# Patient Record
Sex: MALE | Race: WHITE | Employment: OTHER | URBAN - METROPOLITAN AREA
[De-identification: names, ages, dates, MRNs, and addresses within clinical notes are randomized per-mention and may not be internally consistent; named-entity substitution may affect disease eponyms.]

---

## 2018-12-26 ENCOUNTER — HOSPITAL ENCOUNTER (INPATIENT)
Age: 73
LOS: 9 days | Discharge: HOME HEALTH | DRG: 253 | End: 2019-01-04
Attending: EMERGENCY MEDICINE | Admitting: SURGERY
Payer: MEDICARE

## 2018-12-26 DIAGNOSIS — Z95.9 STATUS POST ARTERIAL STENT: Primary | ICD-10-CM

## 2018-12-26 DIAGNOSIS — I73.9 PVD (PERIPHERAL VASCULAR DISEASE) (HCC): ICD-10-CM

## 2018-12-26 PROBLEM — I10 HYPERTENSION: Status: ACTIVE | Noted: 2018-12-26

## 2018-12-26 PROBLEM — G47.00 INSOMNIA: Status: ACTIVE | Noted: 2018-12-26

## 2018-12-26 PROBLEM — E78.00 HIGH CHOLESTEROL: Status: ACTIVE | Noted: 2018-12-26

## 2018-12-26 PROBLEM — J44.9 COPD (CHRONIC OBSTRUCTIVE PULMONARY DISEASE) (HCC): Status: ACTIVE | Noted: 2018-12-26

## 2018-12-26 LAB
ALBUMIN SERPL-MCNC: 3.6 G/DL (ref 3.4–5)
ALBUMIN/GLOB SERPL: 0.9 {RATIO} (ref 0.8–1.7)
ALP SERPL-CCNC: 89 U/L (ref 45–117)
ALT SERPL-CCNC: 23 U/L (ref 16–61)
ANION GAP SERPL CALC-SCNC: 6 MMOL/L (ref 3–18)
APTT PPP: 28.2 SEC (ref 23–36.4)
AST SERPL-CCNC: 31 U/L (ref 15–37)
BASOPHILS # BLD: 0 K/UL (ref 0–0.1)
BASOPHILS NFR BLD: 1 % (ref 0–2)
BILIRUB SERPL-MCNC: 1 MG/DL (ref 0.2–1)
BUN SERPL-MCNC: 18 MG/DL (ref 7–18)
BUN/CREAT SERPL: 23 (ref 12–20)
CALCIUM SERPL-MCNC: 9.2 MG/DL (ref 8.5–10.1)
CHLORIDE SERPL-SCNC: 101 MMOL/L (ref 100–108)
CO2 SERPL-SCNC: 32 MMOL/L (ref 21–32)
CREAT SERPL-MCNC: 0.8 MG/DL (ref 0.6–1.3)
DIFFERENTIAL METHOD BLD: NORMAL
EOSINOPHIL # BLD: 0.2 K/UL (ref 0–0.4)
EOSINOPHIL NFR BLD: 2 % (ref 0–5)
ERYTHROCYTE [DISTWIDTH] IN BLOOD BY AUTOMATED COUNT: 14.5 % (ref 11.6–14.5)
GLOBULIN SER CALC-MCNC: 4.1 G/DL (ref 2–4)
GLUCOSE SERPL-MCNC: 89 MG/DL (ref 74–99)
HCT VFR BLD AUTO: 45.4 % (ref 36–48)
HGB BLD-MCNC: 14.5 G/DL (ref 13–16)
INR PPP: 1 (ref 0.8–1.2)
LYMPHOCYTES # BLD: 1.5 K/UL (ref 0.9–3.6)
LYMPHOCYTES NFR BLD: 21 % (ref 21–52)
MCH RBC QN AUTO: 30.3 PG (ref 24–34)
MCHC RBC AUTO-ENTMCNC: 31.9 G/DL (ref 31–37)
MCV RBC AUTO: 94.8 FL (ref 74–97)
MONOCYTES # BLD: 0.7 K/UL (ref 0.05–1.2)
MONOCYTES NFR BLD: 10 % (ref 3–10)
NEUTS SEG # BLD: 4.6 K/UL (ref 1.8–8)
NEUTS SEG NFR BLD: 66 % (ref 40–73)
PLATELET # BLD AUTO: 279 K/UL (ref 135–420)
PMV BLD AUTO: 11 FL (ref 9.2–11.8)
POTASSIUM SERPL-SCNC: 4.4 MMOL/L (ref 3.5–5.5)
PROT SERPL-MCNC: 7.7 G/DL (ref 6.4–8.2)
PROTHROMBIN TIME: 13.2 SEC (ref 11.5–15.2)
RBC # BLD AUTO: 4.79 M/UL (ref 4.7–5.5)
SODIUM SERPL-SCNC: 139 MMOL/L (ref 136–145)
WBC # BLD AUTO: 7 K/UL (ref 4.6–13.2)

## 2018-12-26 PROCEDURE — 94664 DEMO&/EVAL PT USE INHALER: CPT

## 2018-12-26 PROCEDURE — 74011250636 HC RX REV CODE- 250/636: Performed by: EMERGENCY MEDICINE

## 2018-12-26 PROCEDURE — 85730 THROMBOPLASTIN TIME PARTIAL: CPT

## 2018-12-26 PROCEDURE — 85025 COMPLETE CBC W/AUTO DIFF WBC: CPT

## 2018-12-26 PROCEDURE — 94640 AIRWAY INHALATION TREATMENT: CPT

## 2018-12-26 PROCEDURE — 85610 PROTHROMBIN TIME: CPT

## 2018-12-26 PROCEDURE — 74011000250 HC RX REV CODE- 250: Performed by: HOSPITALIST

## 2018-12-26 PROCEDURE — 80053 COMPREHEN METABOLIC PANEL: CPT

## 2018-12-26 PROCEDURE — 74011250637 HC RX REV CODE- 250/637: Performed by: HOSPITALIST

## 2018-12-26 PROCEDURE — 99283 EMERGENCY DEPT VISIT LOW MDM: CPT

## 2018-12-26 PROCEDURE — 65270000029 HC RM PRIVATE

## 2018-12-26 RX ORDER — ESCITALOPRAM OXALATE 5 MG/1
5 TABLET ORAL DAILY
COMMUNITY
End: 2019-01-04

## 2018-12-26 RX ORDER — DIAZEPAM 5 MG/1
5 TABLET ORAL
COMMUNITY

## 2018-12-26 RX ORDER — HEPARIN SODIUM 10000 [USP'U]/100ML
18-36 INJECTION, SOLUTION INTRAVENOUS
Status: DISCONTINUED | OUTPATIENT
Start: 2018-12-26 | End: 2018-12-26

## 2018-12-26 RX ORDER — ARFORMOTEROL TARTRATE 15 UG/2ML
15 SOLUTION RESPIRATORY (INHALATION)
Status: DISCONTINUED | OUTPATIENT
Start: 2018-12-26 | End: 2019-01-04 | Stop reason: HOSPADM

## 2018-12-26 RX ORDER — DIAZEPAM 5 MG/1
5 TABLET ORAL
Status: DISCONTINUED | OUTPATIENT
Start: 2018-12-26 | End: 2019-01-04 | Stop reason: HOSPADM

## 2018-12-26 RX ORDER — BUDESONIDE AND FORMOTEROL FUMARATE DIHYDRATE 160; 4.5 UG/1; UG/1
2 AEROSOL RESPIRATORY (INHALATION) 2 TIMES DAILY
COMMUNITY

## 2018-12-26 RX ORDER — LOSARTAN POTASSIUM 50 MG/1
100 TABLET ORAL DAILY
Status: DISCONTINUED | OUTPATIENT
Start: 2018-12-26 | End: 2019-01-01

## 2018-12-26 RX ORDER — ROSUVASTATIN CALCIUM 10 MG/1
40 TABLET, COATED ORAL
Status: DISCONTINUED | OUTPATIENT
Start: 2018-12-26 | End: 2019-01-04 | Stop reason: HOSPADM

## 2018-12-26 RX ORDER — ESCITALOPRAM OXALATE 10 MG/1
5 TABLET ORAL DAILY
Status: DISCONTINUED | OUTPATIENT
Start: 2018-12-27 | End: 2019-01-01

## 2018-12-26 RX ORDER — BUDESONIDE AND FORMOTEROL FUMARATE DIHYDRATE 160; 4.5 UG/1; UG/1
2 AEROSOL RESPIRATORY (INHALATION) 2 TIMES DAILY
Status: DISCONTINUED | OUTPATIENT
Start: 2018-12-26 | End: 2018-12-26

## 2018-12-26 RX ORDER — BUDESONIDE 0.5 MG/2ML
500 INHALANT ORAL
Status: DISCONTINUED | OUTPATIENT
Start: 2018-12-26 | End: 2019-01-04 | Stop reason: HOSPADM

## 2018-12-26 RX ORDER — HEPARIN SODIUM 1000 [USP'U]/ML
80 INJECTION, SOLUTION INTRAVENOUS; SUBCUTANEOUS ONCE
Status: COMPLETED | OUTPATIENT
Start: 2018-12-26 | End: 2018-12-26

## 2018-12-26 RX ORDER — HYDROCHLOROTHIAZIDE 25 MG/1
25 TABLET ORAL DAILY
Status: DISCONTINUED | OUTPATIENT
Start: 2018-12-26 | End: 2018-12-30

## 2018-12-26 RX ORDER — TEMAZEPAM 7.5 MG/1
30 CAPSULE ORAL
Status: DISCONTINUED | OUTPATIENT
Start: 2018-12-26 | End: 2018-12-30

## 2018-12-26 RX ADMIN — HYDROCHLOROTHIAZIDE 25 MG: 25 TABLET ORAL at 21:42

## 2018-12-26 RX ADMIN — LOSARTAN POTASSIUM 100 MG: 50 TABLET ORAL at 21:43

## 2018-12-26 RX ADMIN — ARFORMOTEROL TARTRATE 15 MCG: 15 SOLUTION RESPIRATORY (INHALATION) at 20:59

## 2018-12-26 RX ADMIN — TEMAZEPAM 30 MG: 15 CAPSULE ORAL at 22:47

## 2018-12-26 RX ADMIN — ROSUVASTATIN CALCIUM 40 MG: 20 TABLET, FILM COATED ORAL at 22:46

## 2018-12-26 RX ADMIN — HEPARIN SODIUM 4900 UNITS: 1000 INJECTION INTRAVENOUS; SUBCUTANEOUS at 15:54

## 2018-12-26 RX ADMIN — BUDESONIDE 500 MCG: 0.5 INHALANT RESPIRATORY (INHALATION) at 20:59

## 2018-12-26 NOTE — Clinical Note
Sheath #2: Dressed using 4 X 4 and transparent dressing. Site: clean, dry, & intact, no bleeding and no hematoma.

## 2018-12-26 NOTE — ED NOTES
TRANSFER - ED to INPATIENT REPORT:    SBAR report made available to receiving floor on this patient being transferred to [facility] ([id number])  for routine progression of care       Admitting diagnosis Status post arterial stent    Information from the following report(s) SBAR, Kardex, ED Summary, Procedure Summary, Intake/Output, MAR and Recent Results was made available to receiving floor. Lines:       Medication list confirmed with patient    Opportunity for questions and clarification was provided.       Patient is oriented to time, place, person and situation Last [facility] n/a  Patient is  continent and ambulatory without assist     Valuables transported with patient     Patient transported with:   Tech      Vitals w/ MEWS Score (last day)     Date/Time MEWS Score Pulse Resp Temp BP Level of Consciousness SpO2    12/26/18 1600    71  19    182/72    97 %    12/26/18 1545    71  16        97 %    12/26/18 1430    65  18    177/71    97 %    12/26/18 1415    77  20        88 %  (Abnormal)     12/26/18 1400    70  18    142/121  (Abnormal)     95 %    12/26/18 1345    71  17    170/77    95 %    12/26/18 1340  1  71  17  98.1 °F (36.7 °C)  158/79  Alert  96 %

## 2018-12-26 NOTE — CONSULTS
Internal Medicine Consult          Consult Requested By: Dr. Lisa Echeverria, vascular surgery    Subjective     HPI: Joseluis Naik is a 74 y.o. male with a PMHx of HTN, HLD, PAD who we were consulted for medical management during admission for PAD. The patient had right popliteal artery occlusion and SFA stenosis with atherectomy, right pop a. Stent placement and right SFA angioplasty on 12/13. The patient admits to being discharged home. He then states that his RLE became darker in color and developed black toes and loss of sensation plantar aspect. It is unclear if this happened before or after procedure because the patient is a poor historian. Regardless, those particular symptoms improved prior to presentation today. The patient states he was at elective bladder procedure today to evaluate previously treated cyst when he told physician his leg needed to be checked out before the procedure. He was then sent to [facility]  for closer examination due to concern for arterial occlusion. He states he had been on xarelto but had stopped taking for the bladder procedure about a week ago. In the ED, his doppler pulses were heard on RLE. He complains of pain, discoloration of his RLE, but sensation is essentially intact.      PMHx:  Past Medical History:   Diagnosis Date    High cholesterol     Hypertension        PSurgHx:  Past Surgical History:   Procedure Laterality Date    HX APPENDECTOMY      HX MOHS PROCEDURES Left     HX TONSIL AND ADENOIDECTOMY         SocialHx:  Social History     Socioeconomic History    Marital status:      Spouse name: Not on file    Number of children: Not on file    Years of education: Not on file    Highest education level: Not on file   Social Needs    Financial resource strain: Not on file    Food insecurity - worry: Not on file    Food insecurity - inability: Not on file   [place] needs - medical: Not on file   [place] needs - non-medical: Not on file Occupational History    Not on file   Tobacco Use    Smoking status: Current Every Day Smoker     Packs/day: 1.00   Substance and Sexual Activity    Alcohol use: Yes     Comment: \" beer 3/day\"    Drug use: Not on file    Sexual activity: Not on file   Other Topics Concern    Not on file   Social History Narrative    Not on file       FamilyHx:  No family history on file. Prior to Admission Medications   Prescriptions Last Dose Informant Patient Reported? Taking?   aspirin 81 mg chewable tablet   Yes No   Sig: Take 81 mg by mouth daily. losartan-hydrochlorothiazide (HYZAAR) 100-25 mg per tablet   Yes No   Sig: Take 1 Tab by mouth daily. rosuvastatin (CRESTOR) 40 mg tablet   Yes No   Sig: Take 40 mg by mouth nightly. temazepam (RESTORIL) 30 mg capsule   Yes No   Sig: Take 30 mg by mouth nightly as needed for Sleep.       Facility-Administered Medications: None       Review of Systems:  CONST: Fever, weight loss, fatigue or chills  HEENT: Recent changes in vision, vertigo, epistaxis, dysphagia and hoarseness  CV: Chest pain, palpitations, edema and varicosities  RESP: Cough, shortness of breath, wheezing, hemoptysis, snoring and reactive airway disease  GI: Nausea, vomiting, abdominal pain, change in bowel habits, hematochezia, melena, and GERD   : Hematuria, dysuria, frequency, urgency, nocturia and stress urinary incontinence   MS: Weakness, joint pain and arthritis, pain RLE  ENDO: Polyuria, polydipsia, polyphagia, poor wound healing, heat intolerance, cold intolerance  LYMPH/HEME: Anemia, bruising and history of blood transfusions  INTEG: Dermatitis, abnormal moles, discoloration RLE  NEURO: Dizziness, headache, fainting, seizures and stroke  PSYCH: Anxiety and depression        Objective      Visit Vitals  /79 (BP 1 Location: Right arm, BP Patient Position: At rest;Sitting)   Pulse 71   Temp 98.1 °F (36.7 °C)   Resp 17   Ht 5' 8\" (1.727 m)   Wt 61.2 kg (135 lb)   SpO2 96%   BMI 20.53 kg/m² Physical Exam:  General Appearance: NAD, conversant  HENT: normocephalic/atraumatic, moist mucus membranes  Lungs: CTA with normal respiratory effort  CV: RRR, no m/r/g  Abdomen: soft, non-tender, normal bowel sounds  Extremities: RLE is purplish color from thigh distally, sensation intact  Neuro: moves all extremities, no focal deficits  Psych: appropriate affect, alert and oriented to person, place and time    Laboratory Studies:  BMP: No results found for: NA, K, CL, CO2, AGAP, GLU, BUN, CREA, GFRAA, GFRNA  CBC:   Lab Results   Component Value Date/Time    WBC 7.0 12/26/2018 02:05 PM    HGB 14.5 12/26/2018 02:05 PM    HCT 45.4 12/26/2018 02:05 PM     12/26/2018 02:05 PM       Imaging Reviewed:  No results found. Assessment/Plan     Active Hospital Problems    Diagnosis Date Noted    Status post arterial stent 12/26/2018    High cholesterol 12/26/2018    Hypertension 12/26/2018    Peripheral arterial disease ([facility] [address] 12/26/2018    COPD (chronic obstructive pulmonary disease) ([facility] [address] 12/26/2018    Insomnia 12/26/2018     - Patient without acute limb ischemia given ext warm to touch, ability to move, no sensory issues  - F/u vascular surgery eval  - Diet and mobilization per primary team  - Pain control PRN  - PT/OT  - Cont heparin gtt per vasc surg  - Observe off antibx  - Cont acceptable home medications for chronic conditions   - DVT protocol    I reviewed all available labs and imaging that were available prior to my encounter. We appreciate the consultation for medical management and appreciate being able to be involved with their care during hospitalization.       Andrez Coronel, DO  Internal Medicine, Hospitalist  Pager: [phone]

## 2018-12-26 NOTE — H&P
75 yo male with history of severe atherosclerosis and claudication who was seen at [facility] today for bladder biopsy and noted to have discoloration of his right leg. Pain has subsided according to patient report. He is s/p right popliteal artery atherectomy and angioplasty on 12/13. Likely popliteal dissection vs. Thrombosis. Will plan for angiogram 12/27. Heparin gtt for now. NPO after midnight.

## 2018-12-26 NOTE — Clinical Note
TRANSFER - OUT REPORT:  
 
Verbal report given to: Aletha Granado RN. Report consisted of patient's Situation, Background, Assessment and  
Recommendations(SBAR). Opportunity for questions and clarification was provided. Patient transported with a Cardiac Cath Tech / Patient Care Tech. Patient transported to: PACU.

## 2018-12-26 NOTE — Clinical Note
Sheath #2: Closed using manual compression and ExoSeal. Site secured by Tegaderm. Pressure held for: 16 minutes.

## 2018-12-26 NOTE — Clinical Note
Sheath #1: Closed using manual compression. Site secured by Tegaderm. Pressure held for: 10 minutes.

## 2018-12-26 NOTE — ED TRIAGE NOTES
Pt arrived by EMS from [facility] with c/po of left foot cool, discolored and numbness in extremity

## 2018-12-26 NOTE — ED PROVIDER NOTES
EMERGENCY DEPARTMENT HISTORY AND PHYSICAL EXAM    1:19 PM      Date: 12/26/2018  Patient Name: Luis Pitts    History of Presenting Illness     Chief Complaint   Patient presents with    Numbness         History Provided By: Patient       1:19 PM Luis Pitts is a 74 y.o. male with h/o HTN and HLD who presents to ED complaining of moderate, acute right artery occlusion  onset one week with associated discoloration in the RLE and decreased sensation in the right big toe. The patient was brought in by EMS. He was not given Heparin or medication PTA. The patient had a stent placed in the RLE on 12/13/18. Afterwards, the patient's right leg turned black, and he lost feeling in the right big toe. The patient was on Xarelto for one week after the surgery, but he stopped taking it because the patient was due for bladder surgery today. The patient took Motrin daily. The surgeon noticed the discoloration in the leg and sent him to the ED. No other concerns or symptoms at this time. PCP: Ayesha Patel MD        Current Facility-Administered Medications   Medication Dose Route Frequency Provider Last Rate Last Dose    heparin 25,000 units in D5W 250 ml infusion  18-36 Units/kg/hr IntraVENous TITRATE Michoacano Hernandez MD           Past History     Past Medical History:  Past Medical History:   Diagnosis Date    High cholesterol     Hypertension        Past Surgical History:  Past Surgical History:   Procedure Laterality Date    HX APPENDECTOMY      HX MOHS PROCEDURES Left     HX TONSIL AND ADENOIDECTOMY         Family History:  No family history on file. Social History:  Social History     Tobacco Use    Smoking status: Current Every Day Smoker     Packs/day: 1.00   Substance Use Topics    Alcohol use: Yes     Comment: \" beer 3/day\"    Drug use: Not on file       Allergies:   Allergies   Allergen Reactions    Augmentin [Amoxicillin-Pot Clavulanate] Diarrhea         Review of Systems       Review of Systems   Cardiovascular: Positive for leg swelling (right). Skin: Positive for color change (right leg). All other systems reviewed and are negative. Physical Exam     Visit Vitals  /72   Pulse 71   Temp 98.1 °F (36.7 °C)   Resp 19   Ht 5' 8\" (1.727 m)   Wt 61.2 kg (135 lb)   SpO2 97%   BMI 20.53 kg/m²         Physical Exam   Constitutional: He is oriented to person, place, and time. He appears well-developed and well-nourished. HENT:   Head: Normocephalic and atraumatic. Eyes: EOM are normal. Pupils are equal, round, and reactive to light. Right eye exhibits no discharge. Left eye exhibits no discharge. Neck: Normal range of motion. Neck supple. No JVD present. No tracheal deviation present. Cardiovascular: Normal rate and regular rhythm. Murmur heard. Crescendo systolic murmur is present with a grade of 3/6. Pulses:       Posterior tibial pulses are 0 on the right side, and 1+ on the left side. Pulmonary/Chest: Effort normal and breath sounds normal. No respiratory distress. He has no wheezes. He has no rales. Abdominal: Soft. Bowel sounds are normal. He exhibits no distension. There is no tenderness. There is no rebound. Musculoskeletal: Normal range of motion. He exhibits no tenderness or deformity. Neurological: He is alert and oriented to person, place, and time. No cranial nerve deficit. Skin: Skin is warm and dry. No erythema. Discoloration below midcalf in RLE   Psychiatric: He has a normal mood and affect.  His behavior is normal.         Diagnostic Study Results     Labs -  Recent Results (from the past 12 hour(s))   CBC WITH AUTOMATED DIFF    Collection Time: 12/26/18  2:05 PM   Result Value Ref Range    WBC 7.0 4.6 - 13.2 K/uL    RBC 4.79 4.70 - 5.50 M/uL    HGB 14.5 13.0 - 16.0 g/dL    HCT 45.4 36.0 - 48.0 %    MCV 94.8 74.0 - 97.0 FL    MCH 30.3 24.0 - 34.0 PG    MCHC 31.9 31.0 - 37.0 g/dL    RDW 14.5 11.6 - 14.5 %    PLATELET 579 271 - 498 K/uL    MPV 11.0 9.2 - 11.8 FL    NEUTROPHILS 66 40 - 73 %    LYMPHOCYTES 21 21 - 52 %    MONOCYTES 10 3 - 10 %    EOSINOPHILS 2 0 - 5 %    BASOPHILS 1 0 - 2 %    ABS. NEUTROPHILS 4.6 1.8 - 8.0 K/UL    ABS. LYMPHOCYTES 1.5 0.9 - 3.6 K/UL    ABS. MONOCYTES 0.7 0.05 - 1.2 K/UL    ABS. EOSINOPHILS 0.2 0.0 - 0.4 K/UL    ABS. BASOPHILS 0.0 0.0 - 0.1 K/UL    DF AUTOMATED     PROTHROMBIN TIME + INR    Collection Time: 12/26/18  2:05 PM   Result Value Ref Range    Prothrombin time 13.2 11.5 - 15.2 sec    INR 1.0 0.8 - 1.2     METABOLIC PANEL, COMPREHENSIVE    Collection Time: 12/26/18  2:05 PM   Result Value Ref Range    Sodium 139 136 - 145 mmol/L    Potassium 4.4 3.5 - 5.5 mmol/L    Chloride 101 100 - 108 mmol/L    CO2 32 21 - 32 mmol/L    Anion gap 6 3.0 - 18 mmol/L    Glucose 89 74 - 99 mg/dL    BUN 18 7.0 - 18 MG/DL    Creatinine 0.80 0.6 - 1.3 MG/DL    BUN/Creatinine ratio 23 (H) 12 - 20      GFR est AA >60 >60 ml/min/1.73m2    GFR est non-AA >60 >60 ml/min/1.73m2    Calcium 9.2 8.5 - 10.1 MG/DL    Bilirubin, total 1.0 0.2 - 1.0 MG/DL    ALT (SGPT) 23 16 - 61 U/L    AST (SGOT) 31 15 - 37 U/L    Alk. phosphatase 89 45 - 117 U/L    Protein, total 7.7 6.4 - 8.2 g/dL    Albumin 3.6 3.4 - 5.0 g/dL    Globulin 4.1 (H) 2.0 - 4.0 g/dL    A-G Ratio 0.9 0.8 - 1.7     PTT    Collection Time: 12/26/18  2:05 PM   Result Value Ref Range    aPTT 28.2 23.0 - 36.4 SEC       Radiologic Studies -   No orders to display         Medical Decision Making   I am the first provider for this patient. I reviewed the vital signs, available nursing notes, past medical history, past surgical history, family history and social history. Vital Signs-Reviewed the patient's vital signs. Records Reviewed: Nursing Notes        ED Course: Progress Notes, Reevaluation, and Consults:  1:33 PM  Dopplerable DP in RLE.     Provider Notes (Medical Decision Making):     MDM  Number of Diagnoses or Management Options  Status post arterial stent:   Diagnosis management comments: Diff dx: dvt, arterial occlusion, phlegmasia    Pt presenting with darkened RLE after SFA stent and balloon angioplasty dec 14, sent from [place] for vascular admission and angiogram in AM. Vascular suspects popliteal dissection, there is a dopplerable pulse in RLE DP. Will start heparin, admit, pt has no pain at this time, no other complaints. Medicine will consult    Urban Hansen MD      For Hospitalized Patients:      2. Hospitalization Decision Time:  The decision to hospitalize the patient was made by Dr. Malgorzata Zelaya at [phone] on 12/26/2018          Diagnosis     Clinical Impression:   1. Status post arterial stent        Disposition: Admit    Follow-up Information    None             Medication List      ASK your doctor about these medications    CRESTOR 40 mg tablet  Generic drug:  rosuvastatin     LEXAPRO 5 mg tablet  Generic drug:  escitalopram oxalate     losartan-hydroCHLOROthiazide 100-25 mg per tablet  Commonly known as:  HYZAAR     RESTORIL 30 mg capsule  Generic drug:  temazepam     SYMBICORT 160-4.5 mcg/actuation Hfaa  Generic drug:  budesonide-formoterol     VALIUM 5 mg tablet  Generic drug:  diazePAM     XARELTO 20 mg Tab tablet  Generic drug:  rivaroxaban          _______________________________    Attestations:  Scribe Attestation     Asaf Neri acting as a scribe for and in the presence of Renan Terrazas MD      December 26, 2018 at 1:19 PM       Provider Attestation:      I personally performed the services described in the documentation, reviewed the documentation, as recorded by the scribe in my presence, and it accurately and completely records my words and actions.  December 26, 2018 at 1:19 PM - Renan Terrazas MD    _______________________________

## 2018-12-27 LAB
APTT PPP: 109.1 SEC (ref 23–36.4)
BASOPHILS # BLD: 0 K/UL (ref 0–0.1)
BASOPHILS # BLD: 0.1 K/UL (ref 0–0.1)
BASOPHILS NFR BLD: 1 % (ref 0–2)
BASOPHILS NFR BLD: 1 % (ref 0–2)
DIFFERENTIAL METHOD BLD: ABNORMAL
DIFFERENTIAL METHOD BLD: NORMAL
EOSINOPHIL # BLD: 0.2 K/UL (ref 0–0.4)
EOSINOPHIL # BLD: 0.3 K/UL (ref 0–0.4)
EOSINOPHIL NFR BLD: 3 % (ref 0–5)
EOSINOPHIL NFR BLD: 4 % (ref 0–5)
ERYTHROCYTE [DISTWIDTH] IN BLOOD BY AUTOMATED COUNT: 14.3 % (ref 11.6–14.5)
ERYTHROCYTE [DISTWIDTH] IN BLOOD BY AUTOMATED COUNT: 14.4 % (ref 11.6–14.5)
HCT VFR BLD AUTO: 40.6 % (ref 36–48)
HCT VFR BLD AUTO: 45.4 % (ref 36–48)
HGB BLD-MCNC: 13 G/DL (ref 13–16)
HGB BLD-MCNC: 14.5 G/DL (ref 13–16)
LYMPHOCYTES # BLD: 2.1 K/UL (ref 0.9–3.6)
LYMPHOCYTES # BLD: 2.1 K/UL (ref 0.9–3.6)
LYMPHOCYTES NFR BLD: 27 % (ref 21–52)
LYMPHOCYTES NFR BLD: 28 % (ref 21–52)
MCH RBC QN AUTO: 30.2 PG (ref 24–34)
MCH RBC QN AUTO: 30.7 PG (ref 24–34)
MCHC RBC AUTO-ENTMCNC: 31.9 G/DL (ref 31–37)
MCHC RBC AUTO-ENTMCNC: 32 G/DL (ref 31–37)
MCV RBC AUTO: 94.4 FL (ref 74–97)
MCV RBC AUTO: 96.2 FL (ref 74–97)
MONOCYTES # BLD: 0.6 K/UL (ref 0.05–1.2)
MONOCYTES # BLD: 0.6 K/UL (ref 0.05–1.2)
MONOCYTES NFR BLD: 8 % (ref 3–10)
MONOCYTES NFR BLD: 9 % (ref 3–10)
NEUTS SEG # BLD: 4.4 K/UL (ref 1.8–8)
NEUTS SEG # BLD: 4.8 K/UL (ref 1.8–8)
NEUTS SEG NFR BLD: 58 % (ref 40–73)
NEUTS SEG NFR BLD: 61 % (ref 40–73)
PLATELET # BLD AUTO: 245 K/UL (ref 135–420)
PLATELET # BLD AUTO: 258 K/UL (ref 135–420)
PMV BLD AUTO: 10.7 FL (ref 9.2–11.8)
PMV BLD AUTO: 11.3 FL (ref 9.2–11.8)
RBC # BLD AUTO: 4.3 M/UL (ref 4.7–5.5)
RBC # BLD AUTO: 4.72 M/UL (ref 4.7–5.5)
WBC # BLD AUTO: 7.5 K/UL (ref 4.6–13.2)
WBC # BLD AUTO: 7.7 K/UL (ref 4.6–13.2)

## 2018-12-27 PROCEDURE — 65270000029 HC RM PRIVATE

## 2018-12-27 PROCEDURE — 74011000250 HC RX REV CODE- 250: Performed by: HOSPITALIST

## 2018-12-27 PROCEDURE — C1760 CLOSURE DEV, VASC: HCPCS | Performed by: SURGERY

## 2018-12-27 PROCEDURE — C1769 GUIDE WIRE: HCPCS | Performed by: SURGERY

## 2018-12-27 PROCEDURE — 99153 MOD SED SAME PHYS/QHP EA: CPT | Performed by: SURGERY

## 2018-12-27 PROCEDURE — C1894 INTRO/SHEATH, NON-LASER: HCPCS | Performed by: SURGERY

## 2018-12-27 PROCEDURE — 75710 ARTERY X-RAYS ARM/LEG: CPT | Performed by: SURGERY

## 2018-12-27 PROCEDURE — 94640 AIRWAY INHALATION TREATMENT: CPT

## 2018-12-27 PROCEDURE — 77030032660 HC CATH ANGI PRPH SUPP CXI COOK -C: Performed by: SURGERY

## 2018-12-27 PROCEDURE — B40J1ZZ PLAIN RADIOGRAPHY OF OTHER LOWER ARTERIES USING LOW OSMOLAR CONTRAST: ICD-10-PCS | Performed by: SURGERY

## 2018-12-27 PROCEDURE — 99152 MOD SED SAME PHYS/QHP 5/>YRS: CPT | Performed by: SURGERY

## 2018-12-27 PROCEDURE — 77030012597: Performed by: SURGERY

## 2018-12-27 PROCEDURE — 74011250636 HC RX REV CODE- 250/636: Performed by: SURGERY

## 2018-12-27 PROCEDURE — 74011250637 HC RX REV CODE- 250/637: Performed by: HOSPITALIST

## 2018-12-27 PROCEDURE — 85730 THROMBOPLASTIN TIME PARTIAL: CPT

## 2018-12-27 PROCEDURE — 74011250636 HC RX REV CODE- 250/636

## 2018-12-27 PROCEDURE — 77030008584 HC TOOL GDWRE DEV TERU -A: Performed by: SURGERY

## 2018-12-27 PROCEDURE — C1887 CATHETER, GUIDING: HCPCS | Performed by: SURGERY

## 2018-12-27 PROCEDURE — 36415 COLL VENOUS BLD VENIPUNCTURE: CPT

## 2018-12-27 PROCEDURE — 36246 INS CATH ABD/L-EXT ART 2ND: CPT | Performed by: SURGERY

## 2018-12-27 PROCEDURE — 74011636320 HC RX REV CODE- 636/320: Performed by: SURGERY

## 2018-12-27 PROCEDURE — 85025 COMPLETE CBC W/AUTO DIFF WBC: CPT

## 2018-12-27 RX ORDER — FENTANYL CITRATE 50 UG/ML
INJECTION, SOLUTION INTRAMUSCULAR; INTRAVENOUS AS NEEDED
Status: DISCONTINUED | OUTPATIENT
Start: 2018-12-27 | End: 2018-12-27 | Stop reason: HOSPADM

## 2018-12-27 RX ORDER — CEFAZOLIN SODIUM 1 G/3ML
INJECTION, POWDER, FOR SOLUTION INTRAMUSCULAR; INTRAVENOUS AS NEEDED
Status: DISCONTINUED | OUTPATIENT
Start: 2018-12-27 | End: 2018-12-27 | Stop reason: HOSPADM

## 2018-12-27 RX ORDER — HEPARIN SODIUM 10000 [USP'U]/100ML
12-25 INJECTION, SOLUTION INTRAVENOUS
Status: DISCONTINUED | OUTPATIENT
Start: 2018-12-27 | End: 2018-12-28

## 2018-12-27 RX ORDER — HEPARIN SODIUM 200 [USP'U]/100ML
INJECTION, SOLUTION INTRAVENOUS AS NEEDED
Status: DISCONTINUED | OUTPATIENT
Start: 2018-12-27 | End: 2018-12-27 | Stop reason: HOSPADM

## 2018-12-27 RX ORDER — MIDAZOLAM HYDROCHLORIDE 1 MG/ML
INJECTION, SOLUTION INTRAMUSCULAR; INTRAVENOUS AS NEEDED
Status: DISCONTINUED | OUTPATIENT
Start: 2018-12-27 | End: 2018-12-27 | Stop reason: HOSPADM

## 2018-12-27 RX ORDER — HEPARIN SODIUM 1000 [USP'U]/ML
INJECTION, SOLUTION INTRAVENOUS; SUBCUTANEOUS AS NEEDED
Status: DISCONTINUED | OUTPATIENT
Start: 2018-12-27 | End: 2018-12-27 | Stop reason: HOSPADM

## 2018-12-27 RX ORDER — LIDOCAINE HYDROCHLORIDE 10 MG/ML
INJECTION INFILTRATION; PERINEURAL AS NEEDED
Status: DISCONTINUED | OUTPATIENT
Start: 2018-12-27 | End: 2018-12-27 | Stop reason: HOSPADM

## 2018-12-27 RX ORDER — HYDRALAZINE HYDROCHLORIDE 20 MG/ML
INJECTION INTRAMUSCULAR; INTRAVENOUS AS NEEDED
Status: DISCONTINUED | OUTPATIENT
Start: 2018-12-27 | End: 2018-12-27 | Stop reason: HOSPADM

## 2018-12-27 RX ORDER — IODIXANOL 320 MG/ML
INJECTION, SOLUTION INTRAVASCULAR AS NEEDED
Status: DISCONTINUED | OUTPATIENT
Start: 2018-12-27 | End: 2018-12-27 | Stop reason: HOSPADM

## 2018-12-27 RX ORDER — ACETAMINOPHEN 325 MG/1
650 TABLET ORAL
Status: DISCONTINUED | OUTPATIENT
Start: 2018-12-27 | End: 2018-12-28

## 2018-12-27 RX ADMIN — HEPARIN SODIUM AND DEXTROSE 12 UNITS/KG/HR: 10000; 5 INJECTION INTRAVENOUS at 18:41

## 2018-12-27 RX ADMIN — BUDESONIDE 500 MCG: 0.5 INHALANT RESPIRATORY (INHALATION) at 21:07

## 2018-12-27 RX ADMIN — TEMAZEPAM 30 MG: 15 CAPSULE ORAL at 23:41

## 2018-12-27 RX ADMIN — ARFORMOTEROL TARTRATE 15 MCG: 15 SOLUTION RESPIRATORY (INHALATION) at 21:07

## 2018-12-27 NOTE — PROGRESS NOTES
[facility]  Hospitalist Division           Inpatient Daily Progress Note        Patient: Carmenza De Souza MRN: [id number]  CSN: [id number]    YOB: 1945  Age: 74 y.o. Sex: male    DOA: 12/26/2018 LOS:  LOS: 1 day                      Interval History:    Per Dr. Shivam Brice HPI: Paul Pratt is a 74 y.o. male with a PMHx of HTN, HLD, PAD who we were consulted for medical management during admission for PAD. The patient had right popliteal artery occlusion and SFA stenosis with atherectomy, right pop a. Stent placement and right SFA angioplasty on 12/13. The patient admits to being discharged home. He then states that his RLE became darker in color and developed black toes and loss of sensation plantar aspect. It is unclear if this happened before or after procedure because the patient is a poor historian. Regardless, those particular symptoms improved prior to presentation today. The patient states he was at elective bladder procedure today to evaluate previously treated cyst when he told physician his leg needed to be checked out before the procedure. He was then sent to [place] [id number] for closer examination due to concern for arterial occlusion. He states he had been on xarelto but had stopped taking for the bladder procedure about a week ago. In the ED, his doppler pulses were heard on RLE. He complains of pain, discoloration of his RLE, but sensation is essentially intact. \" Vascular consulted. Angiogram tomorrow 12/27; heparin gtt (in notes, but not on currently), NPO after MN.     12/27/18: angiogram per vascular today. Defer further recommendations per them. Labs reviewed. HDS.      Subjective:      Pt ready to go home  No pain     Objective:      Visit Vitals  /63 (BP 1 Location: Right arm, BP Patient Position: At rest)   Pulse 89   Temp 98.5 °F (36.9 °C)   Resp 19   Ht 5' 8\" (1.727 m)   Wt 61.2 kg (135 lb)   SpO2 94%   BMI 20.53 kg/m²           Physical Exam:  General: alert, cooperative, no distress, appears stated age  Lungs: clear to auscultation bilaterally  Heart: bradycardiac int, regular rhythm, S1, S2 normal  Abdomen: soft, non tender, non distended. Normoactive bowel sounds. Skin: noted discoloration to RLE; + doppler pulses RLE   Neurologic: Grossly normal; frustrated         Intake and Output:  Current Shift:  No intake/output data recorded. Last three shifts:  No intake/output data recorded. Recent Results (from the past 24 hour(s))   CBC WITH AUTOMATED DIFF    Collection Time: 12/26/18  2:05 PM   Result Value Ref Range    WBC 7.0 4.6 - 13.2 K/uL    RBC 4.79 4.70 - 5.50 M/uL    HGB 14.5 13.0 - 16.0 g/dL    HCT 45.4 36.0 - 48.0 %    MCV 94.8 74.0 - 97.0 FL    MCH 30.3 24.0 - 34.0 PG    MCHC 31.9 31.0 - 37.0 g/dL    RDW 14.5 11.6 - 14.5 %    PLATELET 872 644 - 159 K/uL    MPV 11.0 9.2 - 11.8 FL    NEUTROPHILS 66 40 - 73 %    LYMPHOCYTES 21 21 - 52 %    MONOCYTES 10 3 - 10 %    EOSINOPHILS 2 0 - 5 %    BASOPHILS 1 0 - 2 %    ABS. NEUTROPHILS 4.6 1.8 - 8.0 K/UL    ABS. LYMPHOCYTES 1.5 0.9 - 3.6 K/UL    ABS. MONOCYTES 0.7 0.05 - 1.2 K/UL    ABS. EOSINOPHILS 0.2 0.0 - 0.4 K/UL    ABS.  BASOPHILS 0.0 0.0 - 0.1 K/UL    DF AUTOMATED     PROTHROMBIN TIME + INR    Collection Time: 12/26/18  2:05 PM   Result Value Ref Range    Prothrombin time 13.2 11.5 - 15.2 sec    INR 1.0 0.8 - 1.2     METABOLIC PANEL, COMPREHENSIVE    Collection Time: 12/26/18  2:05 PM   Result Value Ref Range    Sodium 139 136 - 145 mmol/L    Potassium 4.4 3.5 - 5.5 mmol/L    Chloride 101 100 - 108 mmol/L    CO2 32 21 - 32 mmol/L    Anion gap 6 3.0 - 18 mmol/L    Glucose 89 74 - 99 mg/dL    BUN 18 7.0 - 18 MG/DL    Creatinine 0.80 0.6 - 1.3 MG/DL    BUN/Creatinine ratio 23 (H) 12 - 20      GFR est AA >60 >60 ml/min/1.73m2    GFR est non-AA >60 >60 ml/min/1.73m2    Calcium 9.2 8.5 - 10.1 MG/DL    Bilirubin, total 1.0 0.2 - 1.0 MG/DL    ALT (SGPT) 23 16 - 61 U/L    AST (SGOT) 31 15 - 37 U/L Alk. phosphatase 89 45 - 117 U/L    Protein, total 7.7 6.4 - 8.2 g/dL    Albumin 3.6 3.4 - 5.0 g/dL    Globulin 4.1 (H) 2.0 - 4.0 g/dL    A-G Ratio 0.9 0.8 - 1.7     PTT    Collection Time: 12/26/18  2:05 PM   Result Value Ref Range    aPTT 28.2 23.0 - 36.4 SEC   CBC WITH AUTOMATED DIFF    Collection Time: 12/27/18  4:45 AM   Result Value Ref Range    WBC 7.5 4.6 - 13.2 K/uL    RBC 4.30 (L) 4.70 - 5.50 M/uL    HGB 13.0 13.0 - 16.0 g/dL    HCT 40.6 36.0 - 48.0 %    MCV 94.4 74.0 - 97.0 FL    MCH 30.2 24.0 - 34.0 PG    MCHC 32.0 31.0 - 37.0 g/dL    RDW 14.4 11.6 - 14.5 %    PLATELET 226 279 - 225 K/uL    MPV 11.3 9.2 - 11.8 FL    NEUTROPHILS 58 40 - 73 %    LYMPHOCYTES 28 21 - 52 %    MONOCYTES 9 3 - 10 %    EOSINOPHILS 4 0 - 5 %    BASOPHILS 1 0 - 2 %    ABS. NEUTROPHILS 4.4 1.8 - 8.0 K/UL    ABS. LYMPHOCYTES 2.1 0.9 - 3.6 K/UL    ABS. MONOCYTES 0.6 0.05 - 1.2 K/UL    ABS. EOSINOPHILS 0.3 0.0 - 0.4 K/UL    ABS.  BASOPHILS 0.1 0.0 - 0.1 K/UL    DF AUTOMATED             Lab Results   Component Value Date/Time    Glucose 89 12/26/2018 02:05 PM    Glucose 90 05/19/2015 12:49 PM        Assessment/Plan:     Patient Active Problem List   Diagnosis Code    Status post arterial stent Z95.9    High cholesterol E78.00    Hypertension I10    Peripheral arterial disease (HCC) I73.9    COPD (chronic obstructive pulmonary disease) (HCC) J44.9    Insomnia G47.00       A/P:    PAD s/p R popliteal artery atherectomy and angioplasty (12/13)  -per vascular popliteal dissection vs thrombosis   -angiogram 12/27   -heparin gtt (in notes, but not currently on)   -NPO after MN   -defer further recommendations per vascular     HTN  -monitor BP  -home medications    High cholesterol  -statin    COPD  -monitor sats   -on room air currently   -breathing tx     Insomnia  -valium prn     DVT px  -per primary       Katja Barnes, NP-C  [address] [place] [address]  Hospitalist Division  SGPVO:[phone]  Office: [phone]

## 2018-12-27 NOTE — PROGRESS NOTES
conducted an initial consultation and Spiritual Assessment for Kartik Zarate, who is a 74 y. o.,male. Patients Primary Language is: [place]. According to the patients EMR [employer]ism Affiliation is: [place]. The reason the Patient came to the hospital is:   Patient Active Problem List    Diagnosis Date Noted    Status post arterial stent 12/26/2018    High cholesterol 12/26/2018    Hypertension 12/26/2018    Peripheral arterial disease ([facility] [address] 12/26/2018    COPD (chronic obstructive pulmonary disease) ([facility] 12/26/2018    Insomnia 12/26/2018        The  provided the following Interventions:  Initiated a relationship of care and support. Explored issues of jagjit, spirituality and/or Orthodoxy needs while hospitalized. Listened empathically. Provided chaplaincy education. Provided information about Spiritual Care Services. Offered prayer and assurance of continued prayers on patient's behalf. Chart reviewed. The following outcomes were achieved:  Patient shared some information about their medical narrative and spiritual journey/beliefs. Patient processed feeling about current hospitalization. Patient expressed gratitude for the 's visit. Assessment:  Patient did not indicate any spiritual or Orthodoxy issues which require Spiritual Care Services interventions at this time. Patient does not have any Orthodoxy/cultural needs that will affect patients preferences in health care. Plan:  Chaplains will continue to follow and will provide pastoral care on an as needed or requested basis.  recommends bedside caregivers page  on duty if patient shows signs of acute spiritual or emotional distress.     [address] [facility]   Staff [facility]   [phone]

## 2018-12-27 NOTE — PROGRESS NOTES
Reason for Admission:   history of severe atherosclerosis and claudication who was seen at [facility] today for bladder biopsy and noted to have discoloration of his right leg. Pain has subsided according to patient report. He is s/p right popliteal artery atherectomy and angioplasty on 12/13. Likely popliteal dissection vs. Thrombosis. Will plan for angiogram 12/27    RRAT Score:   9                 Plan for utilizing home health:  Unlikely                    Likelihood of Readmission:  Low-Mod                         Transition of Care Plan:   Home with Spouse    NOK: Abdoulaye Moss (spouse) [phone]. Patient has designated his wife to participate in his/her discharge plan and to receive any needed information. Abdoulaye Moss (spouse) [phone]. Verified face sheet information is correct. Insurance: [facility]/BC. Lives with spouse in [place]. Was independent with ADLs and driving PTA. DME: None. Prior Services: None. Plan Home.  States \" I don't need anything and I should be going home today after the procedure\"    Care Management Interventions  PCP Verified by CM: Yes(Avelino Ely(saw 3-4 weeks ago))  Mode of Transport at Discharge: Self(Spouse will transport home)  Transition of Care Consult (CM Consult): Discharge Planning  Current Support Network: Lives with Spouse  Confirm Follow Up Transport: Self  Plan discussed with Pt/Family/Caregiver: Yes  Discharge Location  Discharge Placement: Home    Demetrio Mcdermott RN    Outcomes Manager  [phone]  [phone]

## 2018-12-27 NOTE — PROGRESS NOTES
2441 Received patient from June W RN. Patient is alert and oriented x 4. Called MD Perez for new orders for which he ordered D/C Heparin drip on Patient. Enforced NPO for patient procedure in A.M.    2015 During initial discussion, patient stated he wishes not to stay if doctors are not going to do anything, Explained that MD's will write orders and we don't know except that they plan to do it that day, never when.       2200 Patient requested Sleeping medication and to be left alone as much as possible during night, Hourly check policy explained. 0015 VS taken and steady, per policy. 0407 VS taken and 4 p's checked, patient anxious for us to let him go back to sleep. 0715 Bedside and Verbal shift change report given to aYn Vital RN. (oncoming nurse) by Linda Felton RN (offgoing nurse). Report included the following information SBAR, Kardex, Procedure Summary and MAR.  Patient once again stated that he is very anxious to get started with angiogram

## 2018-12-27 NOTE — BRIEF OP NOTE
BRIEF OPERATIVE NOTE    Date of Procedure: 12/27/2018   Preoperative Diagnosis: PVD (peripheral vascular disease) ([facility]) [I73.9]  Postoperative Diagnosis: Right above knee popliteal artery occlusion. Procedure(s):  ANGIOGRAPHY LOWER EXT RIGHT  Surgeon(s) and Role:     * Nikkie Mitchell MD - Primary             Surgical Staff:  CV Monitor: Connie Reyes  CV Scrub: Oni Garduno; Cinda Chun  CV Circulator: Kristin Ma  No case tracking events are documented in the log.   Anesthesia: Local with sedation  Estimated Blood Loss: minimal  Specimens: None  Findings: Right popliteal artery occlusion  Complications: None  Implants: * No implants in log *

## 2018-12-27 NOTE — PROGRESS NOTES
TRANSFER - IN REPORT:    Telephone report received from Azalea (name) on Magalis Gardner  being received from cath/vascular lab (unit) for routine post - op      Report consisted of patients Situation, Background, Assessment and   Recommendations(SBAR). Information from the following report(s) SBAR, Procedure Summary, MAR and Cardiac Rhythm NSR was reviewed with the receiving nurse. Opportunity for questions and clarification was provided.

## 2018-12-27 NOTE — H&P
[facility]  [address] [place], [address]  Dr. Mehdi Velasquez, Dr. Chung Chavez , Dr. Magda Tucker  [phone] FAX# [phone]         History and Physical    Patient: Maci Davidson MRN: [id number]  SSN: [id number]    YOB: 1945  Age: 74 y.o. Sex: male      Subjective:      Maci Davidson is a 74 y.o. male who presents with ischemic right lower leg. Now with no pain. Able to move toes and walk on leg. Recent history of right popliteal artery atherectomy with stent placement. Past Medical History:   Diagnosis Date    High cholesterol     Hypertension      Past Surgical History:   Procedure Laterality Date    HX APPENDECTOMY      HX MOHS PROCEDURES Left     HX TONSIL AND ADENOIDECTOMY        No family history on file. Social History     Tobacco Use    Smoking status: Current Every Day Smoker     Packs/day: 1.00   Substance Use Topics    Alcohol use: Yes     Comment: \" beer 3/day\"      Prior to Admission medications    Medication Sig Start Date End Date Taking? Authorizing Provider   rivaroxaban (XARELTO) 20 mg tab tablet Take 20 mg by mouth daily. Yes Provider, Historical   escitalopram oxalate (LEXAPRO) 5 mg tablet Take 5 mg by mouth daily. Yes Provider, Historical   diazePAM (VALIUM) 5 mg tablet Take 5 mg by mouth every eight (8) hours as needed for Anxiety. Yes Provider, Historical   budesonide-formoterol (SYMBICORT) 160-4.5 mcg/actuation HFAA Take 2 Puffs by inhalation two (2) times a day. Yes Provider, Historical   losartan-hydrochlorothiazide (HYZAAR) 100-25 mg per tablet Take 1 Tab by mouth daily. Other, MD Alta   rosuvastatin (CRESTOR) 40 mg tablet Take 40 mg by mouth nightly. Alta Kern MD   temazepam (RESTORIL) 30 mg capsule Take 30 mg by mouth nightly as needed for Sleep.     Other, MD Alta        Allergies   Allergen Reactions    Augmentin [Amoxicillin-Pot Clavulanate] Diarrhea       Review of Systems:  Pertinent items are noted in the History of Present Illness. Objective:     Vitals:    12/26/18 2025 12/27/18 0046 12/27/18 0416 12/27/18 0741   BP: 130/70 101/59 106/63 133/67   Pulse: 84 78 89 (!) 56   Resp: 19 19 19 19   Temp: 98.5 °F (36.9 °C) 98.4 °F (36.9 °C) 98.5 °F (36.9 °C) 97.9 °F (36.6 °C)   SpO2: 97% 90% 94% 93%   Weight:       Height:            Physical Exam:  GENERAL: alert, cooperative, no distress, appears stated age  EYE: negative  THROAT & NECK: normal and no erythema or exudates noted. LUNG: clear to auscultation bilaterally  HEART: regular rate and rhythm, S1, S2 normal, no murmur, click, rub or gallop  ABDOMEN: soft, non-tender. Bowel sounds normal. No masses,  no organomegaly  EXTREMITIES:  edema right foot with mottling of lower leg. No signals obtained at pedals      Assessment:     Hospital Problems  Never Reviewed          Codes Class Noted POA    * (Principal) Status post arterial stent ICD-10-CM: Z95.9  ICD-9-CM: V45.89  12/26/2018 Unknown        High cholesterol ICD-10-CM: E78.00  ICD-9-CM: 272.0  12/26/2018 Unknown        Hypertension ICD-10-CM: I10  ICD-9-CM: 401.9  12/26/2018 Unknown        Peripheral arterial disease ([facility] ICD-10-CM: I73.9  ICD-9-CM: 443.9  12/26/2018 Unknown        COPD (chronic obstructive pulmonary disease) ([facility] ICD-10-CM: J44.9  ICD-9-CM: 496  12/26/2018 Unknown        Insomnia ICD-10-CM: G47.00  ICD-9-CM: 780.52  12/26/2018 Unknown              Plan:     Angiogram with possible intervention right leg.      Signed By: Munir Vincent MD     December 27, 2018

## 2018-12-27 NOTE — PROGRESS NOTES
IVCU:    5209 Pt received via bed from cath/vascular lab post angiogram. Pt is alert and denies pain. Dressings to right femoral artery and right pedal artery punctures are clean, dry, and intact. No bleeding or hematomas present. VS stable. Will continue to monitor per IVCU protocol. Awaiting MD post op orders. Plan to return pt to room 2207. MD to speak with pt wife who is in the waiting area per cath lab staff. 1611 Phlebotomist at the bedside to draw labs. Pt to be started on a heparin drip per written orders. Pt awaiting to speak with MD of procedure findings. Pt states that his wife has dementia and will not be able to relay procedure information to him or consent to anything on his behalf. 1636 Provided pt with a small sip of water. Pt tolerated well. 5 Spoke with Dr. Christiano Kraus on the phone to clarify orders. MD stated she spoke with the pt in the cath lab of need for bypass surgery tomorrow at 0730 and to start heparin drip. Pt may eat now and NPO after midnight. Explained this information to the pt and he was agreeable and gave verbal understanding. Provided pt with cracker and tolerated well. Diet order put in.      1721 TRANSFER - OUT REPORT:    Telephone report given to Wilver Navarro (name) on [facility]  being transferred to [address] [place] (unit) for routine progression of care       Report consisted of patients Situation, Background, Assessment and   Recommendations(SBAR). Information from the following report(s) SBAR, Procedure Summary and MAR was reviewed with the receiving nurse. Lines:       Opportunity for questions and clarification was provided. Patient transported with:   Registered Nurse     470 20 145 Pt transported to room 2207. HOB 20 degrees. No bleeding or hematoma present at right groin or right pedal puncture sites. Call bell in reach. Wife at the bedside. RN aware of pt arrival. Pt left in stable condition with the CNA at the bedside.

## 2018-12-27 NOTE — PROGRESS NOTES
0730  Received pt on bed, wife at bedside during  report at bedside, he was telling us how unhappy he is, about  the doctor about his staying over in the hospital waited for24 hours before the procedure. , he said he was at [place] and was transfer here but until now nothing was done yet. NPO maintained  Told him I will call angiogram and Dr Alvaro Alba. Told him I will call at 0900.    0830  Was very upset telling every person he see in the hallway, refused labs drawn. Called again angio for the 2nd time, no body answering. 0900 I called office of Dr Alvaro Alba, per MD  post for 1400 procedure, updated pt. More relax at this time, refused to take his 0900 meds until the procedure is done. Right leg is warm to touch no pain at this time,  Discolored from the foot up to below the knee and bruise in the inner thigh. MD aware of pt refusing her APTT, no new order. Pedal pulse with  Doppler, jason, per pt there is feeling sensation but the right foot is less. Walk well so far. 1100  Waiting for 1400, NPO maintained. 1345  To   Angio via  Stretcher. 1803  Received pt from  Angiogram, bedrest maintained until 2015, informed pt about it, aware, right  Foot the buttom  Is cool to touch, wiggle toes warm to     touch . 1845  IV site nom redness and no swelling , explained again about Heparin drip, started at 12 units which 7.3  Cc an hour, next APTT is 0045 requested, no pain remain  Bedrest, aware to be NPO  after 12Mn. Doubl check with another RN, informed to be NPO after 12Mn, no bleeding in the dressing.

## 2018-12-27 NOTE — PROGRESS NOTES
TRANSFER - IN REPORT:    Verbal report received from Leo Griffith on Sohan Reed  being received from PACU for routine post - op      Report consisted of patients Situation, Background, Assessment and   Recommendations(SBAR). Information from the following report(s) Procedure Summary was reviewed with the receiving nurse. Opportunity for questions and clarification was provided.

## 2018-12-28 ENCOUNTER — ANESTHESIA (OUTPATIENT)
Dept: SURGERY | Age: 73
DRG: 253 | End: 2018-12-28
Payer: MEDICARE

## 2018-12-28 ENCOUNTER — ANESTHESIA EVENT (OUTPATIENT)
Dept: SURGERY | Age: 73
DRG: 253 | End: 2018-12-28
Payer: MEDICARE

## 2018-12-28 ENCOUNTER — APPOINTMENT (OUTPATIENT)
Dept: GENERAL RADIOLOGY | Age: 73
DRG: 253 | End: 2018-12-28
Attending: SURGERY
Payer: MEDICARE

## 2018-12-28 LAB
ACT BLD: 130 SECS (ref 79–138)
APTT PPP: 42.2 SEC (ref 23–36.4)
BASOPHILS # BLD: 0 K/UL (ref 0–0.1)
BASOPHILS NFR BLD: 1 % (ref 0–2)
DIFFERENTIAL METHOD BLD: ABNORMAL
EOSINOPHIL # BLD: 0.2 K/UL (ref 0–0.4)
EOSINOPHIL NFR BLD: 2 % (ref 0–5)
ERYTHROCYTE [DISTWIDTH] IN BLOOD BY AUTOMATED COUNT: 14.4 % (ref 11.6–14.5)
HCT VFR BLD AUTO: 39 % (ref 36–48)
HGB BLD-MCNC: 12.6 G/DL (ref 13–16)
LYMPHOCYTES # BLD: 1.6 K/UL (ref 0.9–3.6)
LYMPHOCYTES NFR BLD: 20 % (ref 21–52)
MCH RBC QN AUTO: 30.2 PG (ref 24–34)
MCHC RBC AUTO-ENTMCNC: 32.3 G/DL (ref 31–37)
MCV RBC AUTO: 93.5 FL (ref 74–97)
MONOCYTES # BLD: 0.8 K/UL (ref 0.05–1.2)
MONOCYTES NFR BLD: 10 % (ref 3–10)
NEUTS SEG # BLD: 5.6 K/UL (ref 1.8–8)
NEUTS SEG NFR BLD: 67 % (ref 40–73)
PLATELET # BLD AUTO: 252 K/UL (ref 135–420)
PMV BLD AUTO: 11.5 FL (ref 9.2–11.8)
RBC # BLD AUTO: 4.17 M/UL (ref 4.7–5.5)
WBC # BLD AUTO: 8.2 K/UL (ref 4.6–13.2)

## 2018-12-28 PROCEDURE — 77030010938 HC CLP LIG TELE -A: Performed by: SURGERY

## 2018-12-28 PROCEDURE — 77030002524 HC INSTR CLMP FGRTY EDWD -B: Performed by: SURGERY

## 2018-12-28 PROCEDURE — 74011000250 HC RX REV CODE- 250: Performed by: SURGERY

## 2018-12-28 PROCEDURE — 74011250636 HC RX REV CODE- 250/636: Performed by: SURGERY

## 2018-12-28 PROCEDURE — 94640 AIRWAY INHALATION TREATMENT: CPT

## 2018-12-28 PROCEDURE — 75710 ARTERY X-RAYS ARM/LEG: CPT

## 2018-12-28 PROCEDURE — 77030013797 HC KT TRNSDUC PRSSR EDWD -A: Performed by: ANESTHESIOLOGY

## 2018-12-28 PROCEDURE — 77030018673: Performed by: SURGERY

## 2018-12-28 PROCEDURE — 76210000006 HC OR PH I REC 0.5 TO 1 HR: Performed by: SURGERY

## 2018-12-28 PROCEDURE — 85025 COMPLETE CBC W/AUTO DIFF WBC: CPT

## 2018-12-28 PROCEDURE — 65610000006 HC RM INTENSIVE CARE

## 2018-12-28 PROCEDURE — 77030014647 HC SEAL FBRN TISSL BAXT -D: Performed by: SURGERY

## 2018-12-28 PROCEDURE — 77030002986 HC SUT PROL J&J -A: Performed by: SURGERY

## 2018-12-28 PROCEDURE — 85730 THROMBOPLASTIN TIME PARTIAL: CPT

## 2018-12-28 PROCEDURE — 77030005518 HC CATH URETH FOL 2W BARD -B: Performed by: SURGERY

## 2018-12-28 PROCEDURE — 77030011265 HC ELECTRD BLD HEX COVD -A: Performed by: SURGERY

## 2018-12-28 PROCEDURE — 77030008477 HC STYL SATN SLP COVD -A: Performed by: ANESTHESIOLOGY

## 2018-12-28 PROCEDURE — 77030013079 HC BLNKT BAIR HGGR 3M -A: Performed by: ANESTHESIOLOGY

## 2018-12-28 PROCEDURE — 76010000137 HC OR TIME 5 TO 5.5 HR: Performed by: SURGERY

## 2018-12-28 PROCEDURE — 74011250636 HC RX REV CODE- 250/636: Performed by: ANESTHESIOLOGY

## 2018-12-28 PROCEDURE — 77030039266 HC ADH SKN EXOFIN S2SG -A: Performed by: SURGERY

## 2018-12-28 PROCEDURE — 77030014007 HC SPNG HEMSTAT J&J -B: Performed by: SURGERY

## 2018-12-28 PROCEDURE — 77030002987 HC SUT PROL J&J -B: Performed by: SURGERY

## 2018-12-28 PROCEDURE — 77030002996 HC SUT SLK J&J -A: Performed by: SURGERY

## 2018-12-28 PROCEDURE — 74011250636 HC RX REV CODE- 250/636

## 2018-12-28 PROCEDURE — 74011000250 HC RX REV CODE- 250

## 2018-12-28 PROCEDURE — 86923 COMPATIBILITY TEST ELECTRIC: CPT

## 2018-12-28 PROCEDURE — 74011000272 HC RX REV CODE- 272: Performed by: SURGERY

## 2018-12-28 PROCEDURE — 77030020256 HC SOL INJ NACL 0.9%  500ML: Performed by: SURGERY

## 2018-12-28 PROCEDURE — 77030008462 HC STPLR SKN PROX J&J -A: Performed by: SURGERY

## 2018-12-28 PROCEDURE — 74011636320 HC RX REV CODE- 636/320: Performed by: SURGERY

## 2018-12-28 PROCEDURE — 77030008683 HC TU ET CUF COVD -A: Performed by: ANESTHESIOLOGY

## 2018-12-28 PROCEDURE — 74011250637 HC RX REV CODE- 250/637: Performed by: HOSPITALIST

## 2018-12-28 PROCEDURE — 86900 BLOOD TYPING SEROLOGIC ABO: CPT

## 2018-12-28 PROCEDURE — 77030037878 HC DRSG MEPILEX >48IN BORD MOLN -B

## 2018-12-28 PROCEDURE — 77030019908 HC STETH ESOPH SIMS -A: Performed by: ANESTHESIOLOGY

## 2018-12-28 PROCEDURE — 041K09L BYPASS RIGHT FEMORAL ARTERY TO POPLITEAL ARTERY WITH AUTOLOGOUS VENOUS TISSUE, OPEN APPROACH: ICD-10-PCS | Performed by: SURGERY

## 2018-12-28 PROCEDURE — 77030005401 HC CATH RAD ARRO -A: Performed by: ANESTHESIOLOGY

## 2018-12-28 PROCEDURE — 77030002933 HC SUT MCRYL J&J -A: Performed by: SURGERY

## 2018-12-28 PROCEDURE — 36415 COLL VENOUS BLD VENIPUNCTURE: CPT

## 2018-12-28 PROCEDURE — 76060000041 HC ANESTHESIA 5 TO 5.5 HR: Performed by: SURGERY

## 2018-12-28 PROCEDURE — 74011000250 HC RX REV CODE- 250: Performed by: HOSPITALIST

## 2018-12-28 PROCEDURE — 77030031139 HC SUT VCRL2 J&J -A: Performed by: SURGERY

## 2018-12-28 PROCEDURE — 77030018836 HC SOL IRR NACL ICUM -A: Performed by: SURGERY

## 2018-12-28 PROCEDURE — C1757 CATH, THROMBECTOMY/EMBOLECT: HCPCS | Performed by: SURGERY

## 2018-12-28 PROCEDURE — 85347 COAGULATION TIME ACTIVATED: CPT

## 2018-12-28 PROCEDURE — 77030011391 HC HD CUT VEIN URAC -E: Performed by: SURGERY

## 2018-12-28 RX ORDER — ASPIRIN 325 MG
325 TABLET ORAL DAILY
Status: DISCONTINUED | OUTPATIENT
Start: 2018-12-29 | End: 2018-12-30

## 2018-12-28 RX ORDER — HEPARIN SODIUM 5000 [USP'U]/ML
5000 INJECTION, SOLUTION INTRAVENOUS; SUBCUTANEOUS EVERY 8 HOURS
Status: DISCONTINUED | OUTPATIENT
Start: 2018-12-28 | End: 2018-12-29

## 2018-12-28 RX ORDER — DOPAMINE HYDROCHLORIDE 160 MG/100ML
INJECTION, SOLUTION INTRAVENOUS AS NEEDED
Status: DISCONTINUED | OUTPATIENT
Start: 2018-12-28 | End: 2018-12-28

## 2018-12-28 RX ORDER — DOPAMINE HYDROCHLORIDE 160 MG/100ML
INJECTION, SOLUTION INTRAVENOUS
Status: DISCONTINUED | OUTPATIENT
Start: 2018-12-28 | End: 2018-12-28 | Stop reason: HOSPADM

## 2018-12-28 RX ORDER — SODIUM CHLORIDE 9 MG/ML
250 INJECTION, SOLUTION INTRAVENOUS AS NEEDED
Status: DISCONTINUED | OUTPATIENT
Start: 2018-12-28 | End: 2019-01-04 | Stop reason: HOSPADM

## 2018-12-28 RX ORDER — LIDOCAINE HYDROCHLORIDE 20 MG/ML
INJECTION, SOLUTION EPIDURAL; INFILTRATION; INTRACAUDAL; PERINEURAL AS NEEDED
Status: DISCONTINUED | OUTPATIENT
Start: 2018-12-28 | End: 2018-12-28 | Stop reason: HOSPADM

## 2018-12-28 RX ORDER — FENTANYL CITRATE 50 UG/ML
INJECTION, SOLUTION INTRAMUSCULAR; INTRAVENOUS AS NEEDED
Status: DISCONTINUED | OUTPATIENT
Start: 2018-12-28 | End: 2018-12-28 | Stop reason: HOSPADM

## 2018-12-28 RX ORDER — ONDANSETRON 2 MG/ML
INJECTION INTRAMUSCULAR; INTRAVENOUS AS NEEDED
Status: DISCONTINUED | OUTPATIENT
Start: 2018-12-28 | End: 2018-12-28 | Stop reason: HOSPADM

## 2018-12-28 RX ORDER — SUCCINYLCHOLINE CHLORIDE 20 MG/ML
INJECTION INTRAMUSCULAR; INTRAVENOUS AS NEEDED
Status: DISCONTINUED | OUTPATIENT
Start: 2018-12-28 | End: 2018-12-28 | Stop reason: HOSPADM

## 2018-12-28 RX ORDER — GLYCOPYRROLATE 0.2 MG/ML
INJECTION INTRAMUSCULAR; INTRAVENOUS AS NEEDED
Status: DISCONTINUED | OUTPATIENT
Start: 2018-12-28 | End: 2018-12-28 | Stop reason: HOSPADM

## 2018-12-28 RX ORDER — HYDROMORPHONE HYDROCHLORIDE 1 MG/ML
1 INJECTION, SOLUTION INTRAMUSCULAR; INTRAVENOUS; SUBCUTANEOUS
Status: DISCONTINUED | OUTPATIENT
Start: 2018-12-28 | End: 2018-12-30

## 2018-12-28 RX ORDER — SODIUM CHLORIDE 0.9 % (FLUSH) 0.9 %
5-10 SYRINGE (ML) INJECTION AS NEEDED
Status: DISCONTINUED | OUTPATIENT
Start: 2018-12-28 | End: 2019-01-04 | Stop reason: HOSPADM

## 2018-12-28 RX ORDER — SODIUM CHLORIDE, SODIUM LACTATE, POTASSIUM CHLORIDE, CALCIUM CHLORIDE 600; 310; 30; 20 MG/100ML; MG/100ML; MG/100ML; MG/100ML
50 INJECTION, SOLUTION INTRAVENOUS CONTINUOUS
Status: DISCONTINUED | OUTPATIENT
Start: 2018-12-28 | End: 2018-12-30

## 2018-12-28 RX ORDER — VECURONIUM BROMIDE FOR INJECTION 1 MG/ML
INJECTION, POWDER, LYOPHILIZED, FOR SOLUTION INTRAVENOUS AS NEEDED
Status: DISCONTINUED | OUTPATIENT
Start: 2018-12-28 | End: 2018-12-28 | Stop reason: HOSPADM

## 2018-12-28 RX ORDER — FAMOTIDINE 10 MG/ML
INJECTION INTRAVENOUS
Status: DISPENSED
Start: 2018-12-28 | End: 2018-12-28

## 2018-12-28 RX ORDER — SODIUM CHLORIDE 0.9 % (FLUSH) 0.9 %
5-10 SYRINGE (ML) INJECTION EVERY 8 HOURS
Status: DISCONTINUED | OUTPATIENT
Start: 2018-12-28 | End: 2019-01-04 | Stop reason: HOSPADM

## 2018-12-28 RX ORDER — HYDROCODONE BITARTRATE AND ACETAMINOPHEN 5; 325 MG/1; MG/1
1 TABLET ORAL
Status: DISCONTINUED | OUTPATIENT
Start: 2018-12-28 | End: 2018-12-30

## 2018-12-28 RX ORDER — ACETAMINOPHEN 325 MG/1
650 TABLET ORAL
Status: DISCONTINUED | OUTPATIENT
Start: 2018-12-28 | End: 2019-01-04 | Stop reason: HOSPADM

## 2018-12-28 RX ORDER — CEFAZOLIN SODIUM 2 G/50ML
2 SOLUTION INTRAVENOUS EVERY 8 HOURS
Status: COMPLETED | OUTPATIENT
Start: 2018-12-28 | End: 2018-12-28

## 2018-12-28 RX ORDER — PROPOFOL 10 MG/ML
INJECTION, EMULSION INTRAVENOUS AS NEEDED
Status: DISCONTINUED | OUTPATIENT
Start: 2018-12-28 | End: 2018-12-28 | Stop reason: HOSPADM

## 2018-12-28 RX ORDER — DEXTROSE, SODIUM CHLORIDE, AND POTASSIUM CHLORIDE 5; .45; .15 G/100ML; G/100ML; G/100ML
75 INJECTION INTRAVENOUS CONTINUOUS
Status: DISCONTINUED | OUTPATIENT
Start: 2018-12-28 | End: 2018-12-31

## 2018-12-28 RX ORDER — CEFAZOLIN SODIUM 2 G/50ML
2 SOLUTION INTRAVENOUS
Status: COMPLETED | OUTPATIENT
Start: 2018-12-28 | End: 2018-12-28

## 2018-12-28 RX ORDER — ONDANSETRON 2 MG/ML
4 INJECTION INTRAMUSCULAR; INTRAVENOUS
Status: DISCONTINUED | OUTPATIENT
Start: 2018-12-28 | End: 2019-01-04 | Stop reason: HOSPADM

## 2018-12-28 RX ORDER — NEOSTIGMINE METHYLSULFATE 5 MG/5 ML
SYRINGE (ML) INTRAVENOUS AS NEEDED
Status: DISCONTINUED | OUTPATIENT
Start: 2018-12-28 | End: 2018-12-28 | Stop reason: HOSPADM

## 2018-12-28 RX ORDER — NALOXONE HYDROCHLORIDE 0.4 MG/ML
0.4 INJECTION, SOLUTION INTRAMUSCULAR; INTRAVENOUS; SUBCUTANEOUS AS NEEDED
Status: DISCONTINUED | OUTPATIENT
Start: 2018-12-28 | End: 2019-01-04 | Stop reason: HOSPADM

## 2018-12-28 RX ORDER — HEPARIN SODIUM 1000 [USP'U]/ML
INJECTION, SOLUTION INTRAVENOUS; SUBCUTANEOUS AS NEEDED
Status: DISCONTINUED | OUTPATIENT
Start: 2018-12-28 | End: 2018-12-28 | Stop reason: HOSPADM

## 2018-12-28 RX ADMIN — DEXTROSE MONOHYDRATE, SODIUM CHLORIDE, AND POTASSIUM CHLORIDE 75 ML/HR: 50; 4.5; 1.49 INJECTION, SOLUTION INTRAVENOUS at 17:20

## 2018-12-28 RX ADMIN — GLYCOPYRROLATE 0.3 MG: 0.2 INJECTION INTRAMUSCULAR; INTRAVENOUS at 12:53

## 2018-12-28 RX ADMIN — VECURONIUM BROMIDE FOR INJECTION 2 MG: 1 INJECTION, POWDER, LYOPHILIZED, FOR SOLUTION INTRAVENOUS at 08:48

## 2018-12-28 RX ADMIN — ARFORMOTEROL TARTRATE 15 MCG: 15 SOLUTION RESPIRATORY (INHALATION) at 20:28

## 2018-12-28 RX ADMIN — CEFAZOLIN SODIUM 2 G: 2 SOLUTION INTRAVENOUS at 22:33

## 2018-12-28 RX ADMIN — ROSUVASTATIN CALCIUM 40 MG: 20 TABLET, FILM COATED ORAL at 21:07

## 2018-12-28 RX ADMIN — Medication 10 ML: at 15:07

## 2018-12-28 RX ADMIN — TEMAZEPAM 30 MG: 15 CAPSULE ORAL at 22:32

## 2018-12-28 RX ADMIN — DOPAMINE HYDROCHLORIDE 3 MCG/KG/MIN: 160 INJECTION, SOLUTION INTRAVENOUS at 08:09

## 2018-12-28 RX ADMIN — VECURONIUM BROMIDE FOR INJECTION 5 MG: 1 INJECTION, POWDER, LYOPHILIZED, FOR SOLUTION INTRAVENOUS at 07:56

## 2018-12-28 RX ADMIN — SODIUM CHLORIDE, SODIUM LACTATE, POTASSIUM CHLORIDE, AND CALCIUM CHLORIDE: 600; 310; 30; 20 INJECTION, SOLUTION INTRAVENOUS at 11:55

## 2018-12-28 RX ADMIN — SODIUM CHLORIDE, SODIUM LACTATE, POTASSIUM CHLORIDE, AND CALCIUM CHLORIDE 50 ML/HR: 600; 310; 30; 20 INJECTION, SOLUTION INTRAVENOUS at 07:29

## 2018-12-28 RX ADMIN — CEFAZOLIN SODIUM 2 G: 2 SOLUTION INTRAVENOUS at 15:05

## 2018-12-28 RX ADMIN — FENTANYL CITRATE 100 MCG: 50 INJECTION, SOLUTION INTRAMUSCULAR; INTRAVENOUS at 07:51

## 2018-12-28 RX ADMIN — HYDROMORPHONE HYDROCHLORIDE 1 MG: 1 INJECTION, SOLUTION INTRAMUSCULAR; INTRAVENOUS; SUBCUTANEOUS at 16:43

## 2018-12-28 RX ADMIN — Medication 2 MG: at 12:53

## 2018-12-28 RX ADMIN — BUDESONIDE 500 MCG: 0.5 INHALANT RESPIRATORY (INHALATION) at 20:28

## 2018-12-28 RX ADMIN — Medication 10 ML: at 21:09

## 2018-12-28 RX ADMIN — FAMOTIDINE 20 MG: 10 INJECTION, SOLUTION INTRAVENOUS at 21:07

## 2018-12-28 RX ADMIN — PROPOFOL 100 MG: 10 INJECTION, EMULSION INTRAVENOUS at 07:51

## 2018-12-28 RX ADMIN — HEPARIN SODIUM 5000 UNITS: 5000 INJECTION INTRAVENOUS; SUBCUTANEOUS at 15:07

## 2018-12-28 RX ADMIN — ONDANSETRON 4 MG: 2 INJECTION INTRAMUSCULAR; INTRAVENOUS at 12:52

## 2018-12-28 RX ADMIN — HEPARIN SODIUM 6000 UNITS: 1000 INJECTION, SOLUTION INTRAVENOUS; SUBCUTANEOUS at 09:40

## 2018-12-28 RX ADMIN — DEXTROSE MONOHYDRATE, SODIUM CHLORIDE, AND POTASSIUM CHLORIDE 75 ML/HR: 50; 4.5; 1.49 INJECTION, SOLUTION INTRAVENOUS at 15:05

## 2018-12-28 RX ADMIN — FENTANYL CITRATE 50 MCG: 50 INJECTION, SOLUTION INTRAMUSCULAR; INTRAVENOUS at 11:37

## 2018-12-28 RX ADMIN — CEFAZOLIN SODIUM 2 G: 2 SOLUTION INTRAVENOUS at 08:16

## 2018-12-28 RX ADMIN — FAMOTIDINE 20 MG: 10 INJECTION, SOLUTION INTRAVENOUS at 07:35

## 2018-12-28 RX ADMIN — HEPARIN SODIUM 5000 UNITS: 5000 INJECTION INTRAVENOUS; SUBCUTANEOUS at 22:33

## 2018-12-28 RX ADMIN — SUCCINYLCHOLINE CHLORIDE 100 MG: 20 INJECTION INTRAMUSCULAR; INTRAVENOUS at 07:51

## 2018-12-28 RX ADMIN — LIDOCAINE HYDROCHLORIDE 60 MG: 20 INJECTION, SOLUTION EPIDURAL; INFILTRATION; INTRACAUDAL; PERINEURAL at 07:51

## 2018-12-28 NOTE — PROGRESS NOTES
Problem: Falls - Risk of  Goal: *Absence of Falls  Document Alexia Fall Risk and appropriate interventions in the flowsheet. Outcome: Progressing Towards Goal  Fall Risk Interventions:            Medication Interventions: Assess postural VS orthostatic hypotension, Patient to call before getting OOB, Teach patient to arise slowly    Elimination Interventions: Call light in reach, Patient to call for help with toileting needs, Toileting schedule/hourly rounds, Urinal in reach             Problem: Pressure Injury - Risk of  Goal: *Prevention of pressure injury  Document Gee Scale and appropriate interventions in the flowsheet. Outcome: Progressing Towards Goal  Pressure Injury Interventions: Activity Interventions: Pressure redistribution bed/mattress(bed type), Increase time out of bed    Mobility Interventions: HOB 30 degrees or less, Pressure redistribution bed/mattress (bed type), PT/OT evaluation, Turn and reposition approx.  every two hours(pillow and wedges)    Nutrition Interventions: Document food/fluid/supplement intake

## 2018-12-28 NOTE — BRIEF OP NOTE
BRIEF OPERATIVE NOTE Date of Procedure: 12/28/2018 Preoperative Diagnosis: Atherosclerotic occlusive disease with critical right leg ischemia Postoperative Diagnosis: Same Procedure(s): 
Right femoral to popliteal artery bypass with non-reversed saphenous vein graft. Surgeon(s) and Role: * Celeste Del Cid MD - Primary Surgical Staff: 
Circ-1: Rosana Maki Circ-2: Lynn Scott RN 
Circ-Relief: Tamie Navy Radiology Technician: Fausto Meeks Scrub Tech-1: Zaria Ward Surg Asst-1: Adriana Pratt Event Time In Time Out Incision Start 12/28/2018 0827 Incision Close 12/28/2018 1300 Anesthesia: General  
Estimated Blood Loss: 100mL Specimens: None Findings: Final angio shows two vessel runoff right foot via PT and DP Complications: None Implants: None

## 2018-12-28 NOTE — INTERVAL H&P NOTE
H&P Update: 
Luis Pitts was seen and examined. History and physical has been reviewed. The patient has been examined.  There have been no significant clinical changes since the completion of the originally dated History and Physical. 
 
Signed By: Lexy Arora MD   
 December 28, 2018 7:19 AM

## 2018-12-28 NOTE — PROGRESS NOTES
[facility] Patient: Malorie Thompson MRN: [id number]  CSN: [id number] YOB: 1945  Age: 74 y.o. Sex: male DOA: 12/26/2018 LOS:  LOS: 2 days Interval History:   
Per Dr. Mallory Shukla HPI: Kimberly Velasquez is a 74 y.o. male with a PMHx of HTN, HLD, PAD who we were consulted for medical management during admission for PAD. The patient had right popliteal artery occlusion and SFA stenosis with atherectomy, right pop a. Stent placement and right SFA angioplasty on 12/13. The patient admits to being discharged home. He then states that his RLE became darker in color and developed black toes and loss of sensation plantar aspect. It is unclear if this happened before or after procedure because the patient is a poor historian. Regardless, those particular symptoms improved prior to presentation today. The patient states he was at elective bladder procedure today to evaluate previously treated cyst when he told physician his leg needed to be checked out before the procedure. [facility] due to concern for arterial occlusion. He states he had been on xarelto but had stopped taking for the bladder procedure about a week ago. In the ED, his doppler pulses were heard on RLE. He complains of pain, discoloration of his RLE, but sensation is essentially intact. \" Vascular consulted. Angiogram tomorrow 12/27; heparin gtt (in notes, but not on currently), NPO after MN. 12/27/18: angiogram per vascular today. Defer further recommendations per them. Labs reviewed. HDS.  
 
12/28/18: OR for femoral-arterial bypass R per vascular surgery. Further recommendations per vascular. Subjective:  
  
Pt ready to go home No pain Objective:  
  
Visit Vitals /62 (BP 1 Location: Left arm, BP Patient Position: At rest;Supine) Pulse 61 Temp 98.8 °F (37.1 °C) Resp 18 Ht 5' 8\" (1.727 m) Wt 61.2 kg (135 lb) SpO2 95% BMI 20.53 kg/m² Physical Exam: 
General: alert, cooperative Lungs: clear to auscultation bilaterally Heart: bradycardiac int, regular rhythm, S1, S2 normal 
Abdomen: soft, non tender, non distended. Normoactive bowel sounds. Skin: noted discoloration to RLE; + doppler pulses RLE (for bypass today 12/28/18)) Neurologic: Grossly normal; frustrated Intake and Output: 
Current Shift:  No intake/output data recorded. Last three shifts:  12/26 1901 - 12/28 0700 In: 245 [P.O.:245] Out: 203 [OQQOC:509] Recent Results (from the past 24 hour(s)) CBC WITH AUTOMATED DIFF Collection Time: 12/27/18  4:20 PM  
Result Value Ref Range WBC 7.7 4.6 - 13.2 K/uL  
 RBC 4.72 4.70 - 5.50 M/uL  
 HGB 14.5 13.0 - 16.0 g/dL HCT 45.4 36.0 - 48.0 % MCV 96.2 74.0 - 97.0 FL  
 MCH 30.7 24.0 - 34.0 PG  
 MCHC 31.9 31.0 - 37.0 g/dL  
 RDW 14.3 11.6 - 14.5 % PLATELET 340 614 - 363 K/uL MPV 10.7 9.2 - 11.8 FL  
 NEUTROPHILS 61 40 - 73 % LYMPHOCYTES 27 21 - 52 % MONOCYTES 8 3 - 10 % EOSINOPHILS 3 0 - 5 % BASOPHILS 1 0 - 2 %  
 ABS. NEUTROPHILS 4.8 1.8 - 8.0 K/UL  
 ABS. LYMPHOCYTES 2.1 0.9 - 3.6 K/UL  
 ABS. MONOCYTES 0.6 0.05 - 1.2 K/UL  
 ABS. EOSINOPHILS 0.2 0.0 - 0.4 K/UL  
 ABS. BASOPHILS 0.0 0.0 - 0.1 K/UL  
 DF AUTOMATED    
PTT Collection Time: 12/27/18  4:20 PM  
Result Value Ref Range aPTT 109.1 (H) 23.0 - 36.4 SEC  
PTT Collection Time: 12/28/18 12:23 AM  
Result Value Ref Range aPTT 42.2 (H) 23.0 - 36.4 SEC  
CBC WITH AUTOMATED DIFF Collection Time: 12/28/18 12:23 AM  
Result Value Ref Range WBC 8.2 4.6 - 13.2 K/uL  
 RBC 4.17 (L) 4.70 - 5.50 M/uL  
 HGB 12.6 (L) 13.0 - 16.0 g/dL HCT 39.0 36.0 - 48.0 % MCV 93.5 74.0 - 97.0 FL  
 MCH 30.2 24.0 - 34.0 PG  
 MCHC 32.3 31.0 - 37.0 g/dL  
 RDW 14.4 11.6 - 14.5 % PLATELET 157 758 - 162 K/uL MPV 11.5 9.2 - 11.8 FL  
 NEUTROPHILS 67 40 - 73 % LYMPHOCYTES 20 (L) 21 - 52 % MONOCYTES 10 3 - 10 % EOSINOPHILS 2 0 - 5 % BASOPHILS 1 0 - 2 %  
 ABS. NEUTROPHILS 5.6 1.8 - 8.0 K/UL  
 ABS. LYMPHOCYTES 1.6 0.9 - 3.6 K/UL  
 ABS. MONOCYTES 0.8 0.05 - 1.2 K/UL  
 ABS. EOSINOPHILS 0.2 0.0 - 0.4 K/UL  
 ABS. BASOPHILS 0.0 0.0 - 0.1 K/UL  
 DF AUTOMATED Lab Results Component Value Date/Time Glucose 89 12/26/2018 02:05 PM  
 Glucose 90 05/19/2015 12:49 PM  
  
 
Assessment/Plan:  
 
Patient Active Problem List  
Diagnosis Code  Status post arterial stent Z95.9  High cholesterol E78.00  Hypertension I10  
 Peripheral arterial disease ([facility]) I73.9  
 COPD (chronic obstructive pulmonary disease) ([facility]) J44.9  Insomnia G47.00 A/P: 
 
PAD s/p R popliteal artery atherectomy and angioplasty (12/13) 
-per vascular popliteal dissection vs thrombosis  
-angiogram 12/27  
-heparin gtt (in notes, but not currently on) -NPO after MN  
-defer further recommendations per vascular   
-femoral  Popliteal R bypass today 12/28/18 HTN 
-monitor BP 
-home medications High cholesterol 
-statin COPD 
-monitor sats -on room air currently  
-breathing tx Insomnia 
-valium prn  
 
DVT px 
-per primary JOHN PAUL Soto, NP-C [facility] OBDULIO:[phone] Office:  [phone]

## 2018-12-28 NOTE — PERIOP NOTES
Pts daughter, Daniel Moreno, called to [facility] for update on father. Notified Daniel Moreno that her contact information was not in the chart, but that she could speak with her mother. Mrs. Pierce Chong spoke with her daughter on the phone in [facility] regarding her father being in surgery at [facility]. Later spoke to pts son, Luz Smith, who stated his father told him he didn't need to come to hospital today for surgery. However, please call if needed. [phone] Per Luz Smith, other brother, Alf Holt, may be visiting later today. He is in [place]. Update: Alf Holt called [facility] asking for information on his father. Stated he was here last night with his parents and spoke to his fathers RN. Gee's demographic info is not in chart for medical information either [phone]. Gee plans to visit this afternoon. Charge RN Vesta and Manager Shilpi aware of the situation. Need to confirm with patient that his children are able to receive medical information.

## 2018-12-28 NOTE — PERIOP NOTES
pt arrived to [facility]. VS obtained. New, fresh gown provided, CHG wipes used. Pts undergarments given to wife in pt belonging bag. Surgical consent and pre-procedure checklist completed. Type and Screen drawn. PIV flushed. LR gtt started. Lab arrived for aPTT. Per Dr. Marbella Fowler, no need to draw lab due to heparin gtt d/c this morning. Wife at bedside. Call bell within reach.

## 2018-12-28 NOTE — PROGRESS NOTES
Physical Exam   Skin:           Assumed care of patient at 1930 report received from Lg Owusu RN. Received pt in bed alert & oriented and in NAD. Denies nausea & pain. Resp even & unlabored. Family at the bedside. Heparin drip at 7.3 ml / hour infusing well. Patient reports right foot bottom & heel no sensation. Able to wiggle toes. 2100 - Patient ambulates to BR & back to bed.    2230 - Pt in bed resting quietly and no apparent distress. 46 - Paged Dr. Stewart Fleeting orders received to stop Heparin drip at 0600 12/28/18.    0030 - Aptt lab work was drawn by Phlebotomy. 0225 - Awaiting Aptt result. 7202 - still no result of Aptt from lab.    0600 - Heparin drip stopped per MD orders. 8511 - OR staff in pt room. Pt off unit. 0730 - Bedside and Verbal shift change report given to Lg Owusu RN (oncoming nurse) by Talia Jaeger RN BSN (offgoing nurse). Report given with SBAR, Kardex, Intake/Output, MAR and Recent Results.

## 2018-12-28 NOTE — ANESTHESIA POSTPROCEDURE EVALUATION
Procedure(s): FEMORAL-POPLITEAL BYPASS. Anesthesia Post Evaluation Multimodal analgesia: multimodal analgesia used between 6 hours prior to anesthesia start to PACU discharge Patient location during evaluation: bedside Patient participation: complete - patient participated Level of consciousness: awake Pain management: adequate Airway patency: patent Anesthetic complications: no 
Cardiovascular status: acceptable Respiratory status: acceptable Hydration status: acceptable Visit Vitals /65 Pulse 93 Temp 36.3 °C (97.4 °F) Resp 18 Ht 5' 8\" (1.727 m) Wt 61.2 kg (135 lb) SpO2 97% BMI 20.53 kg/m²

## 2018-12-28 NOTE — PERIOP NOTES
PACU Summary Patient arrived to PACU at [id number] Bedside/Verbal report received from Avery Kirkpatrick CRNA Vitals:  
 12/28/18 1324 12/28/18 1338 12/28/18 1339 12/28/18 1353 BP:  134/63  131/65 Pulse: (!) 101 95 95 93 Resp: 18 18 20 18 Temp:      
SpO2: 98%  100% (!) 86% Weight:      
Height:      
 
Cardiac rhythm: Sinus Tachycardia/NSR Lines and Drains Peripheral Intravenous Line:  
Peripheral IV 12/27/18 Left Forearm (Active) Site Assessment Clean, dry, & intact 12/28/2018  1:13 PM  
Phlebitis Assessment 0 12/28/2018  1:13 PM  
Infiltration Assessment 0 12/28/2018  1:13 PM  
Dressing Status Clean, dry, & intact 12/28/2018  1:13 PM  
Dressing Type Tape;Transparent 12/28/2018  1:13 PM  
Hub Color/Line Status Pink; Infusing 12/28/2018  1:13 PM  
Action Taken Open ports on tubing capped 12/28/2018  7:20 AM  
Alcohol Cap Used Yes 12/28/2018  7:20 AM  
   
Peripheral IV 12/28/18 Right Hand (Active) Site Assessment Clean, dry, & intact 12/28/2018  1:13 PM  
Phlebitis Assessment 0 12/28/2018  1:13 PM  
Infiltration Assessment 0 12/28/2018  1:13 PM  
Dressing Status Clean, dry, & intact 12/28/2018  1:13 PM  
Dressing Type Transparent;Tape 12/28/2018  1:13 PM  
Hub Color/Line Status Green;Capped 12/28/2018  1:13 PM  
 and Arterial Line:  
Arterial Line 12/28/18 Right Radial artery (Active) Site Assessment Clean, dry, & intact 12/28/2018  1:13 PM  
Dressing Status Clean, dry, & intact 12/28/2018  1:13 PM  
Dressing Type Transparent 12/28/2018  1:13 PM  
Line Status Intact and in place 12/28/2018  1:13 PM  
Treatment Zeroed or re-zeroed 12/28/2018  1:13 PM  
Affected Extremity/Extremities Color distal to insertion site pink (or appropriate for race); Pulses palpable;Range of motion performed 12/28/2018  1:13 PM  
 
 
Wound Wound Leg (Active) DRESSING STATUS New drainage 12/28/2018  1:58 PM  
DRESSING TYPE Topical skin adhesive/glue;Transparent film 12/28/2018  1:13 PM  
 Drainage Amount  Moderate 12/28/2018  1:58 PM  
Drainage Color Sanguinous 12/28/2018  1:58 PM  
Wound Odor None 12/28/2018  1:13 PM  
Number of days: 0 Intake and Output Intake/Output Summary (Last 24 hours) at 12/28/2018 1403 Last data filed at 12/28/2018 1346 Gross per 24 hour Intake 1745 ml Output 740 ml Net 1005 ml Report called to Dawit Hale in 2700 at 1400 Patient transported to [id number] at [phone] Chevy Henderson RN

## 2018-12-28 NOTE — PERIOP NOTES
Concerns regarding patient's spouse mental capacity. Spouse unable to recall information regarding location, direction, history, etc. Calls placed to children identified in chart. Escalated concerns to case management. Case management familiar with patient, spouse, and situation. Patient is spouses primary care giver but unable to do so at this time due to medical acuity level. Case management followed up with family and family acknowledges spouse has \"dementia\". This director spoke with family and informed them of the need for care of the visitor to be assumed by someone other then the patient due to safety concerns. Family verbalizes understanding that spouse must be in the supervision of responsible party during visits and cannot board at the hospital with patient. Family numbers located in chart notes.

## 2018-12-28 NOTE — ANESTHESIA PREPROCEDURE EVALUATION
Anesthetic History No history of anesthetic complications Review of Systems / Medical History Patient summary reviewed, nursing notes reviewed and pertinent labs reviewed Pulmonary COPD: moderate Smoker Neuro/Psych Within defined limits Cardiovascular Hypertension: well controlled PAD 
 
 
  
GI/Hepatic/Renal 
Within defined limits Endo/Other Within defined limits Other Findings Physical Exam 
 
Airway Mallampati: II 
TM Distance: 4 - 6 cm Neck ROM: normal range of motion Mouth opening: Normal 
 
 Cardiovascular Regular rate and rhythm,  S1 and S2 normal,  no murmur, click, rub, or gallop Rhythm: regular Rate: normal 
 
 
 
 Dental 
 
Dentition: Edentulous Pulmonary Decreased breath sounds: bilateral 
 
 
 
 
 Abdominal 
GI exam deferred Other Findings Anesthetic Plan ASA: 4 Anesthesia type: general 
 
Monitoring Plan: Arterial line Induction: Intravenous Anesthetic plan and risks discussed with: Patient

## 2018-12-28 NOTE — PROGRESS NOTES
conducted a pre-surgery visit with Larisa Barnes, who is a 74 y. o.,male. The  provided the following Interventions: 
Initiated a relationship of care and support. Offered prayer and assurance of continued prayers on patient's behalf. Plan: 
Chaplains will continue to follow and will provide pastoral care on an as needed/requested basis.  recommends bedside caregivers page  on duty if patient shows signs of acute spiritual or emotional distress Jakub Breaux 3 Board Certified [facility]  
[phone]

## 2018-12-29 LAB
ANION GAP SERPL CALC-SCNC: 7 MMOL/L (ref 3–18)
APTT PPP: 35.4 SEC (ref 23–36.4)
APTT PPP: 75.7 SEC (ref 23–36.4)
APTT PPP: 90.4 SEC (ref 23–36.4)
BASOPHILS # BLD: 0 K/UL (ref 0–0.1)
BASOPHILS # BLD: 0 K/UL (ref 0–0.1)
BASOPHILS NFR BLD: 0 % (ref 0–2)
BASOPHILS NFR BLD: 0 % (ref 0–2)
BUN SERPL-MCNC: 23 MG/DL (ref 7–18)
BUN/CREAT SERPL: 27 (ref 12–20)
CALCIUM SERPL-MCNC: 7.8 MG/DL (ref 8.5–10.1)
CHLORIDE SERPL-SCNC: 104 MMOL/L (ref 100–108)
CO2 SERPL-SCNC: 27 MMOL/L (ref 21–32)
CREAT SERPL-MCNC: 0.85 MG/DL (ref 0.6–1.3)
DIFFERENTIAL METHOD BLD: ABNORMAL
DIFFERENTIAL METHOD BLD: ABNORMAL
EOSINOPHIL # BLD: 0.1 K/UL (ref 0–0.4)
EOSINOPHIL # BLD: 0.1 K/UL (ref 0–0.4)
EOSINOPHIL NFR BLD: 1 % (ref 0–5)
EOSINOPHIL NFR BLD: 1 % (ref 0–5)
ERYTHROCYTE [DISTWIDTH] IN BLOOD BY AUTOMATED COUNT: 14.2 % (ref 11.6–14.5)
ERYTHROCYTE [DISTWIDTH] IN BLOOD BY AUTOMATED COUNT: 14.3 % (ref 11.6–14.5)
GLUCOSE BLD STRIP.AUTO-MCNC: 135 MG/DL (ref 70–110)
GLUCOSE SERPL-MCNC: 118 MG/DL (ref 74–99)
HCT VFR BLD AUTO: 33.8 % (ref 36–48)
HCT VFR BLD AUTO: 34.8 % (ref 36–48)
HGB BLD-MCNC: 10.9 G/DL (ref 13–16)
HGB BLD-MCNC: 11.2 G/DL (ref 13–16)
LYMPHOCYTES # BLD: 0.8 K/UL (ref 0.9–3.6)
LYMPHOCYTES # BLD: 0.8 K/UL (ref 0.9–3.6)
LYMPHOCYTES NFR BLD: 8 % (ref 21–52)
LYMPHOCYTES NFR BLD: 8 % (ref 21–52)
MAGNESIUM SERPL-MCNC: 1.6 MG/DL (ref 1.6–2.6)
MCH RBC QN AUTO: 30.4 PG (ref 24–34)
MCH RBC QN AUTO: 30.4 PG (ref 24–34)
MCHC RBC AUTO-ENTMCNC: 32.2 G/DL (ref 31–37)
MCHC RBC AUTO-ENTMCNC: 32.2 G/DL (ref 31–37)
MCV RBC AUTO: 94.2 FL (ref 74–97)
MCV RBC AUTO: 94.6 FL (ref 74–97)
MONOCYTES # BLD: 0.9 K/UL (ref 0.05–1.2)
MONOCYTES # BLD: 1.1 K/UL (ref 0.05–1.2)
MONOCYTES NFR BLD: 12 % (ref 3–10)
MONOCYTES NFR BLD: 9 % (ref 3–10)
NEUTS SEG # BLD: 7.6 K/UL (ref 1.8–8)
NEUTS SEG # BLD: 7.8 K/UL (ref 1.8–8)
NEUTS SEG NFR BLD: 79 % (ref 40–73)
NEUTS SEG NFR BLD: 82 % (ref 40–73)
PLATELET # BLD AUTO: 196 K/UL (ref 135–420)
PLATELET # BLD AUTO: 202 K/UL (ref 135–420)
PMV BLD AUTO: 11.1 FL (ref 9.2–11.8)
PMV BLD AUTO: 11.2 FL (ref 9.2–11.8)
POTASSIUM SERPL-SCNC: 4.2 MMOL/L (ref 3.5–5.5)
RBC # BLD AUTO: 3.59 M/UL (ref 4.7–5.5)
RBC # BLD AUTO: 3.68 M/UL (ref 4.7–5.5)
SODIUM SERPL-SCNC: 138 MMOL/L (ref 136–145)
WBC # BLD AUTO: 9.5 K/UL (ref 4.6–13.2)
WBC # BLD AUTO: 9.7 K/UL (ref 4.6–13.2)

## 2018-12-29 PROCEDURE — 80048 BASIC METABOLIC PNL TOTAL CA: CPT

## 2018-12-29 PROCEDURE — 74011250637 HC RX REV CODE- 250/637: Performed by: SURGERY

## 2018-12-29 PROCEDURE — 94640 AIRWAY INHALATION TREATMENT: CPT

## 2018-12-29 PROCEDURE — 65270000029 HC RM PRIVATE

## 2018-12-29 PROCEDURE — 74011250637 HC RX REV CODE- 250/637: Performed by: HOSPITALIST

## 2018-12-29 PROCEDURE — 85025 COMPLETE CBC W/AUTO DIFF WBC: CPT

## 2018-12-29 PROCEDURE — 74011250636 HC RX REV CODE- 250/636: Performed by: SURGERY

## 2018-12-29 PROCEDURE — 74011000250 HC RX REV CODE- 250: Performed by: HOSPITALIST

## 2018-12-29 PROCEDURE — 36415 COLL VENOUS BLD VENIPUNCTURE: CPT

## 2018-12-29 PROCEDURE — 77030027138 HC INCENT SPIROMETER -A

## 2018-12-29 PROCEDURE — 74011250636 HC RX REV CODE- 250/636: Performed by: HOSPITALIST

## 2018-12-29 PROCEDURE — 74011250636 HC RX REV CODE- 250/636

## 2018-12-29 PROCEDURE — 85730 THROMBOPLASTIN TIME PARTIAL: CPT

## 2018-12-29 PROCEDURE — 74011250636 HC RX REV CODE- 250/636: Performed by: ANESTHESIOLOGY

## 2018-12-29 PROCEDURE — 83735 ASSAY OF MAGNESIUM: CPT

## 2018-12-29 PROCEDURE — 82962 GLUCOSE BLOOD TEST: CPT

## 2018-12-29 RX ORDER — HEPARIN SODIUM 10000 [USP'U]/100ML
INJECTION, SOLUTION INTRAVENOUS
Status: COMPLETED
Start: 2018-12-29 | End: 2018-12-29

## 2018-12-29 RX ORDER — MAGNESIUM SULFATE HEPTAHYDRATE 40 MG/ML
2 INJECTION, SOLUTION INTRAVENOUS ONCE
Status: COMPLETED | OUTPATIENT
Start: 2018-12-29 | End: 2018-12-29

## 2018-12-29 RX ORDER — HEPARIN SODIUM 10000 [USP'U]/100ML
12-25 INJECTION, SOLUTION INTRAVENOUS
Status: DISCONTINUED | OUTPATIENT
Start: 2018-12-29 | End: 2018-12-30

## 2018-12-29 RX ADMIN — HEPARIN SODIUM AND DEXTROSE 12 UNITS/KG/HR: 10000; 5 INJECTION INTRAVENOUS at 13:17

## 2018-12-29 RX ADMIN — ROSUVASTATIN CALCIUM 40 MG: 20 TABLET, FILM COATED ORAL at 21:40

## 2018-12-29 RX ADMIN — ARFORMOTEROL TARTRATE 15 MCG: 15 SOLUTION RESPIRATORY (INHALATION) at 21:41

## 2018-12-29 RX ADMIN — Medication 10 ML: at 18:16

## 2018-12-29 RX ADMIN — HEPARIN SODIUM AND DEXTROSE 12 UNITS/KG/HR: 10000; 5 INJECTION INTRAVENOUS at 08:57

## 2018-12-29 RX ADMIN — HYDROCODONE BITARTRATE AND ACETAMINOPHEN 1 TABLET: 5; 325 TABLET ORAL at 10:39

## 2018-12-29 RX ADMIN — DEXTROSE MONOHYDRATE, SODIUM CHLORIDE, AND POTASSIUM CHLORIDE 75 ML/HR: 50; 4.5; 1.49 INJECTION, SOLUTION INTRAVENOUS at 03:58

## 2018-12-29 RX ADMIN — HYDROCODONE BITARTRATE AND ACETAMINOPHEN 1 TABLET: 5; 325 TABLET ORAL at 18:14

## 2018-12-29 RX ADMIN — DEXTROSE MONOHYDRATE, SODIUM CHLORIDE, AND POTASSIUM CHLORIDE 75 ML/HR: 50; 4.5; 1.49 INJECTION, SOLUTION INTRAVENOUS at 21:43

## 2018-12-29 RX ADMIN — MAGNESIUM SULFATE HEPTAHYDRATE 2 G: 40 INJECTION, SOLUTION INTRAVENOUS at 15:35

## 2018-12-29 RX ADMIN — FAMOTIDINE 20 MG: 10 INJECTION, SOLUTION INTRAVENOUS at 09:26

## 2018-12-29 RX ADMIN — ASPIRIN 325 MG ORAL TABLET 325 MG: 325 PILL ORAL at 09:24

## 2018-12-29 RX ADMIN — BUDESONIDE 500 MCG: 0.5 INHALANT RESPIRATORY (INHALATION) at 21:41

## 2018-12-29 RX ADMIN — Medication 10 ML: at 05:18

## 2018-12-29 RX ADMIN — HYDROMORPHONE HYDROCHLORIDE 1 MG: 1 INJECTION, SOLUTION INTRAMUSCULAR; INTRAVENOUS; SUBCUTANEOUS at 05:17

## 2018-12-29 RX ADMIN — ARFORMOTEROL TARTRATE 15 MCG: 15 SOLUTION RESPIRATORY (INHALATION) at 07:58

## 2018-12-29 RX ADMIN — HEPARIN SODIUM 5000 UNITS: 5000 INJECTION INTRAVENOUS; SUBCUTANEOUS at 06:49

## 2018-12-29 RX ADMIN — FAMOTIDINE 20 MG: 10 INJECTION, SOLUTION INTRAVENOUS at 21:43

## 2018-12-29 RX ADMIN — HYDROMORPHONE HYDROCHLORIDE 1 MG: 1 INJECTION, SOLUTION INTRAMUSCULAR; INTRAVENOUS; SUBCUTANEOUS at 00:50

## 2018-12-29 RX ADMIN — TEMAZEPAM 30 MG: 15 CAPSULE ORAL at 21:39

## 2018-12-29 RX ADMIN — HYDROCHLOROTHIAZIDE 25 MG: 25 TABLET ORAL at 09:25

## 2018-12-29 RX ADMIN — LOSARTAN POTASSIUM 100 MG: 50 TABLET ORAL at 10:39

## 2018-12-29 RX ADMIN — BUDESONIDE 500 MCG: 0.5 INHALANT RESPIRATORY (INHALATION) at 07:58

## 2018-12-29 NOTE — PROGRESS NOTES
0730 Report received from off going RN at the bedside, inclusive of drains, lines and current medications, skin condition showing unstageable skin ulcer on posterior sacrum patient admitted with and covered with mepalex at this time. Patient with no audible dopplers to calf on operative foot, Dr Sarah Beth Finn present at assessment and aware. MD aware patient on subQ heparin at this time, order being placed to start IV heparin, awaiting input of orders. Patient with good current pain control. Dressing to surgical site removed while MD present on his orders, oozing present to all wound. No orders rto redress. , Area cleaned and open to air. Area on buttock 1 inch away from rectal tissue shows pilonidal cyst with white tissue in the bottom of wound, scant drainage serous. Covered with mepalex. 0920 A-line dc'd at this time, site pressure held for 15 minutes, the lab drawn for ptt and cbc sent to lab, then heparin drip started at lowest ordered level. 1000 Patient with complaint of pain, medicated from pain level of 4 to level 1. 
 
1200 TRANSFER - OUT REPORT: 
 
Verbal report given to RN(name) on Claudia Kyle  being transferred to (unit) for 2219 Report consisted of patients Situation, Background, Assessment and  
Recommendations(SBAR). Information from the following report(s) SBAR, Kardex, Procedure Summary, Intake/Output, MAR, Recent Results and Quality Measures was reviewed with the receiving nurse. Lines:  
Peripheral IV 12/27/18 Left Forearm (Active) Site Assessment Clean, dry, & intact 12/29/2018  8:00 AM  
Phlebitis Assessment 0 12/29/2018  8:00 AM  
Infiltration Assessment 0 12/29/2018  8:00 AM  
Dressing Status Clean, dry, & intact 12/29/2018  8:00 AM  
Dressing Type Transparent 12/29/2018  8:00 AM  
Hub Color/Line Status Pink 12/29/2018  8:00 AM  
Action Taken Open ports on tubing capped 12/29/2018  8:00 AM  
Alcohol Cap Used Yes 12/29/2018  8:00 AM  
   
 Peripheral IV 12/28/18 Right Hand (Active) Site Assessment Clean, dry, & intact 12/29/2018  8:00 AM  
Phlebitis Assessment 0 12/29/2018  8:00 AM  
Infiltration Assessment 0 12/29/2018  8:00 AM  
Dressing Status Clean, dry, & intact 12/29/2018  8:00 AM  
Dressing Type Transparent 12/29/2018  8:00 AM  
Hub Color/Line Status Green 12/29/2018  8:00 AM  
Action Taken Open ports on tubing capped 12/29/2018  8:00 AM  
Alcohol Cap Used Yes 12/29/2018  8:00 AM  
   
Arterial Line 12/28/18 Right Radial artery (Active) 12/29/2018  8:00 AM  
  12/29/2018  8:00 AM  
  
 
Opportunity for questions and clarification was provided. Patient transported with: 
 Registered Nurse

## 2018-12-29 NOTE — PROGRESS NOTES
Internal Medicine Progress Note Patient's Name: Joshua William Admit Date: 12/26/2018 Length of Stay: 3 Assessment/Plan Active Hospital Problems Diagnosis Date Noted  Status post arterial stent 12/26/2018  High cholesterol 12/26/2018  Hypertension 12/26/2018  Peripheral arterial disease ([facility] 12/26/2018  COPD (chronic obstructive pulmonary disease) ([facility] 12/26/2018  Insomnia 12/26/2018 - Diet and mobilization per primary team 
- Pain control PRN 
- PT/OT 
- Cont heparin gtt - BP in approp range - F/u vasc surg - Cont acceptable home medications for chronic conditions  
- DVT protocol I have personally reviewed all pertinent labs and films that have officially resulted over the last 24 hours. I have personally checked for all pending labs that are awaiting final results. Subjective Pt s/e @ bedside No major events overnight S/p fem pop w/ GSV 
C/o pain and decreased sensation RLE Denies CP or SOB Objective Visit Vitals /41 (BP 1 Location: Left arm, BP Patient Position: At rest) Pulse 76 Temp 98.8 °F (37.1 °C) Resp 18 Ht 5' 8\" (1.727 m) Wt 61.2 kg (134 lb 14.7 oz) SpO2 96% BMI 20.51 kg/m² Physical Exam: 
General Appearance: NAD, conversant Lungs: CTA with normal respiratory effort CV: RRR, no m/r/g Abdomen: soft, non-tender, normal bowel sounds Extremities: RLE wound C/D/I, purplish color throughout ext, decreased sensation compared with opposite ext Neuro: No focal deficits, motor/sensory intact Lab/Data Reviewed: 
BMP:  
Lab Results Component Value Date/Time  12/29/2018 01:15 AM  
 K 4.2 12/29/2018 01:15 AM  
  12/29/2018 01:15 AM  
 CO2 27 12/29/2018 01:15 AM  
 AGAP 7 12/29/2018 01:15 AM  
  (H) 12/29/2018 01:15 AM  
 BUN 23 (H) 12/29/2018 01:15 AM  
 CREA 0.85 12/29/2018 01:15 AM  
 GFRAA >60 12/29/2018 01:15 AM  
 GFRNA >60 12/29/2018 01:15 AM  
 
CBC:  
Lab Results Component Value Date/Time WBC 9.7 12/29/2018 08:43 AM  
 HGB 11.2 (L) 12/29/2018 08:43 AM  
 HCT 34.8 (L) 12/29/2018 08:43 AM  
  12/29/2018 08:43 AM  
 
 
Imaging Reviewed: 
No results found. Medications Reviewed: 
Current Facility-Administered Medications Medication Dose Route Frequency  heparin 25,000 units in D5W 250 ml infusion  12-25 Units/kg/hr IntraVENous TITRATE  magnesium sulfate 2 g/50 ml IVPB (premix or compounded)  2 g IntraVENous ONCE  
 0.9% sodium chloride infusion 250 mL  250 mL IntraVENous PRN  
 lactated Ringers infusion  50 mL/hr IntraVENous CONTINUOUS  
 famotidine (PF) (PEPCID) 20 mg injection  20 mg IntraVENous Q12H  
 sodium chloride (NS) flush 5-10 mL  5-10 mL IntraVENous Q8H  
 sodium chloride (NS) flush 5-10 mL  5-10 mL IntraVENous PRN  
 dextrose 5% - 0.45% NaCl with KCl 20 mEq/L infusion  75 mL/hr IntraVENous CONTINUOUS  
 acetaminophen (TYLENOL) tablet 650 mg  650 mg Oral Q4H PRN  
 HYDROcodone-acetaminophen (NORCO) 5-325 mg per tablet 1 Tab  1 Tab Oral Q4H PRN  
 HYDROmorphone (PF) (DILAUDID) injection 1 mg  1 mg IntraVENous Q4H PRN  
 naloxone (NARCAN) injection 0.4 mg  0.4 mg IntraVENous PRN  
 ondansetron (ZOFRAN) injection 4 mg  4 mg IntraVENous Q4H PRN  
 aspirin tablet 325 mg  325 mg Oral DAILY  diazePAM (VALIUM) tablet 5 mg  5 mg Oral Q8H PRN  
 escitalopram oxalate (LEXAPRO) tablet 5 mg  5 mg Oral DAILY  rosuvastatin (CRESTOR) tablet 40 mg  40 mg Oral QHS  temazepam (RESTORIL) capsule 30 mg  30 mg Oral QHS PRN  
 losartan (COZAAR) tablet 100 mg  100 mg Oral DAILY  hydroCHLOROthiazide (HYDRODIURIL) tablet 25 mg  25 mg Oral DAILY  budesonide (PULMICORT) 500 mcg/2 ml nebulizer suspension  500 mcg Nebulization BID RT  
 arformoterol (BROVANA) neb solution 15 mcg  15 mcg Nebulization BID RT Niya Del Valle DO Internal Medicine, Hospitalist 
Pager: [phone]

## 2018-12-29 NOTE — PROGRESS NOTES
Problem: Falls - Risk of 
Goal: *Absence of Falls Document Norma Smith Fall Risk and appropriate interventions in the flowsheet. Outcome: Progressing Towards Goal 
Fall Risk Interventions: 
Mobility Interventions: Bed/chair exit alarm Medication Interventions: Assess postural VS orthostatic hypotension Elimination Interventions: Call light in reach, Bed/chair exit alarm

## 2018-12-29 NOTE — PROGRESS NOTES
1215 - TRANSFER - IN REPORT: 
 
Verbal report received from Maranda Barron RN(name) on Joshua William  being received from ICU(unit) for routine progression of care Report consisted of patients Situation, Background, Assessment and  
Recommendations(SBAR). Information from the following report(s) SBAR, Kardex and MAR was reviewed with the receiving nurse. Opportunity for questions and clarification was provided. Assessment will be completed upon patients arrival to unit and care assumed. 1409 - Assessment completed. Pt is AOx4. VSS. IV infusing. Dressing has small drainage. Ramirez in place and draining. Mepilex to sacrum d/t recurring cyst. Pt supplied with an ICS d/t low grade temp. Call bell at bedside. Family at bedside. 1540 - PTT reviewed, no change to heparin drip d/t 90.4 value. 1815 - observed pt resting in bed, family at bedside. No distress noted.

## 2018-12-29 NOTE — PROGRESS NOTES
[facility] 
[address] [place], [address] [facility] [address]. Greg Hernández, Dr. Ramon Weston, Dr. Gustavo Hooks, & Dr. Denny Rueda [phone] FAX# [phone] Progress Note Patient: Joseluis Naik MRN: [id number]  SSN: [id number] YOB: 1945  Age: 74 y.o. Sex: male Admit Date: 12/26/2018 LOS: 3 days Subjective: POD 1 from RLE fem pop with GSV. Symptoms improved. Moderate RLE pain. Objective:  
 
Vitals:  
 12/29/18 0200 12/29/18 0300 12/29/18 0400 12/29/18 0758 BP: 111/47 101/47 110/44 Pulse: 73 74 80 Resp: 14 18 22 Temp:   98.7 °F (37.1 °C) SpO2: 100% 99% 95% 97% Weight:      
Height:      
  
 
Intake and Output: 
Current Shift: No intake/output data recorded. Last three shifts: 12/27 1901 - 12/29 0700 In: 2920 [P.O.:120; I.V.:2800] Out: 2110 [Urine:2010] Physical Exam:  
GENERAL: alert, cooperative, no distress, appears stated age THROAT & NECK: normal and no erythema or exudates noted. LUNG: diminished breath sounds R base, L base HEART: regular rate and rhythm, S1, S2 normal, no murmur, click, rub or gallop ABDOMEN: soft, non-tender. Bowel sounds normal. No masses,  no organomegaly EXTREMITIES:  RLE wound c/d/i. Doppler bypass pulse. Soft DP/PT signals. Motor 5/5. Decrease cap refill. NEUROLOGIC: AOx3. Gait normal. Reflexes and motor strength normal and symmetric. Cranial nerves 2-12 and sensation grossly intact. Lab/Data Review: 
BMP:  
Lab Results Component Value Date/Time  12/29/2018 01:15 AM  
 K 4.2 12/29/2018 01:15 AM  
  12/29/2018 01:15 AM  
 CO2 27 12/29/2018 01:15 AM  
 AGAP 7 12/29/2018 01:15 AM  
  (H) 12/29/2018 01:15 AM  
 BUN 23 (H) 12/29/2018 01:15 AM  
 CREA 0.85 12/29/2018 01:15 AM  
 GFRAA >60 12/29/2018 01:15 AM  
 GFRNA >60 12/29/2018 01:15 AM  
 
CBC:  
Lab Results Component Value Date/Time  WBC 9.5 12/29/2018 01:15 AM  
 HGB 10.9 (L) 12/29/2018 01:15 AM  
 HCT 33.8 (L) 12/29/2018 01:15 AM  
  12/29/2018 01:15 AM  
  
 
 
 
Assessment:  
 
Principal Problem: 
  Status post arterial stent (12/26/2018) Active Problems: 
  High cholesterol (12/26/2018) Hypertension (12/26/2018) [facility] (12/26/2018) COPD (chronic obstructive pulmonary disease) ([facility] (12/26/2018) Insomnia (12/26/2018) Plan:  
 
Heparin drip OOB to chair D/C narciso. PT/OT. Signed By: Adam Jean MD   
 December 29, 2018

## 2018-12-30 LAB
APTT PPP: 57.2 SEC (ref 23–36.4)
APTT PPP: 78.1 SEC (ref 23–36.4)
BASOPHILS # BLD: 0 K/UL (ref 0–0.1)
BASOPHILS # BLD: 0 K/UL (ref 0–0.1)
BASOPHILS NFR BLD: 0 % (ref 0–2)
BASOPHILS NFR BLD: 0 % (ref 0–2)
DIFFERENTIAL METHOD BLD: ABNORMAL
DIFFERENTIAL METHOD BLD: ABNORMAL
EOSINOPHIL # BLD: 0 K/UL (ref 0–0.4)
EOSINOPHIL # BLD: 0 K/UL (ref 0–0.4)
EOSINOPHIL NFR BLD: 0 % (ref 0–5)
EOSINOPHIL NFR BLD: 0 % (ref 0–5)
ERYTHROCYTE [DISTWIDTH] IN BLOOD BY AUTOMATED COUNT: 14 % (ref 11.6–14.5)
ERYTHROCYTE [DISTWIDTH] IN BLOOD BY AUTOMATED COUNT: 14 % (ref 11.6–14.5)
HCT VFR BLD AUTO: 31.1 % (ref 36–48)
HCT VFR BLD AUTO: 33.2 % (ref 36–48)
HGB BLD-MCNC: 10.1 G/DL (ref 13–16)
HGB BLD-MCNC: 10.5 G/DL (ref 13–16)
LYMPHOCYTES # BLD: 0.5 K/UL (ref 0.9–3.6)
LYMPHOCYTES # BLD: 0.7 K/UL (ref 0.9–3.6)
LYMPHOCYTES NFR BLD: 5 % (ref 21–52)
LYMPHOCYTES NFR BLD: 7 % (ref 21–52)
MCH RBC QN AUTO: 29.6 PG (ref 24–34)
MCH RBC QN AUTO: 30.4 PG (ref 24–34)
MCHC RBC AUTO-ENTMCNC: 31.6 G/DL (ref 31–37)
MCHC RBC AUTO-ENTMCNC: 32.5 G/DL (ref 31–37)
MCV RBC AUTO: 93.5 FL (ref 74–97)
MCV RBC AUTO: 93.7 FL (ref 74–97)
MONOCYTES # BLD: 1.1 K/UL (ref 0.05–1.2)
MONOCYTES # BLD: 1.1 K/UL (ref 0.05–1.2)
MONOCYTES NFR BLD: 11 % (ref 3–10)
MONOCYTES NFR BLD: 11 % (ref 3–10)
NEUTS SEG # BLD: 8.1 K/UL (ref 1.8–8)
NEUTS SEG # BLD: 8.4 K/UL (ref 1.8–8)
NEUTS SEG NFR BLD: 82 % (ref 40–73)
NEUTS SEG NFR BLD: 84 % (ref 40–73)
PLATELET # BLD AUTO: 171 K/UL (ref 135–420)
PLATELET # BLD AUTO: 183 K/UL (ref 135–420)
PMV BLD AUTO: 11 FL (ref 9.2–11.8)
PMV BLD AUTO: 11.2 FL (ref 9.2–11.8)
RBC # BLD AUTO: 3.32 M/UL (ref 4.7–5.5)
RBC # BLD AUTO: 3.55 M/UL (ref 4.7–5.5)
WBC # BLD AUTO: 10.1 K/UL (ref 4.6–13.2)
WBC # BLD AUTO: 9.9 K/UL (ref 4.6–13.2)

## 2018-12-30 PROCEDURE — 97162 PT EVAL MOD COMPLEX 30 MIN: CPT

## 2018-12-30 PROCEDURE — 85730 THROMBOPLASTIN TIME PARTIAL: CPT

## 2018-12-30 PROCEDURE — 74011250637 HC RX REV CODE- 250/637: Performed by: SURGERY

## 2018-12-30 PROCEDURE — 65270000029 HC RM PRIVATE

## 2018-12-30 PROCEDURE — 74011250636 HC RX REV CODE- 250/636: Performed by: SURGERY

## 2018-12-30 PROCEDURE — 74011250637 HC RX REV CODE- 250/637: Performed by: HOSPITALIST

## 2018-12-30 PROCEDURE — 94640 AIRWAY INHALATION TREATMENT: CPT

## 2018-12-30 PROCEDURE — 74011000250 HC RX REV CODE- 250: Performed by: HOSPITALIST

## 2018-12-30 PROCEDURE — 74011250636 HC RX REV CODE- 250/636: Performed by: INTERNAL MEDICINE

## 2018-12-30 PROCEDURE — 36415 COLL VENOUS BLD VENIPUNCTURE: CPT

## 2018-12-30 PROCEDURE — 85025 COMPLETE CBC W/AUTO DIFF WBC: CPT

## 2018-12-30 PROCEDURE — 74011250636 HC RX REV CODE- 250/636: Performed by: ANESTHESIOLOGY

## 2018-12-30 PROCEDURE — 97530 THERAPEUTIC ACTIVITIES: CPT

## 2018-12-30 RX ORDER — ZOLPIDEM TARTRATE 5 MG/1
5 TABLET ORAL
Status: DISCONTINUED | OUTPATIENT
Start: 2018-12-30 | End: 2019-01-04 | Stop reason: HOSPADM

## 2018-12-30 RX ORDER — HEPARIN SODIUM 10000 [USP'U]/100ML
750 INJECTION, SOLUTION INTRAVENOUS CONTINUOUS
Status: DISCONTINUED | OUTPATIENT
Start: 2018-12-30 | End: 2018-12-31

## 2018-12-30 RX ORDER — HYDROMORPHONE HYDROCHLORIDE 1 MG/ML
.5-1 INJECTION, SOLUTION INTRAMUSCULAR; INTRAVENOUS; SUBCUTANEOUS
Status: DISCONTINUED | OUTPATIENT
Start: 2018-12-30 | End: 2019-01-02

## 2018-12-30 RX ORDER — GUAIFENESIN 100 MG/5ML
81 LIQUID (ML) ORAL DAILY
Status: DISCONTINUED | OUTPATIENT
Start: 2018-12-31 | End: 2019-01-04 | Stop reason: HOSPADM

## 2018-12-30 RX ORDER — OXYCODONE AND ACETAMINOPHEN 5; 325 MG/1; MG/1
1-2 TABLET ORAL
Status: DISCONTINUED | OUTPATIENT
Start: 2018-12-30 | End: 2019-01-02

## 2018-12-30 RX ORDER — LANOLIN ALCOHOL/MO/W.PET/CERES
1.5 CREAM (GRAM) TOPICAL
Status: DISCONTINUED | OUTPATIENT
Start: 2018-12-30 | End: 2019-01-04 | Stop reason: HOSPADM

## 2018-12-30 RX ORDER — BISACODYL 5 MG
10 TABLET, DELAYED RELEASE (ENTERIC COATED) ORAL
Status: COMPLETED | OUTPATIENT
Start: 2018-12-30 | End: 2018-12-30

## 2018-12-30 RX ORDER — HALOPERIDOL 5 MG/ML
2 INJECTION INTRAMUSCULAR
Status: DISCONTINUED | OUTPATIENT
Start: 2018-12-30 | End: 2019-01-04 | Stop reason: HOSPADM

## 2018-12-30 RX ADMIN — HYDROMORPHONE HYDROCHLORIDE 1 MG: 1 INJECTION, SOLUTION INTRAMUSCULAR; INTRAVENOUS; SUBCUTANEOUS at 10:20

## 2018-12-30 RX ADMIN — HALOPERIDOL LACTATE 2 MG: 5 INJECTION, SOLUTION INTRAMUSCULAR at 03:24

## 2018-12-30 RX ADMIN — HEPARIN SODIUM AND DEXTROSE 750 UNITS/HR: 10000; 5 INJECTION INTRAVENOUS at 15:58

## 2018-12-30 RX ADMIN — ARFORMOTEROL TARTRATE 15 MCG: 15 SOLUTION RESPIRATORY (INHALATION) at 08:12

## 2018-12-30 RX ADMIN — ESCITALOPRAM OXALATE 5 MG: 10 TABLET ORAL at 10:27

## 2018-12-30 RX ADMIN — Medication 10 ML: at 00:47

## 2018-12-30 RX ADMIN — Medication 10 ML: at 10:32

## 2018-12-30 RX ADMIN — BISACODYL 10 MG: 5 TABLET, COATED ORAL at 10:27

## 2018-12-30 RX ADMIN — HALOPERIDOL LACTATE 2 MG: 5 INJECTION, SOLUTION INTRAMUSCULAR at 00:47

## 2018-12-30 RX ADMIN — HYDROMORPHONE HYDROCHLORIDE 1 MG: 1 INJECTION, SOLUTION INTRAMUSCULAR; INTRAVENOUS; SUBCUTANEOUS at 18:52

## 2018-12-30 RX ADMIN — ARFORMOTEROL TARTRATE 15 MCG: 15 SOLUTION RESPIRATORY (INHALATION) at 21:00

## 2018-12-30 RX ADMIN — FAMOTIDINE 20 MG: 10 INJECTION, SOLUTION INTRAVENOUS at 21:00

## 2018-12-30 RX ADMIN — Medication 10 ML: at 18:53

## 2018-12-30 RX ADMIN — ASPIRIN 325 MG ORAL TABLET 325 MG: 325 PILL ORAL at 10:26

## 2018-12-30 RX ADMIN — DEXTROSE MONOHYDRATE, SODIUM CHLORIDE, AND POTASSIUM CHLORIDE 25 ML/HR: 50; 4.5; 1.49 INJECTION, SOLUTION INTRAVENOUS at 14:00

## 2018-12-30 RX ADMIN — FAMOTIDINE 20 MG: 10 INJECTION, SOLUTION INTRAVENOUS at 10:27

## 2018-12-30 RX ADMIN — LOSARTAN POTASSIUM 100 MG: 50 TABLET ORAL at 10:28

## 2018-12-30 RX ADMIN — BUDESONIDE 500 MCG: 0.5 INHALANT RESPIRATORY (INHALATION) at 21:00

## 2018-12-30 RX ADMIN — HYDROCHLOROTHIAZIDE 25 MG: 25 TABLET ORAL at 09:00

## 2018-12-30 RX ADMIN — BUDESONIDE 500 MCG: 0.5 INHALANT RESPIRATORY (INHALATION) at 08:12

## 2018-12-30 NOTE — PROGRESS NOTES
Problem: Mobility Impaired (Adult and Pediatric) Goal: *Acute Goals and Plan of Care (Insert Text) Physical Therapy Goals Initiated 12/30/2018 and to be accomplished within 7 day(s) 1. Patient will move from supine to sit and sit to supine , scoot up and down and roll side to side in bed with minimal assistance/contact guard assist.    
2.  Patient will transfer from bed to chair and chair to bed with minimal assistance/contact guard assist using the least restrictive device. 3.  Patient will perform sit to stand with minimal assistance/contact guard assist. 
4.  Patient will ambulate with minimal assistance/contact guard assist for >/= 75 feet with the least restrictive device. 5.  Patient will demonstrate independence with performance of home exercise program. 
  
 
physical Therapy EVALUATION Patient: Alia Arce (10 y.o. male) Date: 12/30/2018 Primary Diagnosis: Status post arterial stent Procedure(s) (LRB): FEMORAL-POPLITEAL BYPASS (Right) 2 Days Post-Op Precautions:   
 PLOF: Lives with wife with dementia, Ambulatory with no assistive device. ASSESSMENT : 
 Patient requires between maximal assistance and total assistance of 2 for bed mobility. Unable to assess transfers and ambulation at this time. Initially required max/total assist to maintain static sitting balance sitting edge of bed holding onto rolling walker for balance/support as patient very anxious. With verbal and tactile cues and encouragement of son patient able to maintain static sitting balance with CGA holding onto rolling walker. Will try to pre-med prior to next treatment session for better participation with therapy. Will benefit from skilled PT intervention to increase overall functional mobility independence and safety post-op. Son from [place] and daughter from [place] present during treatment.  Son somewhat anxious and very concern re how his father is going to be able to go home considering mother has dementia. Explained to son/daughter PT will recommend SNF placement and explained to them difference between IPR, SNF, HH and OP therapy. Son more at ease after explaining above. Also informed nurse Lloyd Fuentes children would like to speak with . Patient presents with deficits in:  
Bed Mobility, Transfers, Gait, Strength, Range of Motion, Balance and Home Exercise Program 
 
Patient will benefit from skilled intervention to address the above impairments. Patients rehabilitation potential is considered to be Good Factors which may influence rehabilitation potential include:  
[]         None noted 
[]         Mental ability/status [x]         Medical condition 
[]         Home/family situation and support systems 
[]         Safety awareness [x]         Pain tolerance/management 
[]         Other:  
 
Recommendations for nursing:  
Written on communication board: Sit Edge of bed with assist of 2 Verbally communicated to: nurse Lloyd Fuentes PLAN : 
Recommendations and Planned Interventions:* 
[x]           Bed Mobility Training             [x]    Neuromuscular Re-Education 
[x]           Transfer Training                   []    Orthotic/Prosthetic Training 
[x]           Gait Training                          []    Modalities [x]           Therapeutic Exercises          []    Edema Management/Control 
[]           Therapeutic Activities            [x]    Patient and Family Training/Education* [x]           Other (comment): Plan of care, safety, bed mobility, sitting balance/tolerance, difference between OP, HH, SNF and IPR Frequency/Duration: Patient will be followed by physical therapy 1 time per day/4-7 days per week to address goals. Discharge Recommendations: Matt Connolly Further Equipment Recommendations for Discharge: rolling walker SUBJECTIVE:  
Patient stated I'll take my pain medication after therapy.  OBJECTIVE DATA SUMMARY:  
 
Past Medical History:  
Diagnosis Date  High cholesterol  Hypertension Past Surgical History:  
Procedure Laterality Date  HX APPENDECTOMY  HX MOHS PROCEDURES Left  HX TONSIL AND ADENOIDECTOMY Barriers to Learning/Limitations: yes;  none Compensate with: visual, verbal, tactile, kinesthetic cues/model G CODE:Mobility [id number] Current  CN= 100%   Goal  CI= 1-19%. The severity rating is based on the Other [address] Sitting Balance Scale Eval Complexity: History: MEDIUM  Complexity : 1-2 comorbidities / personal factors will impact the outcome/ POC Exam:MEDIUM Complexity : 3 Standardized tests and measures addressing body structure, function, activity limitation and / or participation in recreation  Presentation: MEDIUM Complexity : Evolving with changing characteristics  Clinical Decision Making:Medium Complexity [facility] Sitting Balance Scale Overall Complexity:MEDIUM [address] Sitting Balance Scale 
0: Pt performs 25% or less of sitting activity (Max assist) CN, 100% impaired. 1: Pt supports self with upper extremities but requires therapist assistance. Pt performs 25-50% of effort (Mod assist) CM, 80% to <100% impaired. 1+: Pt supports self with upper extremities but requires therapist assistance. Pt performs >50% effort. (Min assist). CL, 60% to <80% impaired. 2: Pt supports self independently with both upper extremities. CL, 60% to <80% impaired. 2+: Pt support self independently with 1 upper extremity. CK, 40% to <60% impaired. 3: Pt sits without upper extremity support for up to 30 seconds. CK, 40% to <60% impaired. 3+: Pt sits without upper extremity support for 30 seconds or greater. CJ, 20% to <40% impaired. 4: Pt moves and returns trunkal midpoint 1-2 inches in one plane. CJ, 20% to <40% impaired. 4+: Pt moves and returns trunkal midpoint 1-2 inches in multiple planes. CI, 1% to <20% impaired. 5: Pt moves and returns trunkal midpoint in all planes greater than 2 inches. CH, 0% impaired. Prior Level of Function/Home Situation: Lives with wife with dementia, Ambulatory with no assistive device. Home Situation Home Environment: Private residence One/Two Story Residence: Two story, live on 1st floor Living Alone: No 
Support Systems: Child(tung), Family member(s) Patient Expects to be Discharged to[de-identified] Private residence Current DME Used/Available at Home: Cane, straight, Shower chair, WheelchairCritical Behavior: 
Neurologic State: Alert Orientation Level: Oriented X4 Cognition: Follows commands Safety/Judgement: Awareness of environment; Fall prevention Psychosocial 
Patient Behaviors: Anxious; Cooperative Family  Behaviors: Supportive; Other (comment)(over-protective some) Purposeful Interaction: Yes Pt Identified Daily Priority: Clinical issues (comment) Caritas Process: Nurture loving kindness;Enable jagjit/hope;Establish trust;Nurture spiritual self;Teaching/learning; Attend basic human needs;Create healing environment Caring Interventions: Therapeutic modalities Reassure: Caring rounds Therapeutic Modalities: Intentional therapeutic touchFamily  Behaviors: Supportive; Other (comment)(over-protective some) Strength:   
Strength: (both LE unable to assess with accuracy secondary to c/o pain) Tone & Sensation:  
Tone: Normal(both LE) Range Of Motion: 
AROM: Grossly decreased, non-functional(both LE secondary to c/o pain with movement) PROM: Generally decreased, functional(both LE secondary to c/o pain with movement) Functional Mobility: 
Bed Mobility: 
Supine to Sit: Maximum assistance; Total assistance; Additional time;Assist x2 Sit to Supine: Maximum assistance; Total assistance; Additional time;Assist x2 Scooting: Total assistance;Assist x2 Transfers: 
Sit to Stand: ( Not tested at this time, patient unable) Balance:  
Sitting: With support Sitting - Static: Poor (constant support)(Minus initially, Fair Minus at best) Sitting - Dynamic: (Unable to assess at this time)Ambulation/Gait Training: 
Gait Description (WDL): (Unable to assess at this time) Pain: 
Pre treatment pain level: 8, right legPost treatment pain level: 9, right leg (nurse Elisabeth in room to give patient pain medication) Pain Scale 1: Numeric (0 - 10) Pain Intensity 1: 10 
Pain Location 1: Leg 
Pain Orientation 1: Right Pain Description 1: Aching Pain Intervention(s) 1: Medication (see MAR) Activity Tolerance: 
Poor + Please refer to the flowsheet for vital signs taken during this treatment. After treatment: 
[]         Patient left in no apparent distress sitting up in chair 
[x]         Patient left in no apparent distress in bed 
[x]         Call bell left within reach [x]         Nursing notified 
[x]         Children present 
[]         Bed alarm activated COMMUNICATION/EDUCATION:  
[x]         Fall prevention education was provided and the patient/caregiver indicated understanding. [x]         Patient/family have participated as able in goal setting and plan of care. [x]         Patient/family agree to work toward stated goals and plan of care. []         Patient understands intent and goals of therapy, but is neutral about his/her participation. []         Patient is unable to participate in goal setting and plan of care. Thank you for this referral. 
Goyo Murrell, PT Time Calculation: 54 mins

## 2018-12-30 NOTE — PROGRESS NOTES
Nutrition initial assessment/Plan of care RECOMMENDATIONS:  
1. Dental Soft Solid Diet per preference 2. Monitor labs, weight and PO intake 3. RD to follow GOALS:  
1. PO intake meets >75% of protein/calorie needs by 1/4 
2. Weight Maintenance/gradual gain (1-2 lb/week) by 1/6    
ASSESSMENT:  
Wt status is classified as normal per BMI of 20.5. However, Pt at nutrition risk d/t BMI <23 and age >65 years. Variable PO intake per vitals. Labs noted. Pt w/ elevated BUN and hypocalcemia. Nutrition recommendations listed. RD to follow. Nutrition Diagnoses:  
Increased nutrient needs related to wound healing as evidenced by an unstageable pressure injury to gluteal fold documented by nursing. Nutrition Risk:  [] High  [x] Moderate []  Low SUBJECTIVE/OBJECTIVE:  
 Pt admitted s/p arterial stent. Transferred from ICU to [facility] yesterday afternoon. Noted unstageable pressure injury to gluteal fold documented by nursing. MST received for 24-33 lb weight loss, but per documented weight records Pt was 139 lb on (3/15/2018). Pt seen in room with family at bedside. Denies having nay food allergies, prefers softer texture foods and stable weight PTA. Noted weight given by family does not match current weight on record. Reports having a good appetite and consumes 3 meals per day at home. Declined nutritional supplements. Discussed importance of a viable source of protein at each meal to promote wound healing. Encouraged intake and will monitor. Information Obtained from:  
 [x] Chart Review [x] Patient [x] Family/Caregiver 
 [] Nurse/Physician 
 [] Interdisciplinary Meeting/Rounds [facility] Medications: [x] Reviewed Allergies: [x] Reviewed Encounter Diagnoses ICD-10-CM ICD-9-CM 1. Status post arterial stent Z95.9 V45.89 2. PVD (peripheral vascular disease) ([facility]) I73.9 443.9 Past Medical History:  
Diagnosis Date  High cholesterol  Hypertension Labs: Lab Results Component Value Date/Time Sodium 138 12/29/2018 01:15 AM  
 Potassium 4.2 12/29/2018 01:15 AM  
 Chloride 104 12/29/2018 01:15 AM  
 CO2 27 12/29/2018 01:15 AM  
 Anion gap 7 12/29/2018 01:15 AM  
 Glucose 118 (H) 12/29/2018 01:15 AM  
 BUN 23 (H) 12/29/2018 01:15 AM  
 Creatinine 0.85 12/29/2018 01:15 AM  
 Calcium 7.8 (L) 12/29/2018 01:15 AM  
 Magnesium 1.6 12/29/2018 01:15 AM  
 Albumin 3.6 12/26/2018 02:05 PM  
 
Anthropometrics: BMI (calculated): 20.5 Last 3 Recorded Weights in this Encounter 12/26/18 1340 12/29/18 0800 Weight: 61.2 kg (135 lb) 61.2 kg (134 lb 14.7 oz) Ht Readings from Last 1 Encounters:  
12/29/18 5' 8\" (1.727 m) Weight Metrics 12/29/2018 5/19/2015 Weight 134 lb 14.7 oz 158 lb BMI 20.51 kg/m2 24.03 kg/m2 Patient Vitals for the past 100 hrs: 
 % Diet Eaten 12/29/18 2022 0 % 12/29/18 0800 35 % 12/27/18 1841 75 % 12/27/18 0741 0 % IBW: 154 lb %IBW: 87%  
[] Weight Loss [] Weight Gain [x] Weight Stable per Pt and family Estimated Nutrition Needs: [x] MSJ  [] Other: 
Calories: 8174-6607 kcal Based on:   [x] Actual BW   
Protein:   73-80 g Based on:   [x] Actual BW Fluid:       0692-6558 ml Based on:   [x] Actual BW  
 
 [x] No Cultural, Nondenominational or ethnic dietary need identified. [] Cultural, Nondenominational and ethnic food preferences identified and addressed Wt Status:  [x] Normal (18.6 - 24.9) [] Underweight (<18.5) [] Overweight (25 - 29.9) [] Mild Obesity (30 - 34.9)  [] Moderate Obesity (35 - 39.9) [] Morbid Obesity (40+) Nutrition Problems Identified:  
[] Suboptimal PO intake  
[] Food Allergies [] Difficulty chewing/swallowing/poor dentition 
[] Constipation/Diarrhea  
[] Nausea/Vomiting  
[] None 
[x] Other: Wound healing Plan:  
[x] Therapeutic Diet 
[]  Obtained/adjusted food preferences/tolerances and/or snacks options  
[]  Supplements added  
[] Occupational therapy following for feeding techniques []  HS snack added  
[x]  Modify diet texture  
[]  Modify diet for food allergies []  Educate patient  
[]  Assist with menu selection  
[x]  Monitor PO intake on meal rounds  
[x]  Continue inpatient monitoring and intervention  
[]  Participated in discharge planning/Interdisciplinary rounds/Team meetings  
[]  Other:  
 
Education Needs: 
 [] Not appropriate for teaching at this time due to: 
 [x] Identified and addressed Nutrition Monitoring and Evaluation: 
[x] Continue ongoing monitoring and intervention 
[] Other Zuleyka Berry

## 2018-12-30 NOTE — PROGRESS NOTES
Internal Medicine Progress Note Patient's Name: Tiago Curry Admit Date: 12/26/2018 Length of Stay: 4 Assessment/Plan Active Hospital Problems Diagnosis Date Noted  Status post arterial stent 12/26/2018  High cholesterol 12/26/2018  Hypertension 12/26/2018  Peripheral arterial disease ([facility] [address] 12/26/2018  COPD (chronic obstructive pulmonary disease) ([facility] [address] 12/26/2018  Insomnia 12/26/2018 - Diet and mobilization per primary team 
- Pain control PRN 
- PT/OT 
- Cont heparin gtt - BP on low side, will hold HCTZ for zack - Suspect confusion last night was sundowning, no signs of infection, will check UA 
- Patient answering many questions appropriately but occasionally unclear about others, daughter at bedside - F/u vasc surg - Cont acceptable home medications for chronic conditions  
- DVT protocol I have personally reviewed all pertinent labs and films that have officially resulted over the last 24 hours. I have personally checked for all pending labs that are awaiting final results. Subjective Pt s/e @ bedside Events from overnight noted Had some confusion, given haldol x 2 Denies any issues this morning Still having some intermittent confusion Denies CP or SOB Objective Visit Vitals /51 Pulse 80 Temp 100.2 °F (37.9 °C) Resp 15 Ht 5' 8\" (1.727 m) Wt 61.2 kg (134 lb 14.7 oz) SpO2 91% BMI 20.51 kg/m² Physical Exam: 
General Appearance: NAD, AA&Ox3 but unsure why he is in hospital 
Lungs: CTA with normal respiratory effort CV: RRR, no m/r/g Abdomen: soft, non-tender, normal bowel sounds Extremities: RLE wound C/D/I, purplish color throughout ext Neuro: No focal deficits, motor/sensory intact Lab/Data Reviewed: 
BMP:  
No results found for: NA, K, CL, CO2, AGAP, GLU, BUN, CREA, GFRAA, GFRNA 
CBC:  
Lab Results Component Value Date/Time  WBC 10.1 12/30/2018 04:05 AM  
 HGB 10.5 (L) 12/30/2018 04:05 AM  
 HCT 33.2 (L) 12/30/2018 04:05 AM  
  12/30/2018 04:05 AM  
 
 
Imaging Reviewed: 
No results found. Medications Reviewed: 
Current Facility-Administered Medications Medication Dose Route Frequency  haloperidol lactate (HALDOL) injection 2 mg  2 mg IntraVENous Q2H PRN  
 heparin 25,000 units in D5W 250 ml infusion  12-25 Units/kg/hr IntraVENous TITRATE  
 0.9% sodium chloride infusion 250 mL  250 mL IntraVENous PRN  
 lactated Ringers infusion  50 mL/hr IntraVENous CONTINUOUS  
 famotidine (PF) (PEPCID) 20 mg injection  20 mg IntraVENous Q12H  
 sodium chloride (NS) flush 5-10 mL  5-10 mL IntraVENous Q8H  
 sodium chloride (NS) flush 5-10 mL  5-10 mL IntraVENous PRN  
 dextrose 5% - 0.45% NaCl with KCl 20 mEq/L infusion  75 mL/hr IntraVENous CONTINUOUS  
 acetaminophen (TYLENOL) tablet 650 mg  650 mg Oral Q4H PRN  
 HYDROcodone-acetaminophen (NORCO) 5-325 mg per tablet 1 Tab  1 Tab Oral Q4H PRN  
 HYDROmorphone (PF) (DILAUDID) injection 1 mg  1 mg IntraVENous Q4H PRN  
 naloxone (NARCAN) injection 0.4 mg  0.4 mg IntraVENous PRN  
 ondansetron (ZOFRAN) injection 4 mg  4 mg IntraVENous Q4H PRN  
 aspirin tablet 325 mg  325 mg Oral DAILY  diazePAM (VALIUM) tablet 5 mg  5 mg Oral Q8H PRN  
 escitalopram oxalate (LEXAPRO) tablet 5 mg  5 mg Oral DAILY  rosuvastatin (CRESTOR) tablet 40 mg  40 mg Oral QHS  temazepam (RESTORIL) capsule 30 mg  30 mg Oral QHS PRN  
 losartan (COZAAR) tablet 100 mg  100 mg Oral DAILY  hydroCHLOROthiazide (HYDRODIURIL) tablet 25 mg  25 mg Oral DAILY  budesonide (PULMICORT) 500 mcg/2 ml nebulizer suspension  500 mcg Nebulization BID RT  
 arformoterol (BROVANA) neb solution 15 mcg  15 mcg Nebulization BID RT Lexa Meier DO Internal Medicine, Hospitalist 
Pager: [phone] [facility]

## 2018-12-30 NOTE — PROGRESS NOTES
Vascular Surgery Progress Note Admit Date: 2018 POD 2 Days Post-Op Procedure/Surgery:  Procedure(s): FEMORAL-POPLITEAL BYPASS Subjective:  
Pt transferred to surgical floor yesterday. Last night - hallucinating, awake most of the night, requiring haldol. Minimal sleep. This am awake conversive, oriented to person/place. C/o severe R leg pain. OOB with physical therapy. Prior heavy alcohol intake per daughter but nearly stopped this past month. Also smokes 1 ppd. Objective:  
 
Visit Vitals /51 Pulse 80 Temp 100.2 °F (37.9 °C) Resp 15 Ht 5' 8\" (1.727 m) Wt 61.2 kg (134 lb 14.7 oz) SpO2 91% BMI 20.51 kg/m² Temp (24hrs), Av.6 °F (37.6 °C), Min:98.5 °F (36.9 °C), Max:100.2 °F (37.9 °C) Physical Exam: 
GEN: A&Ox2, NAD unless moving R leg. HEENT:PERRL EOMI, non icteric, moist membranes. NECK: no JVD, supple. LUNG: clear b/l and unlabored breathing, decreased bases. HEART: regular. ABD: soft, NT. 
EXT: RLE - incisions intact w/o drainage, R upper thigh somewhat firm but nontender remainder of incisions supple. R foot warm perfused, hyperemia. Holding R leg somewhat contracted due to pain. PULSES: very faintly palpable R PT pulse - audible doppler signals. NEURO: no focal lateralizing deficits noted. Motor 5/5. Labs:  
Recent Results (from the past 24 hour(s)) PTT Collection Time: 18  3:26 PM  
Result Value Ref Range aPTT 90.4 (H) 23.0 - 36.4 SEC GLUCOSE, POC Collection Time: 18  5:10 PM  
Result Value Ref Range Glucose (POC) 135 (H) 70 - 110 mg/dL PTT Collection Time: 18  8:50 PM  
Result Value Ref Range aPTT 75.7 (H) 23.0 - 36.4 SEC  
PTT Collection Time: 18  4:05 AM  
Result Value Ref Range aPTT 78.1 (H) 23.0 - 36.4 SEC  
CBC WITH AUTOMATED DIFF Collection Time: 18  4:05 AM  
Result Value Ref Range WBC 10.1 4.6 - 13.2 K/uL  
 RBC 3.55 (L) 4.70 - 5.50 M/uL HGB 10.5 (L) 13.0 - 16.0 g/dL HCT 33.2 (L) 36.0 - 48.0 % MCV 93.5 74.0 - 97.0 FL  
 MCH 29.6 24.0 - 34.0 PG  
 MCHC 31.6 31.0 - 37.0 g/dL  
 RDW 14.0 11.6 - 14.5 % PLATELET 708 822 - 792 K/uL MPV 11.0 9.2 - 11.8 FL  
 NEUTROPHILS 84 (H) 40 - 73 % LYMPHOCYTES 5 (L) 21 - 52 % MONOCYTES 11 (H) 3 - 10 % EOSINOPHILS 0 0 - 5 % BASOPHILS 0 0 - 2 %  
 ABS. NEUTROPHILS 8.4 (H) 1.8 - 8.0 K/UL  
 ABS. LYMPHOCYTES 0.5 (L) 0.9 - 3.6 K/UL  
 ABS. MONOCYTES 1.1 0.05 - 1.2 K/UL  
 ABS. EOSINOPHILS 0.0 0.0 - 0.4 K/UL  
 ABS. BASOPHILS 0.0 0.0 - 0.1 K/UL  
 DF AUTOMATED    
PTT Collection Time: 12/30/18 10:00 AM  
Result Value Ref Range aPTT 57.2 (H) 23.0 - 36.4 SEC Assessment:  
 
Principal Problem: 
  Status post arterial stent (12/26/2018) Active Problems: 
  High cholesterol (12/26/2018) Hypertension (12/26/2018) Peripheral arterial disease ([facility] [address] (12/26/2018) COPD (chronic obstructive pulmonary disease) ([facility] [address] (12/26/2018) Insomnia (12/26/2018) POD#2 R fem bk pop vein bypass -clinically patent. Plan/Recommendations/Medical Decision Making:  
Family/daughter significant concern over disoriented behavior and lack of sleep last night - many questions regarding medications - lexapro/narcotics/benzodiazapine, etc. Time spent with family/daughter > 30 minutes, also requesting 'proxy' signature by daughter- informed by staff that the patient will need to sign as well but unable due to disorientation?  
- plan change from norco to percocet and dilaudid to 0.5-1.0 more frequently for better pain control. 
- discontinue pm benzo and change to low dose ambien and melatonin. - continue heparin 750units/hour  No changes and check PTT in am, change aspirin from 325 to 81mg/day.  
- OOB to chair with assistance and continue PT 
- needs ICS frequently.  
- do not recommend discontinue lexapro as this is a SSRI  As requested by the daughter - pt / family unsure if he was taking regularly but it is listed as a medication in our office outpt profile as well. - do not recommend nicotine patch due to long leg vein bypass. Reassured family that he may experience additional 'sundowning' tonight. Plans/changes d/w Dr. Richmond Gomez. Geraldine Garrett. Christine Middleton, [address] [facility] - [phone] December 30, 2018 
1:06 PM

## 2018-12-30 NOTE — PROGRESS NOTES
Bedside shift change report given to Rachel Mccall RN (oncoming nurse) by Fabien Zelaya RN (offgoing nurse). Report included the following information SBAR, Kardex and MAR.  
 
2100: PTT for heparin drip came back normal. No rate change required. Dual verified with Brad Dubois RN.  
 
2300: Daughter reports that patient is not acting like himself after receiving Restoril. Patient assessed A/O X4 however patient is anxious and agitated. Dr. Salazar paged and updated on the patients status. Order for haldol provided. 0200: Patient still agitated and pulled out IV to Right hand. Patient also Mike Gowers stat lock for urinary cath. New IV placed to the Right forearm and new stat lock placed to Left inner leg. Patient adjusted in bed with CNA and is resting in no apparent distress. Daughter at bedside. 0405: PTT for heparin drip came back normal. No rate change required. Dual verified with Brad Dubois RN. 
 
0730: Dr. Clarence Gaitan at bedside. Discussed patients night and lack of rest. Medications reviewed added Ducolax. UA ordered to rule out UTI. Daughter at bedside. Report given to oncluciana RN. Bedside shift change report given to Shaka Jose RN (oncoming nurse) by Rachel Mccall RN (offgoing nurse). Report included the following information SBAR, Kardex and MAR.

## 2018-12-30 NOTE — PROGRESS NOTES
Assumed care of patient, patient in bed resting. No c/o pain at this time. Call bell in reach. Sbar report received from [place] I went in patient room to assess him and administer medications, the daughter was upset that I was administering him lexapro and ask me not to give it to Mr. Radha López. I informed her that Mr. Radha López has not refused his medication but has the right to. Mr Radha López stated he want to received all scheduled medication. All medication was given as ordered. The daughter was very upset. [phone] [id number]; Ramirez Catheter removed, patient due to void at 0820. Spoke to son in law whom is a doctor concerning patient, I informed him of the patient alertness as this moment. I also informed him that I was not Mr. Jerry Granado nurse and I am unaware all details concerning patient last night. Per Dr. Rosio Costello  no increase leave rate at 12 units/ hr recheck patient  the morning. New orders received. Bedside / verbal shift change report given to hugo Camacho  (oncoming nurse) by Theresa Mccloud RN (offgoing nurse). Report included the following information SBAR, Kardex, Intake/Output, MAR and Recent Results.

## 2018-12-30 NOTE — PROGRESS NOTES
At approximately [phone], this  consulted with patient's nurse Bree Maciasutant concerning capacity of  Ian Marrero, a 74 y.o. male, to execute an Advance Medical Directive (AMD), since his family had requested AMD information. The  provided the following interventions: 
Consulted with patient's nurse Bree Adjutant re patient's capacity to execute a legal document. When she stated that he had medications in his system that could impact his capacity, I went to explain this to patient and his family. Patient was groggy and did not really enter into the conversation that I was having, chiefly with his daughter. I provided her with an AMD form and an explanation that we could attempt to assist patient in executing the document tomorrow. The following outcome was achieved: 
Patient's daughter expressed gratitude for the 's visit. Assessment: 
There are no spiritual or Congregational issues which require further Spiritual Care Services interventions at this time. Plan: 
Chaplains will continue to follow and will provide pastoral care on an as-needed/requested basis. We will be available to assist patient with an AMD when nursing staff feels he has capacity and has no meds on board that would impair his cognitive abilities. [facility]  [address] [facility]  
[phone]

## 2018-12-31 ENCOUNTER — ANESTHESIA (OUTPATIENT)
Dept: SURGERY | Age: 73
DRG: 253 | End: 2018-12-31
Payer: MEDICARE

## 2018-12-31 ENCOUNTER — ANESTHESIA EVENT (OUTPATIENT)
Dept: SURGERY | Age: 73
DRG: 253 | End: 2018-12-31
Payer: MEDICARE

## 2018-12-31 LAB
APTT PPP: 56.3 SEC (ref 23–36.4)
BASOPHILS # BLD: 0 K/UL (ref 0–0.1)
BASOPHILS NFR BLD: 0 % (ref 0–2)
CK MB CFR SERPL CALC: 0.4 % (ref 0–4)
CK MB SERPL-MCNC: 4.8 NG/ML (ref 5–25)
CK SERPL-CCNC: 1246 U/L (ref 39–308)
DIFFERENTIAL METHOD BLD: ABNORMAL
EOSINOPHIL # BLD: 0.1 K/UL (ref 0–0.4)
EOSINOPHIL NFR BLD: 1 % (ref 0–5)
ERYTHROCYTE [DISTWIDTH] IN BLOOD BY AUTOMATED COUNT: 14.1 % (ref 11.6–14.5)
HCT VFR BLD AUTO: 30.6 % (ref 36–48)
HCT VFR BLD AUTO: 33.5 % (ref 36–48)
HGB BLD-MCNC: 10.9 G/DL (ref 13–16)
HGB BLD-MCNC: 9.9 G/DL (ref 13–16)
LYMPHOCYTES # BLD: 0.7 K/UL (ref 0.9–3.6)
LYMPHOCYTES NFR BLD: 7 % (ref 21–52)
MCH RBC QN AUTO: 29.9 PG (ref 24–34)
MCHC RBC AUTO-ENTMCNC: 32.4 G/DL (ref 31–37)
MCV RBC AUTO: 92.4 FL (ref 74–97)
MONOCYTES # BLD: 1.1 K/UL (ref 0.05–1.2)
MONOCYTES NFR BLD: 11 % (ref 3–10)
NEUTS SEG # BLD: 8.2 K/UL (ref 1.8–8)
NEUTS SEG NFR BLD: 81 % (ref 40–73)
PLATELET # BLD AUTO: 196 K/UL (ref 135–420)
PMV BLD AUTO: 12.3 FL (ref 9.2–11.8)
RBC # BLD AUTO: 3.31 M/UL (ref 4.7–5.5)
TROPONIN I SERPL-MCNC: 0.04 NG/ML (ref 0–0.04)
WBC # BLD AUTO: 10 K/UL (ref 4.6–13.2)

## 2018-12-31 PROCEDURE — 77030034849

## 2018-12-31 PROCEDURE — 36415 COLL VENOUS BLD VENIPUNCTURE: CPT

## 2018-12-31 PROCEDURE — 76210000002 HC OR PH I REC 3 TO 3.5 HR: Performed by: SURGERY

## 2018-12-31 PROCEDURE — 74011000250 HC RX REV CODE- 250: Performed by: HOSPITALIST

## 2018-12-31 PROCEDURE — 74011000250 HC RX REV CODE- 250: Performed by: SURGERY

## 2018-12-31 PROCEDURE — 77030031139 HC SUT VCRL2 J&J -A: Performed by: SURGERY

## 2018-12-31 PROCEDURE — 77030002987 HC SUT PROL J&J -B: Performed by: SURGERY

## 2018-12-31 PROCEDURE — 74011250637 HC RX REV CODE- 250/637: Performed by: SURGERY

## 2018-12-31 PROCEDURE — 93005 ELECTROCARDIOGRAM TRACING: CPT

## 2018-12-31 PROCEDURE — 77030013567 HC DRN WND RESERV BARD -A: Performed by: SURGERY

## 2018-12-31 PROCEDURE — 77030037878 HC DRSG MEPILEX >48IN BORD MOLN -B

## 2018-12-31 PROCEDURE — 74011250637 HC RX REV CODE- 250/637: Performed by: HOSPITALIST

## 2018-12-31 PROCEDURE — 77030018836 HC SOL IRR NACL ICUM -A: Performed by: SURGERY

## 2018-12-31 PROCEDURE — 74011250636 HC RX REV CODE- 250/636

## 2018-12-31 PROCEDURE — 77030020263 HC SOL INJ SOD CL0.9% LFCR 1000ML: Performed by: SURGERY

## 2018-12-31 PROCEDURE — 74011000272 HC RX REV CODE- 272: Performed by: SURGERY

## 2018-12-31 PROCEDURE — 97165 OT EVAL LOW COMPLEX 30 MIN: CPT

## 2018-12-31 PROCEDURE — 77030014007 HC SPNG HEMSTAT J&J -B: Performed by: SURGERY

## 2018-12-31 PROCEDURE — 85730 THROMBOPLASTIN TIME PARTIAL: CPT

## 2018-12-31 PROCEDURE — 76010000171 HC OR TIME 2 TO 2.5 HR INTENSV-TIER 1: Performed by: SURGERY

## 2018-12-31 PROCEDURE — 77030002916 HC SUT ETHLN J&J -A: Performed by: SURGERY

## 2018-12-31 PROCEDURE — 77030008467 HC STPLR SKN COVD -B: Performed by: SURGERY

## 2018-12-31 PROCEDURE — 76060000035 HC ANESTHESIA 2 TO 2.5 HR: Performed by: SURGERY

## 2018-12-31 PROCEDURE — 74011250636 HC RX REV CODE- 250/636: Performed by: ANESTHESIOLOGY

## 2018-12-31 PROCEDURE — C1757 CATH, THROMBECTOMY/EMBOLECT: HCPCS | Performed by: SURGERY

## 2018-12-31 PROCEDURE — 97530 THERAPEUTIC ACTIVITIES: CPT

## 2018-12-31 PROCEDURE — 65610000006 HC RM INTENSIVE CARE

## 2018-12-31 PROCEDURE — 77030005518 HC CATH URETH FOL 2W BARD -B: Performed by: SURGERY

## 2018-12-31 PROCEDURE — 74011250636 HC RX REV CODE- 250/636: Performed by: SURGERY

## 2018-12-31 PROCEDURE — 77030013079 HC BLNKT BAIR HGGR 3M -A: Performed by: ANESTHESIOLOGY

## 2018-12-31 PROCEDURE — 94640 AIRWAY INHALATION TREATMENT: CPT

## 2018-12-31 PROCEDURE — 85018 HEMOGLOBIN: CPT

## 2018-12-31 PROCEDURE — 85025 COMPLETE CBC W/AUTO DIFF WBC: CPT

## 2018-12-31 PROCEDURE — 77030011267 HC ELECTRD BLD COVD -A: Performed by: SURGERY

## 2018-12-31 PROCEDURE — 77030032490 HC SLV COMPR SCD KNE COVD -B: Performed by: SURGERY

## 2018-12-31 PROCEDURE — 82550 ASSAY OF CK (CPK): CPT

## 2018-12-31 PROCEDURE — 77030002933 HC SUT MCRYL J&J -A: Performed by: SURGERY

## 2018-12-31 PROCEDURE — 77030002996 HC SUT SLK J&J -A: Performed by: SURGERY

## 2018-12-31 PROCEDURE — 77030012510 HC MSK AIRWY LMA TELE -B: Performed by: ANESTHESIOLOGY

## 2018-12-31 PROCEDURE — 047K0ZZ DILATION OF RIGHT FEMORAL ARTERY, OPEN APPROACH: ICD-10-PCS | Performed by: SURGERY

## 2018-12-31 PROCEDURE — 74011000250 HC RX REV CODE- 250

## 2018-12-31 PROCEDURE — 51798 US URINE CAPACITY MEASURE: CPT

## 2018-12-31 RX ORDER — DOPAMINE HYDROCHLORIDE 160 MG/100ML
INJECTION, SOLUTION INTRAVENOUS
Status: DISCONTINUED | OUTPATIENT
Start: 2018-12-31 | End: 2018-12-31 | Stop reason: HOSPADM

## 2018-12-31 RX ORDER — HEPARIN SODIUM 1000 [USP'U]/ML
INJECTION, SOLUTION INTRAVENOUS; SUBCUTANEOUS AS NEEDED
Status: DISCONTINUED | OUTPATIENT
Start: 2018-12-31 | End: 2018-12-31 | Stop reason: HOSPADM

## 2018-12-31 RX ORDER — NEOSTIGMINE METHYLSULFATE 5 MG/5 ML
SYRINGE (ML) INTRAVENOUS AS NEEDED
Status: DISCONTINUED | OUTPATIENT
Start: 2018-12-31 | End: 2018-12-31 | Stop reason: HOSPADM

## 2018-12-31 RX ORDER — CLINDAMYCIN PHOSPHATE 600 MG/50ML
600 INJECTION INTRAVENOUS EVERY 8 HOURS
Status: COMPLETED | OUTPATIENT
Start: 2018-12-31 | End: 2019-01-01

## 2018-12-31 RX ORDER — DOPAMINE HYDROCHLORIDE 160 MG/100ML
0-15 INJECTION, SOLUTION INTRAVENOUS
Status: DISCONTINUED | OUTPATIENT
Start: 2018-12-31 | End: 2019-01-01

## 2018-12-31 RX ORDER — CLINDAMYCIN PHOSPHATE 900 MG/50ML
INJECTION INTRAVENOUS AS NEEDED
Status: DISCONTINUED | OUTPATIENT
Start: 2018-12-31 | End: 2018-12-31 | Stop reason: HOSPADM

## 2018-12-31 RX ORDER — PROPOFOL 10 MG/ML
INJECTION, EMULSION INTRAVENOUS AS NEEDED
Status: DISCONTINUED | OUTPATIENT
Start: 2018-12-31 | End: 2018-12-31 | Stop reason: HOSPADM

## 2018-12-31 RX ORDER — VECURONIUM BROMIDE FOR INJECTION 1 MG/ML
INJECTION, POWDER, LYOPHILIZED, FOR SOLUTION INTRAVENOUS AS NEEDED
Status: DISCONTINUED | OUTPATIENT
Start: 2018-12-31 | End: 2018-12-31 | Stop reason: HOSPADM

## 2018-12-31 RX ORDER — HYDROMORPHONE HYDROCHLORIDE 2 MG/ML
0.5 INJECTION, SOLUTION INTRAMUSCULAR; INTRAVENOUS; SUBCUTANEOUS
Status: DISCONTINUED | OUTPATIENT
Start: 2018-12-31 | End: 2018-12-31 | Stop reason: HOSPADM

## 2018-12-31 RX ORDER — CLINDAMYCIN PHOSPHATE 900 MG/50ML
900 INJECTION INTRAVENOUS ONCE
Status: DISCONTINUED | OUTPATIENT
Start: 2018-12-31 | End: 2018-12-31

## 2018-12-31 RX ORDER — LIDOCAINE HYDROCHLORIDE 20 MG/ML
INJECTION, SOLUTION EPIDURAL; INFILTRATION; INTRACAUDAL; PERINEURAL AS NEEDED
Status: DISCONTINUED | OUTPATIENT
Start: 2018-12-31 | End: 2018-12-31 | Stop reason: HOSPADM

## 2018-12-31 RX ORDER — FENTANYL CITRATE 50 UG/ML
50 INJECTION, SOLUTION INTRAMUSCULAR; INTRAVENOUS AS NEEDED
Status: DISCONTINUED | OUTPATIENT
Start: 2018-12-31 | End: 2018-12-31 | Stop reason: HOSPADM

## 2018-12-31 RX ORDER — ONDANSETRON 2 MG/ML
INJECTION INTRAMUSCULAR; INTRAVENOUS AS NEEDED
Status: DISCONTINUED | OUTPATIENT
Start: 2018-12-31 | End: 2018-12-31 | Stop reason: HOSPADM

## 2018-12-31 RX ORDER — GLYCOPYRROLATE 0.2 MG/ML
INJECTION INTRAMUSCULAR; INTRAVENOUS AS NEEDED
Status: DISCONTINUED | OUTPATIENT
Start: 2018-12-31 | End: 2018-12-31 | Stop reason: HOSPADM

## 2018-12-31 RX ORDER — DEXAMETHASONE SODIUM PHOSPHATE 4 MG/ML
INJECTION, SOLUTION INTRA-ARTICULAR; INTRALESIONAL; INTRAMUSCULAR; INTRAVENOUS; SOFT TISSUE AS NEEDED
Status: DISCONTINUED | OUTPATIENT
Start: 2018-12-31 | End: 2018-12-31 | Stop reason: HOSPADM

## 2018-12-31 RX ORDER — SODIUM CHLORIDE, SODIUM LACTATE, POTASSIUM CHLORIDE, CALCIUM CHLORIDE 600; 310; 30; 20 MG/100ML; MG/100ML; MG/100ML; MG/100ML
50 INJECTION, SOLUTION INTRAVENOUS CONTINUOUS
Status: DISCONTINUED | OUTPATIENT
Start: 2018-12-31 | End: 2018-12-31

## 2018-12-31 RX ORDER — ONDANSETRON 2 MG/ML
4 INJECTION INTRAMUSCULAR; INTRAVENOUS ONCE
Status: DISCONTINUED | OUTPATIENT
Start: 2018-12-31 | End: 2018-12-31 | Stop reason: HOSPADM

## 2018-12-31 RX ORDER — FAMOTIDINE 20 MG/1
20 TABLET, FILM COATED ORAL 2 TIMES DAILY
Status: DISCONTINUED | OUTPATIENT
Start: 2018-12-31 | End: 2019-01-04 | Stop reason: HOSPADM

## 2018-12-31 RX ORDER — FENTANYL CITRATE 50 UG/ML
INJECTION, SOLUTION INTRAMUSCULAR; INTRAVENOUS AS NEEDED
Status: DISCONTINUED | OUTPATIENT
Start: 2018-12-31 | End: 2018-12-31 | Stop reason: HOSPADM

## 2018-12-31 RX ORDER — SODIUM CHLORIDE, SODIUM LACTATE, POTASSIUM CHLORIDE, CALCIUM CHLORIDE 600; 310; 30; 20 MG/100ML; MG/100ML; MG/100ML; MG/100ML
INJECTION, SOLUTION INTRAVENOUS
Status: DISCONTINUED | OUTPATIENT
Start: 2018-12-31 | End: 2018-12-31 | Stop reason: HOSPADM

## 2018-12-31 RX ORDER — SODIUM CHLORIDE 9 MG/ML
25 INJECTION, SOLUTION INTRAVENOUS CONTINUOUS
Status: DISCONTINUED | OUTPATIENT
Start: 2018-12-31 | End: 2019-01-02

## 2018-12-31 RX ADMIN — DOPAMINE HYDROCHLORIDE IN DEXTROSE 7.5 MCG/KG/MIN: 1.6 INJECTION, SOLUTION INTRAVENOUS at 19:37

## 2018-12-31 RX ADMIN — ESCITALOPRAM OXALATE 5 MG: 10 TABLET ORAL at 10:00

## 2018-12-31 RX ADMIN — CLINDAMYCIN IN 5 PERCENT DEXTROSE 600 MG: 12 INJECTION, SOLUTION INTRAVENOUS at 23:04

## 2018-12-31 RX ADMIN — LOSARTAN POTASSIUM 100 MG: 50 TABLET ORAL at 09:59

## 2018-12-31 RX ADMIN — BUDESONIDE 500 MCG: 0.5 INHALANT RESPIRATORY (INHALATION) at 20:53

## 2018-12-31 RX ADMIN — Medication 3 MG: at 16:30

## 2018-12-31 RX ADMIN — Medication 10 ML: at 23:09

## 2018-12-31 RX ADMIN — DOPAMINE HYDROCHLORIDE 10 MCG/KG/MIN: 160 INJECTION, SOLUTION INTRAVENOUS at 14:56

## 2018-12-31 RX ADMIN — LIDOCAINE HYDROCHLORIDE 100 MG: 20 INJECTION, SOLUTION EPIDURAL; INFILTRATION; INTRACAUDAL; PERINEURAL at 14:37

## 2018-12-31 RX ADMIN — VECURONIUM BROMIDE FOR INJECTION 3 MG: 1 INJECTION, POWDER, LYOPHILIZED, FOR SOLUTION INTRAVENOUS at 15:39

## 2018-12-31 RX ADMIN — GLYCOPYRROLATE 0.4 MG: 0.2 INJECTION INTRAMUSCULAR; INTRAVENOUS at 16:30

## 2018-12-31 RX ADMIN — ONDANSETRON 4 MG: 2 INJECTION INTRAMUSCULAR; INTRAVENOUS at 16:25

## 2018-12-31 RX ADMIN — SODIUM CHLORIDE 75 ML/HR: 900 INJECTION, SOLUTION INTRAVENOUS at 17:05

## 2018-12-31 RX ADMIN — FAMOTIDINE 20 MG: 10 INJECTION, SOLUTION INTRAVENOUS at 09:57

## 2018-12-31 RX ADMIN — OXYCODONE AND ACETAMINOPHEN 1 TABLET: 5; 325 TABLET ORAL at 21:10

## 2018-12-31 RX ADMIN — ARFORMOTEROL TARTRATE 15 MCG: 15 SOLUTION RESPIRATORY (INHALATION) at 09:20

## 2018-12-31 RX ADMIN — ASPIRIN 81 MG 81 MG: 81 TABLET ORAL at 09:59

## 2018-12-31 RX ADMIN — DEXAMETHASONE SODIUM PHOSPHATE 4 MG: 4 INJECTION, SOLUTION INTRA-ARTICULAR; INTRALESIONAL; INTRAMUSCULAR; INTRAVENOUS; SOFT TISSUE at 16:15

## 2018-12-31 RX ADMIN — HEPARIN SODIUM 6000 UNITS: 1000 INJECTION, SOLUTION INTRAVENOUS; SUBCUTANEOUS at 15:31

## 2018-12-31 RX ADMIN — ZOLPIDEM TARTRATE 5 MG: 5 TABLET ORAL at 23:04

## 2018-12-31 RX ADMIN — ZOLPIDEM TARTRATE 5 MG: 5 TABLET ORAL at 01:58

## 2018-12-31 RX ADMIN — ARFORMOTEROL TARTRATE 15 MCG: 15 SOLUTION RESPIRATORY (INHALATION) at 20:53

## 2018-12-31 RX ADMIN — OXYCODONE AND ACETAMINOPHEN 1 TABLET: 5; 325 TABLET ORAL at 08:30

## 2018-12-31 RX ADMIN — CLINDAMYCIN PHOSPHATE 900 MG: 900 INJECTION INTRAVENOUS at 15:00

## 2018-12-31 RX ADMIN — PROPOFOL 120 MG: 10 INJECTION, EMULSION INTRAVENOUS at 14:37

## 2018-12-31 RX ADMIN — BUDESONIDE 500 MCG: 0.5 INHALANT RESPIRATORY (INHALATION) at 09:20

## 2018-12-31 RX ADMIN — SODIUM CHLORIDE, SODIUM LACTATE, POTASSIUM CHLORIDE, CALCIUM CHLORIDE: 600; 310; 30; 20 INJECTION, SOLUTION INTRAVENOUS at 14:30

## 2018-12-31 RX ADMIN — FENTANYL CITRATE 50 MCG: 50 INJECTION, SOLUTION INTRAMUSCULAR; INTRAVENOUS at 15:10

## 2018-12-31 RX ADMIN — ROSUVASTATIN CALCIUM 40 MG: 20 TABLET, FILM COATED ORAL at 23:04

## 2018-12-31 RX ADMIN — ROSUVASTATIN CALCIUM 40 MG: 20 TABLET, FILM COATED ORAL at 01:59

## 2018-12-31 RX ADMIN — FENTANYL CITRATE 50 MCG: 50 INJECTION, SOLUTION INTRAMUSCULAR; INTRAVENOUS at 14:30

## 2018-12-31 NOTE — ANESTHESIA PREPROCEDURE EVALUATION
Anesthetic History Review of Systems / Medical History Patient summary reviewed, nursing notes reviewed and pertinent labs reviewed Pulmonary COPD: moderate Smoker Neuro/Psych Within defined limits Cardiovascular Hypertension: poorly controlled PAD 
 
 
  
GI/Hepatic/Renal 
Within defined limits Endo/Other Other Findings Physical Exam 
 
Airway Mallampati: II 
TM Distance: 4 - 6 cm Neck ROM: normal range of motion Mouth opening: Normal 
 
 Cardiovascular Regular rate and rhythm,  S1 and S2 normal,  no murmur, click, rub, or gallop Rhythm: regular Rate: normal 
 
 
 
 Dental 
 
Dentition: Edentulous Pulmonary Decreased breath sounds: bilateral 
 
 
 
 
 Abdominal 
GI exam deferred Other Findings Anesthetic Plan ASA: 4 Anesthesia type: general 
 
Monitoring Plan: Arterial line Induction: Intravenous Anesthetic plan and risks discussed with: Patient

## 2018-12-31 NOTE — PROGRESS NOTES
1922 Received patient from [address] [place] [address]. Patient is alert and oriented x 4. Informed patient Ramirez catheter was taken out at 12 noon therefore DTV at or around 2000. 
 
2200 DTV time came and went, time for bladder scan, Aid helped in this. >346 mL was the reading. Called MD with results NO to wait and encourage patient to void all evening, re scan in morning if no voiding call again if greater than 500 mL. 2119 Patient requested another bladder scan as preformed by this nurse, which revealed >397 Still not quite at 500 as MD had stated. Position change brought discomfort. 8361 Upon charge nurses instruction patient was bladder scanned once again this shift Patient HAD at this time output 100 mL x 2.  Bladder scan showed 498 mL in the bladder this was passed to day shift nurse. 4674 Bedside and Verbal shift change report given to Alex Morse RN (oncoming nurse) by Sagar Coombs RN (offgoing nurse). Report included the following information SBAR, Kardex, Intake/Output, MAR and Recent Results.

## 2018-12-31 NOTE — ANESTHESIA POSTPROCEDURE EVALUATION
Procedure(s): FEMORAL-POPLITEAL BYPASS  POSSIBLE THROMBECTOMY POSSIBLE ANGIO. Anesthesia Post Evaluation Multimodal analgesia: multimodal analgesia used between 6 hours prior to anesthesia start to PACU discharge Patient location during evaluation: bedside Patient participation: complete - patient participated Level of consciousness: awake Pain management: adequate Airway patency: patent Anesthetic complications: no 
Cardiovascular status: acceptable Respiratory status: acceptable Hydration status: acceptable Visit Vitals /47 Pulse 89 Temp 37.8 °C (100 °F) Resp 19 Ht 5' 8\" (1.727 m) Wt 61.2 kg (134 lb 14.7 oz) SpO2 100% BMI 20.51 kg/m²

## 2018-12-31 NOTE — PERIOP NOTES
Recd care of pt OR via bed. Resp even and unlabored. Attached to monitor. VSS. Pt on Dopamine gtt at 10mcg/kg/min. Ramirez patent with clear yellow urine. RENUKA drain noted lower incision site right leg. OR, MAR and anesthesia report acknowledged. Will cont to monitor. 1903  Blood pressure 88/42, Dopamine restarted at 5mcg/kg/min. Pt alert and oriented. Will cont to monitor. 1920  Dr. Ben Jim and pt's family (wife and daughter) at bedside. Will cont to monitor. 2000  TRANSFER - OUT REPORT: 
 
Verbal report given to Miguelina Pond RN (name) on Annelle Lundborg  being transferred to [phone] (unit) for routine post - op Report consisted of patients Situation, Background, Assessment and  
Recommendations(SBAR). Information from the following report(s) SBAR, OR Summary, Intake/Output, MAR and Cardiac Rhythm sinus rhythm with PVCs was reviewed with the receiving nurse. Lines:  
Peripheral IV 12/29/18 Distal;Inferior; Lower; Posterior;Right Arm (Active) Site Assessment Clean;Dry 12/31/2018 11:53 AM  
Phlebitis Assessment 0 12/31/2018 11:53 AM  
Infiltration Assessment 0 12/31/2018 11:53 AM  
Dressing Status Clean;Dry 12/31/2018 11:53 AM  
Dressing Type Tape 12/31/2018 11:53 AM  
Hub Color/Line Status Blue; Infusing 12/31/2018 11:53 AM  
Action Taken Open ports on tubing capped 12/31/2018 11:53 AM  
Alcohol Cap Used Yes 12/31/2018 11:53 AM  
   
Peripheral IV 12/30/18 Anterior; Inferior; Lower;Right Arm (Active) Site Assessment Clean, dry, & intact 12/31/2018  4:48 PM  
Phlebitis Assessment 0 12/31/2018  4:48 PM  
Infiltration Assessment 0 12/31/2018  4:48 PM  
Dressing Status Clean, dry, & intact 12/31/2018  4:48 PM  
Dressing Type Transparent;Tape 12/31/2018  4:48 PM  
Hub Color/Line Status Pink; Infusing 12/31/2018  4:48 PM  
Action Taken Open ports on tubing capped 12/31/2018  4:48 PM  
Alcohol Cap Used Yes 12/31/2018  4:48 PM  
   
 Peripheral IV 12/31/18 Anterior;Distal;Inferior;Left;Lower Arm (Active) Opportunity for questions and clarification was provided. Patient transported with: 
 Monitor O2 @ 2 liters liters Registered Nurse

## 2018-12-31 NOTE — PROGRESS NOTES
[address] [place], [place] [address] Daily progress Note Patient: Maci Davidson MRN: [id number]  CSN: [id number] YOB: 1945  Age: 74 y.o. Sex: male DOA: 12/26/2018 LOS:  LOS: 5 days Assessment/Plan Principal Problem: 
  Status post arterial stent (12/26/2018) Active Problems: 
  High cholesterol (12/26/2018) Hypertension (12/26/2018) Peripheral arterial disease ([facility] (12/26/2018) COPD (chronic obstructive pulmonary disease) ([facility] (12/26/2018) Insomnia (12/26/2018) Decreased pulses R leg with discoloration of leg & foot - ? Stent closure - Per Nursing pt to go back to the OR today for re eval of stent Will follow post op Subjective:  
 
CC: 
Pt is having R leg pain & decreased pulses in R leg - discussed with vascular & it appears that the stent could be blocked & pt will go in for revision surg today Objective:  
  
 
Visit Vitals /71 Pulse 83 Temp 98 °F (36.7 °C) Resp 17 Ht 5' 8\" (1.727 m) Wt 61.2 kg (134 lb 14.7 oz) SpO2 95% BMI 20.51 kg/m² Physical Exam: 
Visit Vitals /71 Pulse 83 Temp 98 °F (36.7 °C) Resp 17 Ht 5' 8\" (1.727 m) Wt 61.2 kg (134 lb 14.7 oz) SpO2 95% BMI 20.51 kg/m² General appearance: alert, cooperative, no distress, appears stated age HEENT: negative Neck: supple, symmetrical, trachea midline, no adenopathy, thyroid: not enlarged, symmetric, no tenderness/mass/nodules, no carotid bruit and no JVD Lungs: clear to auscultation bilaterally Heart: regular rate and rhythm, S1, S2 normal, no murmur, click, rub or gallop Abdomen: soft, non-tender. Bowel sounds normal. No masses,  no organomegaly Pulses: R leg discoloration with decreased pulses R foot Skin: skin discoloration R leg & foot - dusky & bluish Intake and Output: 
Current Shift:  12/31 0701 - 12/31 1900 In: -  
Out: 1 Kayleigh Porras [BPZ:5042] Last three shifts:  12/29 1901 - 12/31 0700 In: 0 Out: 1700 [Urine:1700] Recent Results (from the past 24 hour(s)) CBC WITH AUTOMATED DIFF Collection Time: 12/31/18  5:40 AM  
Result Value Ref Range WBC 10.0 4.6 - 13.2 K/uL  
 RBC 3.31 (L) 4.70 - 5.50 M/uL HGB 9.9 (L) 13.0 - 16.0 g/dL HCT 30.6 (L) 36.0 - 48.0 % MCV 92.4 74.0 - 97.0 FL  
 MCH 29.9 24.0 - 34.0 PG  
 MCHC 32.4 31.0 - 37.0 g/dL  
 RDW 14.1 11.6 - 14.5 % PLATELET 508 171 - 196 K/uL MPV 12.3 (H) 9.2 - 11.8 FL  
 NEUTROPHILS 81 (H) 40 - 73 % LYMPHOCYTES 7 (L) 21 - 52 % MONOCYTES 11 (H) 3 - 10 % EOSINOPHILS 1 0 - 5 % BASOPHILS 0 0 - 2 %  
 ABS. NEUTROPHILS 8.2 (H) 1.8 - 8.0 K/UL  
 ABS. LYMPHOCYTES 0.7 (L) 0.9 - 3.6 K/UL  
 ABS. MONOCYTES 1.1 0.05 - 1.2 K/UL  
 ABS. EOSINOPHILS 0.1 0.0 - 0.4 K/UL  
 ABS. BASOPHILS 0.0 0.0 - 0.1 K/UL  
 DF AUTOMATED    
PTT Collection Time: 12/31/18  5:40 AM  
Result Value Ref Range aPTT 56.3 (H) 23.0 - 36.4 SEC Current Facility-Administered Medications:  
  famotidine (PEPCID) tablet 20 mg, 20 mg, Oral, BID, Philip Taylor MD 
  bacitracin 50,000 Units in sodium chloride irrigation 0.9 % 1,000 mL Irrigation, , , PRN, Yeimi Tapia MD 
  heparin (porcine) 4,000 Units in 0.9% sodium chloride 1,000 mL iv solution, , , PRN, Yeimi Tapia MD 
  clindamycin (CLEOCIN) 900mg D5W 50mL IVPB (premix), 900 mg, IntraVENous, ONCE, Yeimi Tapia MD 
  bacitracin 50,000 Units in sodium chloride irrigation 0.9 % 500 mL Irrigation, , , PRN, Yeimi Tapia MD 
  heparin (porcine) 2,000 Units in 0.9% sodium chloride 1,000 mL iv solution, , , PRN, Yeimi Tapia MD 
  gelatin absorbable (GELFOAM) 100 sponge, , , PRN, Tacos Matthews MD, 1 Each at 12/31/18 7007   thrombin (bovine) (THROMBIN-JMI) 5,000 unit topical solution, , , PRN, Tacos Matthews MD, 20,000 Units at 12/31/18 1606   haloperidol lactate (HALDOL) injection 2 mg, 2 mg, IntraVENous, Q2H PRN, Ness Robles DO, 2 mg at 12/30/18 0324 
  oxyCODONE-acetaminophen (PERCOCET) 5-325 mg per tablet 1-2 Tab, 1-2 Tab, Oral, Q4H PRN, Faisal Cabrales MD, 1 Tab at 12/31/18 0830 
  HYDROmorphone (DILAUDID) syringe 0.5-1 mg, 0.5-1 mg, IntraVENous, Q2H PRN, Oli Tapia MD, 1 mg at 12/30/18 0693   aspirin chewable tablet 81 mg, 81 mg, Oral, DAILY, Oli Tapia MD, 81 mg at 12/31/18 6753   zolpidem (AMBIEN) tablet 5 mg, 5 mg, Oral, QHS PRN, Oli Tapia MD, 5 mg at 12/31/18 7902   melatonin tablet 1.5 mg, 1.5 mg, Oral, QHS PRN, Oli Tapia MD 
  0.9% sodium chloride infusion 250 mL, 250 mL, IntraVENous, PRN, Messi Mccallum MD 
  sodium chloride (NS) flush 5-10 mL, 5-10 mL, IntraVENous, Q8H, Marisabel Taylor MD, 10 mL at 12/30/18 1853   sodium chloride (NS) flush 5-10 mL, 5-10 mL, IntraVENous, PRN, Laurita Taylor MD, 10 mL at 12/28/18 2109 
  dextrose 5% - 0.45% NaCl with KCl 20 mEq/L infusion, 75 mL/hr, IntraVENous, CONTINUOUS, Oli Tapia MD, Last Rate: 25 mL/hr at 12/30/18 1401, 25 mL/hr at 12/30/18 1401   acetaminophen (TYLENOL) tablet 650 mg, 650 mg, Oral, Q4H PRN, Marisabel Taylor MD 
  naloxone (NARCAN) injection 0.4 mg, 0.4 mg, IntraVENous, PRN, Marisabel Taylor MD 
  ondansetron (ZOFRAN) injection 4 mg, 4 mg, IntraVENous, Q4H PRN, Marisabel Taylor MD 
  diazePAM (VALIUM) tablet 5 mg, 5 mg, Oral, Q8H PRN, Kaitlyn Marieee H, DO 
  escitalopram oxalate (LEXAPRO) tablet 5 mg, 5 mg, Oral, DAILY, Kaitlyn Brome H, DO, 5 mg at 12/31/18 1000 
  rosuvastatin (CRESTOR) tablet 40 mg, 40 mg, Oral, QHS, Kaitlyn Marieee H, DO, 40 mg at 12/31/18 0108   losartan (COZAAR) tablet 100 mg, 100 mg, Oral, DAILY, Kaitlyn Marieee H, DO, 100 mg at 12/31/18 1668   budesonide (PULMICORT) 500 mcg/2 ml nebulizer suspension, 500 mcg, Nebulization, BID RT, Particia Heckler, DO, 500 mcg at 12/31/18 0920 
  arformoterol (BROVANA) neb solution 15 mcg, 15 mcg, Nebulization, BID RT, Particia Heckler, DO, 15 mcg at 12/31/18 8656 Facility-Administered Medications Ordered in Other Encounters:  
  PHENYLephrine (ELLIOTT-SYNEPHRINE) 10,000 mcg in 0.9% sodium chloride 100 mL infusion, , IntraVENous, CONTINUOUS, Tana Guadarrama, CRNA, 100 mcg at 12/31/18 1523 
  fentaNYL citrate (PF) injection, , IntraVENous, PRN, Tana Guadarrama, CRNA, 50 mcg at 12/31/18 1510   lidocaine (PF) (XYLOCAINE) 20 mg/mL (2 %) injection, , IntraVENous, PRN, Tana Guadarrama CRNA, 100 mg at 12/31/18 1437   propofol (DIPRIVAN) 10 mg/mL injection, , IntraVENous, PRN, Tana Guadarrama CRNA, 120 mg at 12/31/18 1437 
  lactated Ringers infusion, , IntraVENous, CONTINUOUS, Tana Guadarrama, CRNA   DOPamine (INTROPIN) 800 mg in dextrose 5% 500 mL infusion, , , CONTINUOUS, Tana Guadarrama CRNA, Stopped at 12/31/18 9065   clindamycin (CLEOCIN) 900mg D5W 50mL IVPB (premix), , IntraVENous, PRN, Tana Guadarrama, CRNA, 900 mg at 12/31/18 1500 
  heparin (porcine) 1,000 unit/mL injection, , IntraVENous, PRN, Tana Guadarrama, CRNA, 6,000 Units at 12/31/18 1531 
  vecuronium (NORCURON) injection, , IntraVENous, PRN, aTna Guadarrama CRNA, 3 mg at 12/31/18 1539 
  dexamethasone (DECADRON) 4 mg/mL injection, , IntraVENous, PRN, Brein, [place], CRNA, 4 mg at 12/31/18 1615   ondansetron (ZOFRAN) injection, , , PRN, Brein, [place], CRNA, 4 mg at 12/31/18 1625 
  glycopyrrolate (ROBINUL) injection, , IntraVENous, PRN, Brein, [place], CRNA, 0.4 mg at 12/31/18 1630 
  neostigmine methylsulfate (PROSTIGMINE) 1 mg/mL syringe, , IntraVENous, PRN, Brein, [place], CRNA, 3 mg at 12/31/18 1630 Lab Results Component Value Date/Time  Glucose 118 (H) 12/29/2018 01:15 AM  
 Glucose 89 12/26/2018 02:05 PM  
 Glucose 90 05/19/2015 12:49 PM  
  
 
 
 
 
 
 
Cecilia Aguilera MD 
12/31/2018, 4:42 PM

## 2018-12-31 NOTE — PROGRESS NOTES
Problem: Falls - Risk of 
Goal: *Absence of Falls Document Dalila Pinedo Fall Risk and appropriate interventions in the flowsheet. Outcome: Progressing Towards Goal 
Fall Risk Interventions: 
Mobility Interventions: Bed/chair exit alarm Medication Interventions: Evaluate medications/consider consulting pharmacy Elimination Interventions: Toileting schedule/hourly rounds Problem: Pressure Injury - Risk of 
Goal: *Prevention of pressure injury Document Gee Scale and appropriate interventions in the flowsheet. Outcome: Progressing Towards Goal 
Pressure Injury Interventions: 
Sensory Interventions: Assess changes in LOC Moisture Interventions: Absorbent underpads Activity Interventions: PT/OT evaluation Mobility Interventions: PT/OT evaluation Nutrition Interventions: Document food/fluid/supplement intake Friction and Shear Interventions: HOB 30 degrees or less

## 2018-12-31 NOTE — PROGRESS NOTES
Problem: Self Care Deficits Care Plan (Adult) Goal: *Acute Goals and Plan of Care (Insert Text) Occupational Therapy Goals Initiated 12/31/2018 within 7 day(s). 1.  Patient will perform grooming tasks at EOB with fair+ dynamic sitting balance. 2.  Patient will perform lower body dressing with moderate assistance utilizing adaptive strategies, prn. 
3.  Patient will perform functional task in standing for 8 minutes with supervision for balance and < 3 rest breaks to increase activity tolerance for ADLs. 4.  Patient will perform toilet transfers with minimal assistance. 5.  Patient will perform all aspects of toileting with minimal assistance/contact guard assist. 
6.  Patient will participate in upper extremity therapeutic exercise/activities with supervision/set-up for 8 minutes to increase BUE strength for functional transfers and ADLs. 7.  Patient will utilize energy conservation techniques during functional activities with minimal verbal cues. Outcome: [address] Occupational THERAPY: Initial Assessment INPATIENT: Medicare: Hospital Day: 6 Patient: Florentino Alford (39 y.o. male)    Date: 12/31/2018 Primary Diagnosis: Status post arterial stent Procedure(s) (LRB): FEMORAL-POPLITEAL BYPASS (Right), 3 Days Post-Op, Precautions: Falls PLOF: Pt was independent with basic self care tasks and functional mobility PTA. ASSESSMENT:  
Based on the objective data described below, the patient presents with impairments with regard to bed mobility, activity tolerance and independence in ADLs secondary to R fem pop. Pt supine on arrival, family at bedside. Pt/family somewhat anxious t/o session. Pt requiring increased time & encouragement for all functional tasks during evaluation. Co-treated with PT to maximize pt safety & participation. C/o 0/10 pain on arrival, increased time to manage BLEs to EOB. Mod A x2 for supine-->sit. Min/mod A x2 to stand x2 trials with RW; posterior lean during 1st attempt which improved during second attempt. Pt educated on proper positioning with RW in prep for [facility] transfer. Pt deferred further tx due to 5/10 pain and fatigue. Max A x2 for sit-->supine. Needs within reach. Recommend SNF upon d/c. Recommendations for the next treatment session: BSC transfer; LB dressing Mr. Meaghan Kaur will benefit from skilled intervention to address the above impairments. His rehabilitation potential is considered to be Fair. EDUCATION Education:  Patient was educated on the following topics: importance of mobility, role of OT and [place] [address] Barriers to Learning/Limitations: None Compensate with: visual, verbal, tactile, kinesthetic cues/model PLAN OF CARE:  
Problems:  Decreased ROM, Decreased strength affecting function, Decreased ADL/functioning of activities and Decreased transfer abilities Recommendations and Planned Interventions: 
[x]                  Self Care Training                   [x]         Therapeutic Activities [x]                  Functional Mobility Training    []          Cognitive Retraining 
[x]                  Therapeutic Exercises            [x]          Endurance Activities [x]                  Balance Training                     []          Neuromuscular Re-ed []                  Visual/Perceptual Training      [x]       Home Safety Training 
[]                  Patient Education                    [x]          Family Training/Education []                  Other (comment): Frequency/Duration: Patient will be followed by occupational therapy 3-5 times a week to address goals. Discharge Recommendations: Matt Connolly Further Equipment Recommendations for Discharge: bedside commode SUBJECTIVE:  
Patient stated: \"Grab under my heel. \" OBJECTIVE/TREATMENT:  
 
Past Medical History:  
Diagnosis Date  High cholesterol  Hypertension Past Surgical History:  
Procedure Laterality Date  HX APPENDECTOMY  HX MOHS PROCEDURES Left  HX TONSIL AND ADENOIDECTOMY Eval Complexity: History: LOW Complexity : Brief history review ; Examination: MEDIUM Complexity : 3-5 performance deficits relating to physical, cognitive , or psychosocial skils that result in activity limitations and / or participation restrictions; Decision Making:MEDIUM Complexity : Patient may present with comorbidities that affect occupational performnce. Miniml to moderate modification of tasks or assistance (eg, physical or verbal ) with assesment(s) is necessary to enable patient to complete evaluation G CODES: Self Care  Current  CL= 60-79%  Goal  CJ= 20-39%. The severity rating is based on the Other Functional Assessment, MMT, ROM Prior Level of Function/Home Situation: Restricted in physically strenuous activity but ambulatory and able to carry out work of a light or sedentary nature (e.g., light house work, office work). Lives with Family Shower chair, Grab bars Cognitive/Behavioral Status:  
Neurologic State: alert Orientation: oriented to time, place, person and situation Cognition:   appropriate decision making, appropriate for age attention/concentration and following commands  follows multi-step simple commands/direction Safety/Judgement: Awareness of environment and Fall prevention ROM: within normal limits (BUEs) MMT: 4/5 (BUEs) Coordination: WFL BUEs Hand dominance:Right Skin: Intact (BUEs) Edema: None noted (BUEs) Sensation: Intact (BUEs) Vision/Perceptual: normal 
 
 
Functional Status Indep Mod I  
Sup. / 
Set- Up SBA CGA Min Assist  
Mod Assist  
Max assist  
Total Assist  
Assist x2 Additional Time NT Comments Rolling []  []  []  []  []    []    [x]    []  []  []  []  [] Supine to sit []  []  []  []  []  []  [x]  []  []  [x]  [x]  [] Sit to supine []  []  []  []  []  []  []  [x]  []  [x]  []  [] Sit to stand []  []  []  []  []  [x]  [x]  []  []  [x]  []  [] Toilet Transfer []  []  []  []  []  []  []  []  []  []  []  [x] Feeding []  []  [x]  []  []  []  []  []  []  []  []  []    
Grooming []  []  [x]  []  []  []  []  []  []  []  []  [] Bathing  []  []  []  []  []  []  []  [x]  []  []  []  []    
UB Dressing  []  []  [x]  []  []  []  []  []  []  []  []  []    
LB Dressing  []  []  []  []  []  []  []  [x]  []  []  []  [] Toileting []  []  []  []  []  []  []  [x]  []  []  []  [] Balance Good Keila Beers Poor Unable Comments Sitting static []  [x]  []  [] Sitting dynamic []  [x]  []  []    
Standing static []  [x]  []  []  Fair-  
Standing dynamic []  []  []  [x] Pain:  
Pre treatment: 0/10 Post treatment: 5/10 Scale: numeric Activity tolerance:  fair- 
COMMUNICATION/EDUCATION: Pt/family educated on role of OT and POC; they verbalized understanding. [x]         Fall prevention education was provided and the patient/caregiver indicated understanding. [x]         Patient/family have participated as able in goal setting and plan of care. [x]         Patient/family agree to work toward stated goals and plan of care. []         Patient understands intent and goals of therapy, but is neutral about his/her participation The patients plan of care was also discussed with: Physical Therapist, Certified Occupational Therapy Assistant and Registered Nurse. · After treatment position/precautions:  
o Supine in bed 
o Caregiver at bedside 
o Call light within reach 
o RN notified 
o Family at bedside Recommendations for nursing: up with assist x2 Thank you for this referral. 
Meseret Lovelace MS OTR/L Time Calculation: 27 mins

## 2018-12-31 NOTE — PROGRESS NOTES
Problem: Mobility Impaired (Adult and Pediatric) Goal: *Acute Goals and Plan of Care (Insert Text) Physical Therapy Goals Initiated 12/30/2018 and to be accomplished within 7 day(s) 1. Patient will move from supine to sit and sit to supine , scoot up and down and roll side to side in bed with minimal assistance/contact guard assist.    
2.  Patient will transfer from bed to chair and chair to bed with minimal assistance/contact guard assist using the least restrictive device. 3.  Patient will perform sit to stand with minimal assistance/contact guard assist. 
4.  Patient will ambulate with minimal assistance/contact guard assist for >/= 75 feet with the least restrictive device. 5.  Patient will demonstrate independence with performance of home exercise program. 
  
Outcome: Progressing Towards Goal 
 
PHYSICAL THERAPY: Daily TREATMENT Note INPATIENT: Medicare: Hospital Day: 6 Patient: Roe Guerra (32 y.o. male)    Date: 12/31/2018 Primary Diagnosis: Status post arterial stent Procedure(s) (LRB): FEMORAL-POPLITEAL BYPASS (Right), 3 Days Post-Op, Precautions:   
 
 
Chart, physical therapy assessment, plan of care and goals were reviewed. PLOF:Independent ASSESSMENT: 
Pt co-treated with OT to maximize pt benefit; demonstrating improved activity tolerance today. Pt slow to mobilize, anxious about pain with mobility, however pt responds well with patience and encouragement. Pt required min a with LE's for scooting to EOB, min a X2 for transfer to seated EOB. Pt CGA for scooting to seated, able to maintain good self supported seated balance. First stand attempt mod a X2 with verbal cues for safety, hand placement and LE positioning; in stance pt with posterior lean and unable to attain standing balance with feet under COG.   Second attempt mod a X2, pt initially with posterior lean, using stepping strategy to position COG over TESSA, pt able to achieve good standing balance only requiring CGA for safety. Pt returned to sup with assist X2 for efficiency and safety. Progression toward goals: 
      Improving appropriately and progressing toward goals PLAN: 
Patient continues to benefit from skilled intervention to address the above impairments. Continue treatment per established plan of care. EDUCATION:  
Education:  Patient was educated on the following topics: goals of therapy Barriers to Learning/Limitations: None Compensate with: visual, verbal, tactile, kinesthetic cues/model Discharge Recommendations:  Matt Connolly Further Equipment Recommendations for Discharge:  TBD at next level of care Factors which may impact discharge planning: none SUBJECTIVE:  
Patient stated it only hurts when I move.  OBJECTIVE DATA SUMMARY:  
Critical Behavior: 
Neurologic State: Alert Orientation Level: Oriented X4 Cognition: Follows commands Safety/Judgement: Awareness of environment, Fall prevention G CODE:Mobility [id number] Current  CL= 60-79%   Goal  CJ= 20-39%. The severity rating is based on the Other [facility] Standing Balance Scale 
0: Pt performs 25% or less of standing activity (Max assist) CN, 100% impaired. 1: Pt supports self with upper extremities but requires therapist assistance. Pt performs 25-50% of effort (Mod assist) CM, 80% to <100% impaired. 1+: Pt supports self with upper extremities but requires therapist assistance. Pt performs >50% effort. (Min assist). CL, 60% to <80% impaired. 2: Pt supports self independently with both upper extremities (walker, crutches, parallel bars). CL, 60% to <80% impaired. 2+: Pt support self independently with 1 upper extremity (cane, crutch, 1 parallel bar). CK, 40% to <60% impaired. 3: Pt stands without upper extremity support for up to 30 seconds. CK, 40% to <60% impaired. 3+: Pt stands without upper extremity support for 30 seconds or greater. CJ, 20% to <40% impaired. 4: Pt independently moves and returns center of gravity 1-2 inches in one plane. CJ, 20% to <40% impaired. 4+: Pt independently moves and returns center of gravity 1-2 inches in multiple planes. CI, 1% to <20% impaired. 5: Pt independently moves and returns center of gravity in all planes greater than 2 inches. CH, 0% impaired. Functional Mobility: 
 
 
Functional Status Indep (I) Mod I Super-vision Min A Mod A Max A Total A Assist x2 Verbal cues Additional time Not tested Comments Rolling []  []  [] [x]    []    []  []  [x] [x] [x] [] Supine to sit []  []  [] [x]  []  []  []  [x] [x] [x] [] Sit to supine []  []  [] []  []  [x]  []  [x] [x] [x] [] Sit to stand []  []  [] []  [x]  []  []  [x] [x] [x] [] Stand to sit []  []  [] []  [x]  []  []  [x] [x] [x] [] Bed to chair transfers []  []  [] []  []  []  []  [] [] [] [x] Balance Good Cristina Daley Poor Unable Not tested Comments Sitting static [x]  []  []  []  [] Sitting dynamic [x]  []  []  []  []   
Standing static []  [x]  []  []  []   
Standing dynamic []  []  []  [x]  [] Vital Signs Temp: 99.1 °F (37.3 °C) Pulse (Heart Rate): 83    
BP: 123/72 Resp Rate: 17    
O2 Sat (%): 91 %Pain:Pre treatment pain level:2 Post treatment pain level:2Pain Scale 1: Numeric (0 - 10) Pain Intensity 1: 2 Pain Location 1: Leg 
Pain Orientation 1: Right Pain Description 1: Dull Pain Intervention(s) 1: Medication (see MAR) Activity Tolerance:  
Fair After treatment:  
Patient left in no apparent distress in bed Call bell left within reach Nursing notified Caregiver present Indio Gutierrez PTA Time Calculation: 24 mins

## 2018-12-31 NOTE — PROGRESS NOTES
Spoke with patient , wife and daughter in patient's room, Patient stated that he is going to surgery today. Patient and family unsure if they want SNF or hh at this time. Explained Medicare guidelines for rehab and the skilled need. Also  explain hh option as well. Patient will need PT evaluation post surgery to make discharge decision. SNF list has been given to family and will look through it to give at least three choices.

## 2018-12-31 NOTE — PROGRESS NOTES
Vascular Surgery Progress Note Admit Date: 2018 POD 3 Days Post-Op Procedure/Surgery:  Procedure(s): FEMORAL-POPLITEAL BYPASS Subjective:  
Pt slept more last pm. Pain RLE incisions improved. Ramirez out yesterday - with high post void residual - replaced mid morning today. Deneis R foot pain/ numbness, 'in fact it feels better'. Up out of bed with physical therapy -better mobility. Objective:  
 
Visit Vitals /71 Pulse 83 Temp 98 °F (36.7 °C) Resp 17 Ht 5' 8\" (1.727 m) Wt 61.2 kg (134 lb 14.7 oz) SpO2 95% BMI 20.51 kg/m² Temp (24hrs), Av.5 °F (36.9 °C), Min:98 °F (36.7 °C), Max:99.1 °F (37.3 °C) IV 75ml/hour. Heparin infusion 750units/hour. Physical Exam: 
GEN: A&Ox2, NAD unless moving R leg some pain with any movement. HEENT:PERRL EOMI, non icteric, moist membranes. NECK: no JVD, supple. LUNG: clear b/l and unlabored breathing, decreased bases. HEART: regular. ABD: soft, NT. 
EXT: RLE - incisions intact w/o drainage, R upper thigh somewhat firm but nontender remainder of incisions supple. R foot warm, hyperemia. PULSES: no longer has audible doppler signals R PT/DP. No graft pulse as well. - change from yesterday. Bedside duplex - no flow in the bypass graft. NEURO: no focal lateralizing deficits noted. Motor 5/5. Labs:  
Recent Results (from the past 24 hour(s)) CBC WITH AUTOMATED DIFF Collection Time: 18  2:45 PM  
Result Value Ref Range WBC 9.9 4.6 - 13.2 K/uL  
 RBC 3.32 (L) 4.70 - 5.50 M/uL  
 HGB 10.1 (L) 13.0 - 16.0 g/dL HCT 31.1 (L) 36.0 - 48.0 % MCV 93.7 74.0 - 97.0 FL  
 MCH 30.4 24.0 - 34.0 PG  
 MCHC 32.5 31.0 - 37.0 g/dL  
 RDW 14.0 11.6 - 14.5 % PLATELET 353 930 - 310 K/uL MPV 11.2 9.2 - 11.8 FL  
 NEUTROPHILS 82 (H) 40 - 73 % LYMPHOCYTES 7 (L) 21 - 52 % MONOCYTES 11 (H) 3 - 10 % EOSINOPHILS 0 0 - 5 % BASOPHILS 0 0 - 2 %  
 ABS. NEUTROPHILS 8.1 (H) 1.8 - 8.0 K/UL ABS. LYMPHOCYTES 0.7 (L) 0.9 - 3.6 K/UL  
 ABS. MONOCYTES 1.1 0.05 - 1.2 K/UL  
 ABS. EOSINOPHILS 0.0 0.0 - 0.4 K/UL  
 ABS. BASOPHILS 0.0 0.0 - 0.1 K/UL  
 DF AUTOMATED    
CBC WITH AUTOMATED DIFF Collection Time: 12/31/18  5:40 AM  
Result Value Ref Range WBC 10.0 4.6 - 13.2 K/uL  
 RBC 3.31 (L) 4.70 - 5.50 M/uL HGB 9.9 (L) 13.0 - 16.0 g/dL HCT 30.6 (L) 36.0 - 48.0 % MCV 92.4 74.0 - 97.0 FL  
 MCH 29.9 24.0 - 34.0 PG  
 MCHC 32.4 31.0 - 37.0 g/dL  
 RDW 14.1 11.6 - 14.5 % PLATELET 966 456 - 986 K/uL MPV 12.3 (H) 9.2 - 11.8 FL  
 NEUTROPHILS 81 (H) 40 - 73 % LYMPHOCYTES 7 (L) 21 - 52 % MONOCYTES 11 (H) 3 - 10 % EOSINOPHILS 1 0 - 5 % BASOPHILS 0 0 - 2 %  
 ABS. NEUTROPHILS 8.2 (H) 1.8 - 8.0 K/UL  
 ABS. LYMPHOCYTES 0.7 (L) 0.9 - 3.6 K/UL  
 ABS. MONOCYTES 1.1 0.05 - 1.2 K/UL  
 ABS. EOSINOPHILS 0.1 0.0 - 0.4 K/UL  
 ABS. BASOPHILS 0.0 0.0 - 0.1 K/UL  
 DF AUTOMATED    
PTT Collection Time: 12/31/18  5:40 AM  
Result Value Ref Range aPTT 56.3 (H) 23.0 - 36.4 SEC Assessment:  
 
Principal Problem: 
  Status post arterial stent (12/26/2018) Active Problems: 
  High cholesterol (12/26/2018) Hypertension (12/26/2018) Peripheral arterial disease ([facility] (12/26/2018) COPD (chronic obstructive pulmonary disease) ([facility] (12/26/2018) Insomnia (12/26/2018) POD#3 R fem bk pop vein bypass -appears occluded  - no rest pain, no numbness. Plan/Recommendations/Medical Decision Making:  
Heparin held - to OR for vein bypass exploration / thrombectomy, possible revision and possible angiogram.  Planned surgery and risks d/w pt and daughter - both agree to proceed. If vein compromised will not explore anastomotic areas and plan new cryovein in several days, also d/w pt and family. Risks include bleeding, infection, pain, additional surgery  - they agree to proceed. Consent signed by pt and daughter - on chart. Vaughn Reyes.  Uche Lawson MD 
 [facility] - [phone] December 31, 2018 
2:13 PM

## 2018-12-31 NOTE — WOUND CARE
Wound/Ostomy Nurse Progress Note Patient: Darcy Rodriguez NAW:[birth year] MRN: [id number] Situation: Wound consult for gluteal cleft wound. Background: Patient admitted to [facility] on 12/26/18. He is s/p right fem/pop bypass on 12/28/18. An open wound was noted on his gluteal cleft. Assessment: Patient was laying in bed, awake and alert, when wound care arrived for consult. He was cooperative with a wound assessment. He was assisted to turn on his on his left side. He has an open wound on his gluteal cleft. He states \"That's a pilonidal cyst. I've had it for years. \"  The wound is open and measures 2.4 x 1.5 cm. The base is yellow and pink and fibrous. There is no slough or s/s of infection. Patient states \"When I'm at home I soak it every day. \"  Advised patient that he would benefit from focused wound care in an outpatient clinic to heal the wound. A dressing of Aquacel Ag and a Mepilex bordered foam was applied. Recommendation: Continue with Aquacel Ag and Mepilex every other day. This area is very susceptible to pressure injury as it is already open. The patient was encouraged to re-position frequently while he is in the bed to off-load pressure from the sacrum.

## 2018-12-31 NOTE — PROGRESS NOTES
Family noted in hallway yelling, visually upset and argumentative with staff stating, \"You need to get my mother in a home tonight or I'm calling the police\". Informed family since the mother is not a patient staff is unable to intervene. Security and nursing supervisor called to floor due to aggression and demands from son.

## 2018-12-31 NOTE — PERIOP NOTES
TRANSFER - IN REPORT: 
 
Verbal report received from Baljit Munoz RN on Federico Acron  being received from Rm. [id number] for ordered procedure Report consisted of patients Situation, Background, Assessment and  
Recommendations(SBAR). Information from the following report(s) Kardex, OR Summary, Procedure Summary, MAR, Recent Results, Med Rec Status, Pre Procedure Checklist and Procedure Verification was reviewed with the receiving nurse. Opportunity for questions and clarification was provided. Assessment completed upon patients arrival to unit and care assumed.

## 2018-12-31 NOTE — PROGRESS NOTES
Assisted Primary RN with combative family. Daughter made an attempt to \"pay me\" to take great care of her father. She was informed that I will not accept that and that paying any medical professional is not an action that is accepted. We as the staff here on the unit will take great care of the patient because it is our passion and what we are here to do. Daughter left after this conversation and escorted her Mother home. She was given the main number to the unit to call and check on the patient. Primary RN and Charge Nurse made aware of the situation and conversation.

## 2018-12-31 NOTE — PROGRESS NOTES
Bedside shift report received from JAMAR Camargo with sbar 0830 medicated for pain  in to see patient, patient made Npo Ramirez inserted per order, bladder scan was 502  in to see patient,patient willgo to surgery today, consent signed

## 2019-01-01 PROBLEM — I95.81 POSTPROCEDURAL HYPOTENSION: Status: ACTIVE | Noted: 2019-01-01

## 2019-01-01 LAB
ABO + RH BLD: NORMAL
ANION GAP SERPL CALC-SCNC: 8 MMOL/L (ref 3–18)
APTT PPP: 37.2 SEC (ref 23–36.4)
ATRIAL RATE: 65 BPM
BLD PROD TYP BPU: NORMAL
BLD PROD TYP BPU: NORMAL
BLOOD GROUP ANTIBODIES SERPL: NORMAL
BPU ID: NORMAL
BPU ID: NORMAL
BUN SERPL-MCNC: 24 MG/DL (ref 7–18)
BUN/CREAT SERPL: 33 (ref 12–20)
CALCIUM SERPL-MCNC: 8.1 MG/DL (ref 8.5–10.1)
CALCULATED P AXIS, ECG09: 15 DEGREES
CALCULATED R AXIS, ECG10: 59 DEGREES
CALCULATED T AXIS, ECG11: 36 DEGREES
CALLED TO:,BCALL1: NORMAL
CHLORIDE SERPL-SCNC: 102 MMOL/L (ref 100–108)
CK MB CFR SERPL CALC: 0.4 % (ref 0–4)
CK MB SERPL-MCNC: 4.5 NG/ML (ref 5–25)
CK SERPL-CCNC: 1089 U/L (ref 39–308)
CO2 SERPL-SCNC: 26 MMOL/L (ref 21–32)
CREAT SERPL-MCNC: 0.72 MG/DL (ref 0.6–1.3)
CROSSMATCH RESULT,%XM: NORMAL
CROSSMATCH RESULT,%XM: NORMAL
DIAGNOSIS, 93000: NORMAL
ERYTHROCYTE [DISTWIDTH] IN BLOOD BY AUTOMATED COUNT: 14 % (ref 11.6–14.5)
GLUCOSE SERPL-MCNC: 138 MG/DL (ref 74–99)
HCT VFR BLD AUTO: 31 % (ref 36–48)
HGB BLD-MCNC: 10 G/DL (ref 13–16)
MCH RBC QN AUTO: 30.1 PG (ref 24–34)
MCHC RBC AUTO-ENTMCNC: 32.3 G/DL (ref 31–37)
MCV RBC AUTO: 93.4 FL (ref 74–97)
P-R INTERVAL, ECG05: 130 MS
PLATELET # BLD AUTO: 216 K/UL (ref 135–420)
PMV BLD AUTO: 11.5 FL (ref 9.2–11.8)
POTASSIUM SERPL-SCNC: 4.8 MMOL/L (ref 3.5–5.5)
Q-T INTERVAL, ECG07: 374 MS
QRS DURATION, ECG06: 82 MS
QTC CALCULATION (BEZET), ECG08: 388 MS
RBC # BLD AUTO: 3.32 M/UL (ref 4.7–5.5)
SODIUM SERPL-SCNC: 136 MMOL/L (ref 136–145)
SPECIMEN EXP DATE BLD: NORMAL
STATUS OF UNIT,%ST: NORMAL
STATUS OF UNIT,%ST: NORMAL
TROPONIN I SERPL-MCNC: 0.03 NG/ML (ref 0–0.04)
UNIT DIVISION, %UDIV: 0
UNIT DIVISION, %UDIV: 0
VENTRICULAR RATE, ECG03: 65 BPM
WBC # BLD AUTO: 9.5 K/UL (ref 4.6–13.2)

## 2019-01-01 PROCEDURE — 85730 THROMBOPLASTIN TIME PARTIAL: CPT

## 2019-01-01 PROCEDURE — 74011250637 HC RX REV CODE- 250/637: Performed by: SURGERY

## 2019-01-01 PROCEDURE — 65610000006 HC RM INTENSIVE CARE

## 2019-01-01 PROCEDURE — 85027 COMPLETE CBC AUTOMATED: CPT

## 2019-01-01 PROCEDURE — 74011250637 HC RX REV CODE- 250/637: Performed by: HOSPITALIST

## 2019-01-01 PROCEDURE — 94640 AIRWAY INHALATION TREATMENT: CPT

## 2019-01-01 PROCEDURE — 77030018719 HC DRSG PTCH ANTIMIC J&J -A

## 2019-01-01 PROCEDURE — 82550 ASSAY OF CK (CPK): CPT

## 2019-01-01 PROCEDURE — 80048 BASIC METABOLIC PNL TOTAL CA: CPT

## 2019-01-01 PROCEDURE — 74011250636 HC RX REV CODE- 250/636: Performed by: SURGERY

## 2019-01-01 PROCEDURE — 74011000250 HC RX REV CODE- 250: Performed by: HOSPITALIST

## 2019-01-01 PROCEDURE — 97530 THERAPEUTIC ACTIVITIES: CPT

## 2019-01-01 PROCEDURE — 36415 COLL VENOUS BLD VENIPUNCTURE: CPT

## 2019-01-01 RX ORDER — DOPAMINE HYDROCHLORIDE 160 MG/100ML
0-15 INJECTION, SOLUTION INTRAVENOUS
Status: DISCONTINUED | OUTPATIENT
Start: 2019-01-01 | End: 2019-01-02

## 2019-01-01 RX ADMIN — SODIUM CHLORIDE 75 ML/HR: 900 INJECTION, SOLUTION INTRAVENOUS at 03:42

## 2019-01-01 RX ADMIN — OXYCODONE AND ACETAMINOPHEN 2 TABLET: 5; 325 TABLET ORAL at 08:48

## 2019-01-01 RX ADMIN — ROSUVASTATIN CALCIUM 40 MG: 20 TABLET, FILM COATED ORAL at 21:05

## 2019-01-01 RX ADMIN — FAMOTIDINE 20 MG: 20 TABLET ORAL at 08:48

## 2019-01-01 RX ADMIN — DOPAMINE HYDROCHLORIDE 2.5 MCG/KG/MIN: 160 INJECTION, SOLUTION INTRAVENOUS at 21:08

## 2019-01-01 RX ADMIN — CLINDAMYCIN IN 5 PERCENT DEXTROSE 600 MG: 12 INJECTION, SOLUTION INTRAVENOUS at 06:35

## 2019-01-01 RX ADMIN — BUDESONIDE 500 MCG: 0.5 INHALANT RESPIRATORY (INHALATION) at 20:35

## 2019-01-01 RX ADMIN — ASPIRIN 81 MG 81 MG: 81 TABLET ORAL at 08:47

## 2019-01-01 RX ADMIN — ARFORMOTEROL TARTRATE 15 MCG: 15 SOLUTION RESPIRATORY (INHALATION) at 20:35

## 2019-01-01 RX ADMIN — Medication 10 ML: at 21:33

## 2019-01-01 RX ADMIN — FAMOTIDINE 20 MG: 20 TABLET ORAL at 19:05

## 2019-01-01 RX ADMIN — Medication 10 ML: at 05:07

## 2019-01-01 RX ADMIN — Medication 10 ML: at 14:00

## 2019-01-01 RX ADMIN — ZOLPIDEM TARTRATE 5 MG: 5 TABLET ORAL at 21:05

## 2019-01-01 RX ADMIN — SODIUM CHLORIDE 25 ML/HR: 900 INJECTION, SOLUTION INTRAVENOUS at 21:33

## 2019-01-01 RX ADMIN — OXYCODONE AND ACETAMINOPHEN 2 TABLET: 5; 325 TABLET ORAL at 17:56

## 2019-01-01 RX ADMIN — ESCITALOPRAM OXALATE 5 MG: 10 TABLET ORAL at 08:47

## 2019-01-01 NOTE — PROGRESS NOTES
2015: assumed care of pt from [facility], [address] from PACU. Pt A&O x4, Vs stable on dopamine gtt. Dual skin assessment completed with Barbara Andrew RN; wound on gluteal cleft--being seen by wound care, mepilex in place. Absent pulses on RLE; per PACU nurse, vascular is aware. Will continue to monitor. 0200: pt refusing to turn. Education provided. 0400: assessment completed. pt refusing to turn. Education provided. 0600: pt refusing to turn. Education provided. 0700: Bedside shift change report given to Claude Faustin RN (oncoming nurse) by Moo Bond RN 
 (offgoing nurse). Report included the following information SBAR, Procedure Summary, Intake/Output, MAR and Recent Results.

## 2019-01-01 NOTE — PROGRESS NOTES
PT treatment session attempted x 2: 6583, patient BP low 110/38. 1043: Patient's BP 96/50. Will hold PT session and f/u with patient on 1/2/19. Derek Zuniga PT, DPT Office extension: [phone] Pager #: [phone]

## 2019-01-01 NOTE — PROGRESS NOTES
Speech Therapy Screening: 
Services are not indicated at this time. An InBasket screening referral was triggered for speech therapy based on results obtained during the nursing admission assessment. The patients chart was reviewed and the patient is not appropriate for a skilled therapy evaluation at this time. Please consult speech therapy if any therapy needs arise. Thank you.  
 
Sergio Norris, SLP

## 2019-01-01 NOTE — PROGRESS NOTES
Holding OT re-evaluation for today due episodes of hypotension this AM (85/37; 86/36; 93/42). Will follow up on 1/2/2019. Thank you. Farhat Potter MS OTR/L Office Ext: [phone] Pager: [phone]

## 2019-01-01 NOTE — PROGRESS NOTES
1420: After Dr. Montana Ramirez came in to see patient, he stated that he would like the patient to be ambulated and see PT/OT today. Notes from PT/OR today state that they did not see patient d/t hypotension, PT and OT are paged to update on the situation. 1730: Right surgical site dressings x2 changed under sterile technique, verbal order from Dr Montana Ramirez previously today.

## 2019-01-01 NOTE — PROGRESS NOTES
Vascular Surgery Progress Note Admit Date: 2018 POD 1 Day Post-Op Procedure/Surgery:  Procedure(s): FEMORAL-POPLITEAL BYPASS  POSSIBLE THROMBECTOMY POSSIBLE ANGIO Subjective:  
Pt alert, pain RLE much improved. Dopamine continues. Objective:  
 
Visit Vitals /42 Pulse 64 Temp 98.1 °F (36.7 °C) Resp 15 Ht 5' 8\" (1.727 m) Wt 61.2 kg (134 lb 14.7 oz) SpO2 96% BMI 20.51 kg/m² Temp (24hrs), Av.8 °F (37.1 °C), Min:98 °F (36.7 °C), Max:100 °F (37.8 °C) IV 75/hr - Urine output last shift 1600ml RENUKA 12 ml but has another 20ml this am. Serosang. Physical Exam: 
GEN: A&Ox3, NAD, comfortable. HEENT:PERRL EOMI, non icteric, moist membranes. NECK: no JVD, supple LUNG: clear b/l and unlabored breathing HEART: regular ABD: soft, NT 
EXT: RLE - incisions all intact, lower leg dressing serous drainage, Lower R ankle/foot less dark (resolving ecchymosis), R foot warm / perfused - R fem pulse palp, no audible doppler signals R DP/PT however R foot warm  - no numbness/pain. NEURO: no focal lateralizing deficits noted. Motor 5/5. Labs:  
Recent Results (from the past 24 hour(s)) EKG, 12 LEAD, INITIAL Collection Time: 18  8:02 PM  
Result Value Ref Range Ventricular Rate 65 BPM  
 Atrial Rate 65 BPM  
 P-R Interval 130 ms QRS Duration 82 ms Q-T Interval 374 ms QTC Calculation (Bezet) 388 ms Calculated P Axis 15 degrees Calculated R Axis 59 degrees Calculated T Axis 36 degrees Diagnosis Normal sinus rhythm Nonspecific ST abnormality Abnormal ECG No previous ECGs available Confirmed by Mariajose Lipscomb ([id number]) on 2019 10:46:56 AM 
  
HGB & HCT Collection Time: 18  9:00 PM  
Result Value Ref Range HGB 10.9 (L) 13.0 - 16.0 g/dL HCT 33.5 (L) 36.0 - 48.0 % CARDIAC PANEL,(CK, CKMB & TROPONIN) Collection Time: 18  9:00 PM  
Result Value Ref Range CK 1,246 (H) 39 - 308 U/L  
 CK - MB 4.8 (H) <3.6 ng/ml CK-MB Index 0.4 0.0 - 4.0 % Troponin-I, QT 0.04 0.0 - 0.045 NG/ML  
PTT Collection Time: 01/01/19  2:40 AM  
Result Value Ref Range aPTT 37.2 (H) 23.0 - 36.4 SEC  
CBC W/O DIFF Collection Time: 01/01/19  2:40 AM  
Result Value Ref Range WBC 9.5 4.6 - 13.2 K/uL  
 RBC 3.32 (L) 4.70 - 5.50 M/uL  
 HGB 10.0 (L) 13.0 - 16.0 g/dL HCT 31.0 (L) 36.0 - 48.0 % MCV 93.4 74.0 - 97.0 FL  
 MCH 30.1 24.0 - 34.0 PG  
 MCHC 32.3 31.0 - 37.0 g/dL  
 RDW 14.0 11.6 - 14.5 % PLATELET 906 658 - 040 K/uL MPV 11.5 9.2 - 50.8 FL  
METABOLIC PANEL, BASIC Collection Time: 01/01/19  2:40 AM  
Result Value Ref Range Sodium 136 136 - 145 mmol/L Potassium 4.8 3.5 - 5.5 mmol/L Chloride 102 100 - 108 mmol/L  
 CO2 26 21 - 32 mmol/L Anion gap 8 3.0 - 18 mmol/L Glucose 138 (H) 74 - 99 mg/dL BUN 24 (H) 7.0 - 18 MG/DL Creatinine 0.72 0.6 - 1.3 MG/DL  
 BUN/Creatinine ratio 33 (H) 12 - 20 GFR est AA >60 >60 ml/min/1.73m2 GFR est non-AA >60 >60 ml/min/1.73m2 Calcium 8.1 (L) 8.5 - 10.1 MG/DL  
CARDIAC PANEL,(CK, CKMB & TROPONIN) Collection Time: 01/01/19  2:40 AM  
Result Value Ref Range CK 1,089 (H) 39 - 308 U/L  
 CK - MB 4.5 (H) <3.6 ng/ml CK-MB Index 0.4 0.0 - 4.0 % Troponin-I, QT 0.03 0.0 - 0.045 NG/ML Assessment:  
 
Principal Problem: 
  Status post arterial stent (12/26/2018) Active Problems: 
  High cholesterol (12/26/2018) Hypertension (12/26/2018) [facility] (12/26/2018) COPD (chronic obstructive pulmonary disease) ([facility] (12/26/2018) Insomnia (12/26/2018) 
 
12/28/18 R femBKpop nonreversed vein bypass -  
12/41/18 R LE bypass exploration - occluded non repairable. RLE perfused w/o rest pain or neurologic changes. Plan/Recommendations/Medical Decision Making:  
Persistent intermittent hypotension - on/off dopamine last pm and this am - normal troponins, adequate hydration. D/c lexopro - was not taking prior to admission. OOB chair/ambulate - Physical therapy D/c noriega in am 1/2/18- urinary retention yesterday. Most likely d/c RENUKA drain tomorrow. Occluded R pop artery and appears to have adequate collateral flow to the R foot. Tentatively plan R fem to BK pop cryovein bypass - however, if adequately ambulates - consider holding on repeat bypass, base decision on clinical findings next several days. Mira Spurling. [address] [facility] - [phone] January 1, 2019 11:45 AM

## 2019-01-01 NOTE — PROGRESS NOTES
9570: PT treatment session held d/t low BP. Will f/u with patient. Pam Cordero PT, DPT Office extension: [phone] Pager #: [phone]

## 2019-01-01 NOTE — PROGRESS NOTES
Problem: Falls - Risk of 
Goal: *Absence of Falls Document Иван Macleod Fall Risk and appropriate interventions in the flowsheet. Outcome: Progressing Towards Goal 
Fall Risk Interventions: 
Mobility Interventions: Strengthening exercises (ROM-active/passive) Medication Interventions: Patient to call before getting OOB Elimination Interventions: Call light in reach, Patient to call for help with toileting needs Problem: Pressure Injury - Risk of 
Goal: *Prevention of pressure injury Document Gee Scale and appropriate interventions in the flowsheet. Outcome: Progressing Towards Goal 
Pressure Injury Interventions: 
Sensory Interventions: Assess need for specialty bed, Check visual cues for pain, Float heels, Keep linens dry and wrinkle-free, Maintain/enhance activity level, Minimize linen layers Moisture Interventions: Apply protective barrier, creams and emollients, Absorbent underpads Activity Interventions: Assess need for specialty bed, Increase time out of bed, Pressure redistribution bed/mattress(bed type) Mobility Interventions: PT/OT evaluation, Pressure redistribution bed/mattress (bed type) Nutrition Interventions: Document food/fluid/supplement intake Friction and Shear Interventions: HOB 30 degrees or less, Lift sheet

## 2019-01-01 NOTE — PROGRESS NOTES
At approximately 0930 this morning,  revisited Luis Pitts, who is a 74 y. o.,male, who had been moved to ICU since we met two days ago. Patient was alert and oriented. His wife, daughter and one of his two sons were at bedside. Patient's daughter let me know that she had not yet had a chance to discuss the Advance Medical Directive (AMD) with her father 
that I left with them two days ago. While I was still in the unit, daughter Celeste Pollock asked to speak with me. She informed me that her mother has dementia and asked for any assistance I might be able to offer as resources to help her know where to begin getting her mother the help SHE needs. Having been around her mother since her own arrival from [place], she has come to realize that her father will no longer be able to care for her mother has he has been doing.  offered daughter support, comfort, and encouragement, as well as a couple of resources for her to investigate. Later, at [phone], I was paged by ICU RN Janie Harada, who relayed that [facility] wished me to return to the unit. When I arrived, she stated that her father was ready to sign his Advance Medical Directive, which they had filled out together. After questioning the patient concerning his advance medical wishes and determining that he was oriented X 3 (confirmed by his nurse), the document was signed and witnessed. Copies were made for patient, his Medical [facility] of , as well as for his medical record. The  provided the following Interventions: 
Assisted the patient in the execution of his Advance Medical Directive. Placed a copy of the executed [address] in the patients paper chart and returned the original  
         and copies to the patient and his daughter. The following outcomes were achieved: 
Patient was able to state date, location, and name. Patient executed a [address], completing Section I:    Appointment and [place] Joyce of My Agent; Section II: My Health Care Instruction; and did not wish to complete Section III: Anatomical Gifts. Patient and his family expressed gratitude the 's visit and assistance. Assessment: 
There are no further spiritual or Jainism issues which require Spiritual Care Services interventions at this time. Plan: 
Chaplains will continue to follow and will provide pastoral care on an as needed/requested basis. [facility] 
Board Certified  [facility]  
[phone]

## 2019-01-01 NOTE — PROGRESS NOTES
[address] [place], [place] [address] Hospitalist Division Daily progress Note Patient: Nini De Anda MRN: [id number]  CSN: [id number] YOB: 1945  Age: 74 y.o. Sex: male DOA: 12/26/2018 LOS:  LOS: 6 days Assessment/Plan Principal Problem: 
  Status post arterial stent (12/26/2018) Active Problems: 
  High cholesterol (12/26/2018) Hypertension (12/26/2018) Peripheral arterial disease ([facility] [address] (12/26/2018) COPD (chronic obstructive pulmonary disease) ([facility] [address] (12/26/2018) Insomnia (12/26/2018) Postprocedural hypotension (1/1/2019) - Diet and mobilization per primary team 
- Pain control PRN 
- PT/OT 
- Cont heparin gtt - Hypotension - unclear etiology, Hgb stable; remains on dopamine, d/c losartan for now - Discussed with Dr. Dennie Gula likely d/c RENUKA drain tomorrow. Tentatively plan R fem to BK pop cryovein bypass - however, if adequately ambulates - vascular surgery team to consider holding on repeat bypass, decision to be based on clinical findings next several days. - Cont acceptable home medications for chronic conditions  
- DVT protocol 
  
I have personally reviewed all pertinent labs and films that have officially resulted over the last 24 hours. I have personally checked for all pending labs that are awaiting final results. Subjective / Interval History:  
 
CC: 
Patient is 1 day post-op Fem-Pop bypass. No confusion, did not require haldol. Pt is alert, watching show on his laptop. He has been hypotensive since this AM and dopamine was restarted. Losartan was held. Discussed case with Dr. Collene Kanner in vascular surgery. Objective:  
  
 
Visit Vitals BP 94/71 Pulse 76 Temp 98.2 °F (36.8 °C) Resp 17 Ht 5' 8\" (1.727 m) Wt 61.2 kg (134 lb 14.7 oz) SpO2 100% BMI 20.51 kg/m² Physical Exam: 
Visit Vitals BP 94/71 Pulse 76 Temp 98.2 °F (36.8 °C) Resp 17  
 Ht 5' 8\" (1.727 m) Wt 61.2 kg (134 lb 14.7 oz) SpO2 100% BMI 20.51 kg/m² General appearance: alert, cooperative, no distress, appears stated age HEENT: negative Neck: supple, symmetrical, trachea midline, no adenopathy, thyroid: not enlarged, symmetric, no tenderness/mass/nodules, no carotid bruit and no JVD Lungs: clear to auscultation bilaterally Heart: regular rate and rhythm, S1, S2 normal, no murmur, click, rub or gallop Abdomen: soft, non-tender. Bowel sounds normal. No masses,  no organomegaly Skin: skin discoloration R leg & foot - dusky & bluish Ext: R foot warm, serous drainage in RENUKA Intake and Output: 
Current Shift:  01/01 0701 - 01/01 1900 In: 562.7 [I.V.:562.7] Out: - Last three shifts:  12/30 1901 - 01/01 0700 In: 1514.3 [I.V.:1514.3] Out: 9356 [Urine:3075; Drains:12] Recent Results (from the past 24 hour(s)) EKG, 12 LEAD, INITIAL Collection Time: 12/31/18  8:02 PM  
Result Value Ref Range Ventricular Rate 65 BPM  
 Atrial Rate 65 BPM  
 P-R Interval 130 ms QRS Duration 82 ms Q-T Interval 374 ms QTC Calculation (Bezet) 388 ms Calculated P Axis 15 degrees Calculated R Axis 59 degrees Calculated T Axis 36 degrees Diagnosis Normal sinus rhythm Nonspecific ST abnormality Abnormal ECG No previous ECGs available Confirmed by Tomi Wilson ([id number]) on 1/1/2019 10:46:56 AM 
  
HGB & HCT Collection Time: 12/31/18  9:00 PM  
Result Value Ref Range HGB 10.9 (L) 13.0 - 16.0 g/dL HCT 33.5 (L) 36.0 - 48.0 % CARDIAC PANEL,(CK, CKMB & TROPONIN) Collection Time: 12/31/18  9:00 PM  
Result Value Ref Range CK 1,246 (H) 39 - 308 U/L  
 CK - MB 4.8 (H) <3.6 ng/ml CK-MB Index 0.4 0.0 - 4.0 % Troponin-I, QT 0.04 0.0 - 0.045 NG/ML  
PTT Collection Time: 01/01/19  2:40 AM  
Result Value Ref Range aPTT 37.2 (H) 23.0 - 36.4 SEC  
CBC W/O DIFF Collection Time: 01/01/19  2:40 AM  
Result Value Ref Range WBC 9.5 4.6 - 13.2 K/uL  
 RBC 3.32 (L) 4.70 - 5.50 M/uL  
 HGB 10.0 (L) 13.0 - 16.0 g/dL HCT 31.0 (L) 36.0 - 48.0 % MCV 93.4 74.0 - 97.0 FL  
 MCH 30.1 24.0 - 34.0 PG  
 MCHC 32.3 31.0 - 37.0 g/dL  
 RDW 14.0 11.6 - 14.5 % PLATELET 696 033 - 574 K/uL MPV 11.5 9.2 - 78.5 FL  
METABOLIC PANEL, BASIC Collection Time: 01/01/19  2:40 AM  
Result Value Ref Range Sodium 136 136 - 145 mmol/L Potassium 4.8 3.5 - 5.5 mmol/L Chloride 102 100 - 108 mmol/L  
 CO2 26 21 - 32 mmol/L Anion gap 8 3.0 - 18 mmol/L Glucose 138 (H) 74 - 99 mg/dL BUN 24 (H) 7.0 - 18 MG/DL Creatinine 0.72 0.6 - 1.3 MG/DL  
 BUN/Creatinine ratio 33 (H) 12 - 20 GFR est AA >60 >60 ml/min/1.73m2 GFR est non-AA >60 >60 ml/min/1.73m2 Calcium 8.1 (L) 8.5 - 10.1 MG/DL  
CARDIAC PANEL,(CK, CKMB & TROPONIN) Collection Time: 01/01/19  2:40 AM  
Result Value Ref Range CK 1,089 (H) 39 - 308 U/L  
 CK - MB 4.5 (H) <3.6 ng/ml CK-MB Index 0.4 0.0 - 4.0 % Troponin-I, QT 0.03 0.0 - 0.045 NG/ML Current Facility-Administered Medications:  
  famotidine (PEPCID) tablet 20 mg, 20 mg, Oral, BID, Laurita Taylor MD, 20 mg at 01/01/19 0848 
  0.9% sodium chloride infusion, 25 mL/hr, IntraVENous, CONTINUOUS, Southern, Sharona Victor MD, Last Rate: 25 mL/hr at 01/01/19 1144, 25 mL/hr at 01/01/19 1144   DOPamine (INTROPIN) 400 mg in dextrose 5% 250 mL infusion, 0-15 mcg/kg/min, IntraVENous, TITRATE, Southern, Sharona Victor MD, Last Rate: 6.9 mL/hr at 01/01/19 1126, 3 mcg/kg/min at 01/01/19 1126 
  haloperidol lactate (HALDOL) injection 2 mg, 2 mg, IntraVENous, Q2H PRN, Ceasrio Chua DO, 2 mg at 12/30/18 0324 
  oxyCODONE-acetaminophen (PERCOCET) 5-325 mg per tablet 1-2 Tab, 1-2 Tab, Oral, Q4H PRN, Oliverio Ye MD, 2 Tab at 01/01/19 0897 
  HYDROmorphone (DILAUDID) syringe 0.5-1 mg, 0.5-1 mg, IntraVENous, Q2H PRN, Oliverio Ye MD, 1 mg at 12/30/18 2891   aspirin chewable tablet 81 mg, 81 mg, Oral, DAILY, Oscar Tapia MD, 81 mg at 01/01/19 0847 
  zolpidem (AMBIEN) tablet 5 mg, 5 mg, Oral, QHS PRN, Oscar Tapia MD, 5 mg at 12/31/18 2304   melatonin tablet 1.5 mg, 1.5 mg, Oral, QHS PRN, Oscar Tapia MD 
  0.9% sodium chloride infusion 250 mL, 250 mL, IntraVENous, PRN, Rhett Wilson MD 
  sodium chloride (NS) flush 5-10 mL, 5-10 mL, IntraVENous, Q8H, Laurita Taylor MD, 10 mL at 01/01/19 1400 
  sodium chloride (NS) flush 5-10 mL, 5-10 mL, IntraVENous, PRN, Brandon, aBrbara Kim MD, 10 mL at 12/28/18 2109   acetaminophen (TYLENOL) tablet 650 mg, 650 mg, Oral, Q4H PRN, Barbara Taylor MD 
  naloxone [facility]) injection 0.4 mg, 0.4 mg, IntraVENous, PRN, Barbara Taylor MD 
  ondansetron (ZOFRAN) injection 4 mg, 4 mg, IntraVENous, Q4H PRN, Barbara Taylor MD 
  diazePAM (VALIUM) tablet 5 mg, 5 mg, Oral, Q8H PRN, Meryle Mccune, DO 
  rosuvastatin (CRESTOR) tablet 40 mg, 40 mg, Oral, QHS, Gilberto Raymond H, DO, 40 mg at 12/31/18 2304   losartan (COZAAR) tablet 100 mg, 100 mg, Oral, DAILY, Mertoro Roldan DO, Stopped at 01/01/19 0900   budesonide (PULMICORT) 500 mcg/2 ml nebulizer suspension, 500 mcg, Nebulization, BID RT, Mertoro Roldan, DO, 500 mcg at 12/31/18 2053   arformoterol (BROVANA) neb solution 15 mcg, 15 mcg, Nebulization, BID RT, Mertoro Roldan, DO, 15 mcg at 12/31/18 2053 Lab Results Component Value Date/Time  Glucose 138 (H) 01/01/2019 02:40 AM  
 Glucose 118 (H) 12/29/2018 01:15 AM  
 Glucose 89 12/26/2018 02:05 PM  
 Glucose 90 05/19/2015 12:49 PM  
  
 
 
 
 
 
 
Reece Christiansen MD 
1/1/2019, 4:42 PM

## 2019-01-01 NOTE — PROGRESS NOTES
Problem: Mobility Impaired (Adult and Pediatric) Goal: *Acute Goals and Plan of Care (Insert Text) Physical Therapy Goals Initiated 12/30/2018 and to be accomplished within 7 day(s) 1. Patient will move from supine to sit and sit to supine , scoot up and down and roll side to side in bed with minimal assistance/contact guard assist.    
2.  Patient will transfer from bed to chair and chair to bed with minimal assistance/contact guard assist using the least restrictive device. 3.  Patient will perform sit to stand with minimal assistance/contact guard assist. 
4.  Patient will ambulate with minimal assistance/contact guard assist for >/= 75 feet with the least restrictive device. 5.  Patient will demonstrate independence with performance of home exercise program. 
  
Outcome: Progressing Towards Goal 
 
PHYSICAL THERAPY: Daily TREATMENT Note INPATIENT: Medicare: Hospital Day: 7 Patient: Magalis Gardner (15 y.o. male)    Date: 1/1/2019 Primary Diagnosis: Status post arterial stent Procedure(s) (LRB): FEMORAL-POPLITEAL BYPASS  POSSIBLE THROMBECTOMY POSSIBLE ANGIO (Left), 1 Day Post-Op, Precautions:   
 
Chart, physical therapy assessment, plan of care and goals were reviewed. PLOF: Independent ASSESSMENT: 
Patient supine in bed, agreeable to participation with PT. Family present. /59 MOD A x 1 for supine > sit; patient demo's good sitting balance. Sit <> stand x 2 trials with MAX A x 1. Patient tolerates standing < 1 minute during first trial d/t c/o back pain. Encouraged deep breathing and second attempt to promote OOB mobility. Able to tolerates standing > 1 minute during second trial with cuing for deep breathing to improve activity tolerance. Gait deferred d/t poor standing tolerance. Patient returned to bed, able to scoot towards [facility]. MAX A x 1 to return to bed. Left with bed in chair position.  Encouraged to sit EOB for meals with assist from nursing to promote mobility. Patient left with all needs within reach. No c/o of R LE pain with mobility. BP at end of session 128/37. Progression toward goals: 
      Improving slowly and progressing toward goals PLAN: 
Patient continues to benefit from skilled intervention to address the above impairments. Continue treatment per established plan of care. EDUCATION:  
Education:  Patient was educated on the following topics: bed mobility, transfers. Verbalizes understanding. Barriers to Learning/Limitations: None Compensate with: visual, verbal, tactile, kinesthetic cues/model Discharge Recommendations:  Matt Connolly Further Equipment Recommendations for Discharge:  rolling walker Factors which may impact discharge planning: N/A  
 
SUBJECTIVE:  
Patient stated My back hurts.  OBJECTIVE DATA SUMMARY:  
Critical Behavior: 
Neurologic State: Alert Orientation Level: Oriented X4 Cognition: Follows commands Safety/Judgement: Awareness of environment, Fall prevention G CODE:Mobility [id number] Current  CL= 60-79%   Goal  CK= 40-59%. The severity rating is based on the Other MOD A - MAX A for bed mobility, transfers. Functional Mobility: 
 
 
Functional Status Indep (I) Mod I Super-vision Min A Mod A Max A Total A Assist x2 Verbal cues Additional time Not tested Comments Rolling []  []  [] []    []    []  []  [] [] [] [x] Supine to sit []  []  [] []  [x]  []  []  [] [] [] [] Sit to supine []  []  [] []  []  [x]  []  [] [] [] [] Sit to stand []  []  [] []  []  [x]  []  [] [] [] []   
Stand to sit []  []  [] [x]  []  []  []  [] [] [] [] Bed to chair transfers []  []  [] []  []  []  []  [] [] [] [x] Balance Good Clarice Higashi Poor Unable Not tested Comments Sitting static [x]  []  []  []  [] Sitting dynamic [x]  []  []  []  []   
Standing static []  [x]  []  []  []   
Standing dynamic []  []  []  [x]  [] Vital Signs Temp: 98.2 °F (36.8 °C) Pulse (Heart Rate): 76 BP: 94/71 Resp Rate: 17    
O2 Sat (%): 100 % Pain:None Activity Tolerance: Fair After treatment:  
Patient left in no apparent distress sitting up in chair Call bell left within reach Nursing notified Jennifer Tellez Time Calculation: 24 mins

## 2019-01-02 LAB
ANION GAP SERPL CALC-SCNC: 4 MMOL/L (ref 3–18)
BASOPHILS # BLD: 0 K/UL (ref 0–0.1)
BASOPHILS NFR BLD: 0 % (ref 0–2)
BUN SERPL-MCNC: 32 MG/DL (ref 7–18)
BUN/CREAT SERPL: 39 (ref 12–20)
CALCIUM SERPL-MCNC: 8.2 MG/DL (ref 8.5–10.1)
CHLORIDE SERPL-SCNC: 105 MMOL/L (ref 100–108)
CO2 SERPL-SCNC: 28 MMOL/L (ref 21–32)
CREAT SERPL-MCNC: 0.83 MG/DL (ref 0.6–1.3)
DIFFERENTIAL METHOD BLD: ABNORMAL
EOSINOPHIL # BLD: 0.1 K/UL (ref 0–0.4)
EOSINOPHIL NFR BLD: 2 % (ref 0–5)
ERYTHROCYTE [DISTWIDTH] IN BLOOD BY AUTOMATED COUNT: 14.1 % (ref 11.6–14.5)
GLUCOSE SERPL-MCNC: 119 MG/DL (ref 74–99)
HCT VFR BLD AUTO: 29.8 % (ref 36–48)
HGB BLD-MCNC: 9.5 G/DL (ref 13–16)
LYMPHOCYTES # BLD: 1.4 K/UL (ref 0.9–3.6)
LYMPHOCYTES NFR BLD: 15 % (ref 21–52)
MCH RBC QN AUTO: 29.8 PG (ref 24–34)
MCHC RBC AUTO-ENTMCNC: 31.9 G/DL (ref 31–37)
MCV RBC AUTO: 93.4 FL (ref 74–97)
MONOCYTES # BLD: 0.9 K/UL (ref 0.05–1.2)
MONOCYTES NFR BLD: 10 % (ref 3–10)
NEUTS SEG # BLD: 6.8 K/UL (ref 1.8–8)
NEUTS SEG NFR BLD: 73 % (ref 40–73)
PLATELET # BLD AUTO: 246 K/UL (ref 135–420)
PMV BLD AUTO: 11.2 FL (ref 9.2–11.8)
POTASSIUM SERPL-SCNC: 3.9 MMOL/L (ref 3.5–5.5)
RBC # BLD AUTO: 3.19 M/UL (ref 4.7–5.5)
SODIUM SERPL-SCNC: 137 MMOL/L (ref 136–145)
WBC # BLD AUTO: 9.2 K/UL (ref 4.6–13.2)

## 2019-01-02 PROCEDURE — 97535 SELF CARE MNGMENT TRAINING: CPT

## 2019-01-02 PROCEDURE — 74011250637 HC RX REV CODE- 250/637: Performed by: SURGERY

## 2019-01-02 PROCEDURE — 74011000250 HC RX REV CODE- 250: Performed by: HOSPITALIST

## 2019-01-02 PROCEDURE — 65270000029 HC RM PRIVATE

## 2019-01-02 PROCEDURE — 51798 US URINE CAPACITY MEASURE: CPT

## 2019-01-02 PROCEDURE — 97530 THERAPEUTIC ACTIVITIES: CPT

## 2019-01-02 PROCEDURE — 74011250636 HC RX REV CODE- 250/636: Performed by: SURGERY

## 2019-01-02 PROCEDURE — 36415 COLL VENOUS BLD VENIPUNCTURE: CPT

## 2019-01-02 PROCEDURE — 80048 BASIC METABOLIC PNL TOTAL CA: CPT

## 2019-01-02 PROCEDURE — 85025 COMPLETE CBC W/AUTO DIFF WBC: CPT

## 2019-01-02 PROCEDURE — 74011250637 HC RX REV CODE- 250/637: Performed by: HOSPITALIST

## 2019-01-02 RX ORDER — GABAPENTIN 100 MG/1
100 CAPSULE ORAL 3 TIMES DAILY
Status: DISCONTINUED | OUTPATIENT
Start: 2019-01-02 | End: 2019-01-04 | Stop reason: HOSPADM

## 2019-01-02 RX ORDER — OXYCODONE AND ACETAMINOPHEN 5; 325 MG/1; MG/1
2 TABLET ORAL
Status: DISCONTINUED | OUTPATIENT
Start: 2019-01-02 | End: 2019-01-04 | Stop reason: HOSPADM

## 2019-01-02 RX ORDER — OXYCODONE AND ACETAMINOPHEN 5; 325 MG/1; MG/1
1 TABLET ORAL
Status: DISCONTINUED | OUTPATIENT
Start: 2019-01-02 | End: 2019-01-04 | Stop reason: HOSPADM

## 2019-01-02 RX ORDER — HEPARIN SODIUM 5000 [USP'U]/ML
5000 INJECTION, SOLUTION INTRAVENOUS; SUBCUTANEOUS EVERY 8 HOURS
Status: DISCONTINUED | OUTPATIENT
Start: 2019-01-02 | End: 2019-01-04 | Stop reason: HOSPADM

## 2019-01-02 RX ORDER — TAMSULOSIN HYDROCHLORIDE 0.4 MG/1
0.4 CAPSULE ORAL DAILY
Status: DISCONTINUED | OUTPATIENT
Start: 2019-01-02 | End: 2019-01-04 | Stop reason: HOSPADM

## 2019-01-02 RX ADMIN — ASPIRIN 81 MG 81 MG: 81 TABLET ORAL at 08:27

## 2019-01-02 RX ADMIN — ZOLPIDEM TARTRATE 5 MG: 5 TABLET ORAL at 21:55

## 2019-01-02 RX ADMIN — Medication 10 ML: at 22:00

## 2019-01-02 RX ADMIN — OXYCODONE AND ACETAMINOPHEN 2 TABLET: 5; 325 TABLET ORAL at 17:50

## 2019-01-02 RX ADMIN — OXYCODONE AND ACETAMINOPHEN 2 TABLET: 5; 325 TABLET ORAL at 08:46

## 2019-01-02 RX ADMIN — TAMSULOSIN HYDROCHLORIDE 0.4 MG: 0.4 CAPSULE ORAL at 10:25

## 2019-01-02 RX ADMIN — HEPARIN SODIUM 5000 UNITS: 5000 INJECTION INTRAVENOUS; SUBCUTANEOUS at 21:56

## 2019-01-02 RX ADMIN — GABAPENTIN 100 MG: 100 CAPSULE ORAL at 21:55

## 2019-01-02 RX ADMIN — Medication 10 ML: at 14:00

## 2019-01-02 RX ADMIN — GABAPENTIN 100 MG: 100 CAPSULE ORAL at 10:24

## 2019-01-02 RX ADMIN — ROSUVASTATIN CALCIUM 40 MG: 20 TABLET, FILM COATED ORAL at 21:55

## 2019-01-02 RX ADMIN — GABAPENTIN 100 MG: 100 CAPSULE ORAL at 15:15

## 2019-01-02 RX ADMIN — Medication 10 ML: at 05:37

## 2019-01-02 RX ADMIN — HEPARIN SODIUM 5000 UNITS: 5000 INJECTION INTRAVENOUS; SUBCUTANEOUS at 13:00

## 2019-01-02 RX ADMIN — FAMOTIDINE 20 MG: 20 TABLET ORAL at 08:27

## 2019-01-02 NOTE — PROGRESS NOTES
Vascular Surgery Progress Note Admit Date: 2018 POD 1 Day Post-Op Procedure/Surgery:  Procedure(s): FEMORAL-POPLITEAL BYPASS  POSSIBLE THROMBECTOMY POSSIBLE ANGIO Subjective:  
Pt alert, pain RLE much improved. No dopamine overnight. Ramirez removed, has not voided. Objective:  
 
Visit Vitals BP (!) 135/100 Pulse 75 Temp 98.2 °F (36.8 °C) Resp 16 Ht 5' 8\" (1.727 m) Wt 61.2 kg (134 lb 14.7 oz) SpO2 97% BMI 20.51 kg/m² Temp (24hrs), Av.3 °F (36.8 °C), Min:98.2 °F (36.8 °C), Max:98.4 °F (36.9 °C) IV 75/hr - Urine output last shift 1600ml RENUKA 12 ml but has another 20ml this am. Serosang. Physical Exam: 
GEN: A&Ox3, NAD, comfortable. HEENT:PERRL EOMI, non icteric, moist membranes. NECK: no JVD, supple LUNG: clear b/l and unlabored breathing HEART: regular ABD: soft, NT 
EXT: RLE - incisions all intact, lower leg dressing serous drainage, Lower R ankle/foot less dark (resolving ecchymosis), R foot warm / perfused - R fem pulse palp, soft DP doppler signal. No PT. NEURO: no focal lateralizing deficits noted. Motor 5/5. Labs:  
Recent Results (from the past 24 hour(s)) CBC WITH AUTOMATED DIFF Collection Time: 19  3:00 AM  
Result Value Ref Range WBC 9.2 4.6 - 13.2 K/uL  
 RBC 3.19 (L) 4.70 - 5.50 M/uL HGB 9.5 (L) 13.0 - 16.0 g/dL HCT 29.8 (L) 36.0 - 48.0 % MCV 93.4 74.0 - 97.0 FL  
 MCH 29.8 24.0 - 34.0 PG  
 MCHC 31.9 31.0 - 37.0 g/dL  
 RDW 14.1 11.6 - 14.5 % PLATELET 668 835 - 907 K/uL MPV 11.2 9.2 - 11.8 FL  
 NEUTROPHILS 73 40 - 73 % LYMPHOCYTES 15 (L) 21 - 52 % MONOCYTES 10 3 - 10 % EOSINOPHILS 2 0 - 5 % BASOPHILS 0 0 - 2 %  
 ABS. NEUTROPHILS 6.8 1.8 - 8.0 K/UL  
 ABS. LYMPHOCYTES 1.4 0.9 - 3.6 K/UL  
 ABS. MONOCYTES 0.9 0.05 - 1.2 K/UL  
 ABS. EOSINOPHILS 0.1 0.0 - 0.4 K/UL  
 ABS. BASOPHILS 0.0 0.0 - 0.1 K/UL  
 DF AUTOMATED METABOLIC PANEL, BASIC  Collection Time: 19  3:00 AM  
 Result Value Ref Range Sodium 137 136 - 145 mmol/L Potassium 3.9 3.5 - 5.5 mmol/L Chloride 105 100 - 108 mmol/L  
 CO2 28 21 - 32 mmol/L Anion gap 4 3.0 - 18 mmol/L Glucose 119 (H) 74 - 99 mg/dL BUN 32 (H) 7.0 - 18 MG/DL Creatinine 0.83 0.6 - 1.3 MG/DL  
 BUN/Creatinine ratio 39 (H) 12 - 20 GFR est AA >60 >60 ml/min/1.73m2 GFR est non-AA >60 >60 ml/min/1.73m2 Calcium 8.2 (L) 8.5 - 10.1 MG/DL Assessment:  
 
Principal Problem: 
  Status post arterial stent (12/26/2018) Active Problems: 
  High cholesterol (12/26/2018) Hypertension (12/26/2018) Peripheral arterial disease ([facility] [address] (12/26/2018) COPD (chronic obstructive pulmonary disease) ([facility] [address] (12/26/2018) Insomnia (12/26/2018) Postprocedural hypotension (1/1/2019) 
 
12/28/18 R femBKpop nonreversed vein bypass -  
12/41/18 R LE bypass exploration - occluded non repairable. RLE perfused w/o rest pain or neurologic changes. Plan/Recommendations/Medical Decision Making: Hypotension - improved, no pressors- normal troponins, adequate hydration. D/c lexopro - was not taking prior to admission. OOB chair/ambulate - Physical therapy D/c hari in am 1/2/18, has not voided. Flomax daily RENUKA drain D/C Occluded R pop artery and appears to have adequate collateral flow to the R foot. Will see how he ambulates for further surgical plans [place], [address] [facility] cell - [phone] January 2, 2019 
08:30 AM

## 2019-01-02 NOTE — PROGRESS NOTES
1900: assumed care of pt from [facility]. Pt resting in bed, no complaints of pain, dual vascular exam completed with off going nurse. Currently on dopamine gtt. Will continue to monitor. 0700: Bedside shift change report given to Eddie Ayala RN (oncoming nurse) by Fouzia Groves RN 
 (offgoing nurse). Report included the following information SBAR, ED Summary, OR Summary and Intake/Output.

## 2019-01-02 NOTE — PROGRESS NOTES
FOC signed for skilled facilities. Patient daughter agrees to matching to two facilities only. She stated she knew we requested three but she only \" liked the reviews for two. \" [facility] matched to [facility] [place] [address] in [facility]. Matched in [place]. The [facility] is not connected, I have verified and faxed patient information to [phone], attention admissions.  Alesia Ortega RN

## 2019-01-02 NOTE — OP NOTES
[facility]  OPERATIVE REPORT    Cipriano Dumas  MR#: [id number]  : 1945  ACCOUNT #: [de-identified]   DATE OF SERVICE: 2018    PREOPERATIVE DIAGNOSIS:  Peripheral vascular disease with acute on chronic ischemia of the right leg. POSTOPERATIVE DIAGNOSIS:  Right above-knee popliteal artery occlusion. PROCEDURE PERFORMED:  Angiography of the right lower extremity with pedal and antegrade access. SURGEON:  Baldev Martínez MD      ANESTHESIA:  Local with sedation. ESTIMATED BLOOD LOSS:  Minimal.    SPECIMENS REMOVED:  None. FINDINGS:  Include right popliteal artery occlusion. COMPLICATIONS:  None. IMPLANTS:  None. INDICATIONS:  The patient is a 78-year-old male who is status post a right femoral and popliteal artery recanalization with atherectomy and stent placement. He was noted to have discoloration and loss of pulses with tingling in his right leg several days after the procedure; however, he did not contact the office to inform us of this. He continued on and was seen at [facility] for bladder biopsy when it was noted that his leg was severely discolored and he was sent to the emergency room. He was then transferred to [facility] for further workup. PROCEDURE IN DETAIL:  Once informed consent was obtained, the patient was taken to the procedure and placed supine on the table. Moderate sedation was given. A surgical timeout was performed. The patient was identified and the procedure verified. The patient was then prepped and draped in the normal sterile fashion. The dorsalis pedis artery was identified under ultrasound guidance, the skin overlying it was anesthetized with 1% lidocaine. The artery was then cannulated using a micropuncture needle. A microwire was placed up into the iliac system and the needle was replaced for a micropuncture sheath.   A 18 CXI crossing catheter and a Hydro ST wire were used in an attempt to cross the occlusion proximally that the wire and catheter needed up to the level of the stents, which is in the above-knee popliteal artery and could not be entered into the true lumen of the stent. An antegrade access was then obtained under ultrasound guidance again using a micropuncture needle and Seldinger method exchanging for a 5-Mongolian sheath. An 0.035 TrailBlazer catheter and the Glidewire were used in an attempt to cross the occlusion from proximal to distal.  However, this was unsuccessful and post-intervention angiography demonstrated it was the remaining popliteal artery occlusion within the stent. The wires and catheters were removed. The dorsalis pedis access was removed and hemostasis was achieved at that area with manual pressure. The groin access was exchanged for a 5-Mongolian ExoSeal device and manual pressure was held for 10 minutes and hemostasis was achieved. The patient tolerated the procedure well and was sent to the recovery area in stable condition. There were no immediate complications to the procedure.       MD SHANTI Fishman / PRABHU  D: 01/02/2019 07:42     T: 01/02/2019 08:21  JOB #: [id number]

## 2019-01-02 NOTE — PROGRESS NOTES
-0805-Pt. Refused nebulizer treatment. Pt. states \" he doesn't want to take it. It's a waste of money. \"O2 Sats-97% HR-80, RR-17 on Room Air.-[facility]

## 2019-01-02 NOTE — PROGRESS NOTES
Problem: Self Care Deficits Care Plan (Adult) Goal: *Acute Goals and Plan of Care (Insert Text) Occupational Therapy Goals Initiated 12/31/2018 within 7 day(s). 1.  Patient will perform grooming tasks at EOB with fair+ dynamic sitting balance. 2.  Patient will perform lower body dressing with moderate assistance utilizing adaptive strategies, prn. 
3.  Patient will perform functional task in standing for 8 minutes with supervision for balance and < 3 rest breaks to increase activity tolerance for ADLs. 4.  Patient will perform toilet transfers with minimal assistance. 5.  Patient will perform all aspects of toileting with minimal assistance/contact guard assist. 
6.  Patient will participate in upper extremity therapeutic exercise/activities with supervision/set-up for 8 minutes to increase BUE strength for functional transfers and ADLs. 7.  Patient will utilize energy conservation techniques during functional activities with minimal verbal cues. Outcome: Progressing Towards Goal 
Occupational Therapy TREATMENT Patient: hCris Castillo (1 y.o. male) Date: 1/2/2019 Diagnosis: Status post arterial stent Status post arterial stent Procedure(s) (LRB): FEMORAL-POPLITEAL BYPASS  POSSIBLE THROMBECTOMY POSSIBLE ANGIO (Left) 2 Days Post-Op Precautions:   
Chart, occupational therapy assessment, plan of care, and goals were reviewed. PLOF: Independent ASSESSMENT: 
Pt appears depressed, requires encouragement for EOB activity. Supportive daughter at bedside. Pt requires increase time and vc's for use side rail w/bed mobility to maneuver to EOB. Pt tolerates sitting EOB ~ 15 minutes performing ADL grooming tasks w/SBA. NSG arrived for bladder scan. Assist w/BLE returning to supine 2/2 c/o pain 3/10. EDUCATION Pt educated on energy conservation techniques w/ADLs Progression toward goals: 
[]          Improving appropriately and progressing toward goals [x]          Improving slowly and progressing toward goals 
[]          Not making progress toward goals and plan of care will be adjusted PLAN: 
Patient continues to benefit from skilled intervention to address the above impairments. Continue treatment per established plan of care. Discharge Recommendations:  Matt Connolly Further Equipment Recommendations for Discharge:  shower chair and rolling walker SUBJECTIVE:  
Patient stated Clara Zayas has not been a good day.  OBJECTIVE DATA SUMMARY: 
  
G CODES:  Self Care  Current  CL= 60-79%  Goal  CK= 40-59%. The severity rating is based on the Other functional assessmentCognitive/Behavioral Status: 
Neurologic State: Alert Orientation Level: Oriented X4 Cognition: Follows commands Safety/Judgement: Awareness of environment, Fall prevention Functional Mobility and Transfers for ADLs: 
 Bed Mobility: 
Supine to Sit: Additional time;Stand-by assistance Sit to Supine: Moderate assistance(assist w/BLE) Scooting: Minimum assistance(along EOB) Balance: 
Sitting: Impaired Sitting - Static: Fair (occasional) Sitting - Dynamic: Fair (occasional) ADL Intervention: 
Grooming Washing Face: Supervision/set-up Washing Hands: Supervision/set-up Brushing Teeth: Supervision/set-up Brushing/Combing Hair: Supervision/set-up Pain: 
Pre Treatment:3 Post Treatment:3 Pain Scale 1: Numeric (0 - 10) Pain Intensity 1: 3 Pain Location 1: Leg 
Pain Orientation 1: Right Pain Description 1: Shooting Pain Intervention(s) 1: Declines Activity Tolerance:   
Fair, pt fatigues easily Please refer to the flowsheet for vital signs taken during this treatment. After treatment:  
[]  Patient left in no apparent distress sitting up in chair 
[x]  Patient left in no apparent distress in bed 
[x]  Call bell left within reach [x]  Nursing notified 
[]  Caregiver present 
[]  Bed alarm activated Shayna Ordonez Time Calculation: 18 mins

## 2019-01-02 NOTE — PROGRESS NOTES
1325:  Patient received. Attempted to locate pulses with ICU RN. 
 
1327:  Patient sittin up at bedside eating. DAughter at bedside. 1339:  Patient reposition with the assistance of Isaruo Sun, [address] [place] [address]. 
 
1750:  Patient complaints of pain 3-4/10. Patient informed that he would be receiving 1 tablet of PO pain medication based on his pain rating. Patient immediately jumped to 10/10 in RLE. Stating he he feeling N/T. Requesting 2 tablets of Percocet. Bedside shift change report given to Curt Richmond RN (oncoming nurse) by Annita Paulino RN (offgoing nurse). Report included the following information SBAR, Procedure Summary and MAR.

## 2019-01-02 NOTE — PROGRESS NOTES
0730 Report received from off going RN at the bedside, inclusive of drains, lines, skin condition, status. Pain level without movement at this time is zero. Patient alert and intact. Lower extremity pulses assessed with doppler. Patient aware needs to void before noon or he will be scanned and probably straight cathed if needed. 0800 patient sitting on side of bed with assistance to attempt to void, unsuccessful. Patient now expressing pain, medicated with good results. Patient oob with PT and now resting. Patient intake very good this day po fluids and food. 1215 Report called to Receiving RN on 2200 unit. Escorted patient to unit room 2203 on bed, dopplers and skin assessment with RN receiving report at patient's new room. Transferred.

## 2019-01-02 NOTE — PROGRESS NOTES
Patient had declined to discuss transition option prior to surgery and therapy recommendations. PT is recommending rehab. Family has been provided a SNF list.  Patients wife is his NOK, however,  it has been reported she suffers from dementia and the patient is her primary care giver. Concern regarding family interaction, physician request APS referral. Will discuss transition plan with the patient today and inform him of therapy recommendations for rehab.    Britni Cason RN

## 2019-01-02 NOTE — PROGRESS NOTES
Problem: Mobility Impaired (Adult and Pediatric) Goal: *Acute Goals and Plan of Care (Insert Text) Physical Therapy Goals Initiated 12/30/2018 and to be accomplished within 7 day(s) 1. Patient will move from supine to sit and sit to supine , scoot up and down and roll side to side in bed with minimal assistance/contact guard assist.    
2.  Patient will transfer from bed to chair and chair to bed with minimal assistance/contact guard assist using the least restrictive device. 3.  Patient will perform sit to stand with minimal assistance/contact guard assist. 
4.  Patient will ambulate with minimal assistance/contact guard assist for >/= 75 feet with the least restrictive device. 5.  Patient will demonstrate independence with performance of home exercise program. 
  
Outcome: Progressing Towards Goal 
 
PHYSICAL THERAPY: Daily TREATMENT Note INPATIENT: Medicare: Hospital Day: 8 Patient: Joshua William (7 y.o. male)    Date: 1/2/2019 Primary Diagnosis: Status post arterial stent Procedure(s) (LRB): FEMORAL-POPLITEAL BYPASS  POSSIBLE THROMBECTOMY POSSIBLE ANGIO (Left), 2 Days Post-Op, Precautions:   
 
 
Chart, physical therapy assessment, plan of care and goals were reviewed. PLOF:Independent ASSESSMENT: 
Pt making good progress today, min a for bed mobility and transfer to seated EOB. Pt instructed in foot positioning and strategies for weight shift to facilitate sit<>stand trasfers. First sit<>stand min a X2 with verbal cues for weight shift and safety with RW, pt able to take 2 steps fwd/back with verbal cues for sequencing. Second and third trials pt CGA for sit<>stand, amb 3 ft fwd/back SBA. Pt able to scoot at EOB mod I; instructed in relaxation and breathing techniques to facilitate ease of movement and maintain normal respirations/sats. Progression toward goals: 
      Improving appropriately and progressing toward goals PLAN: 
 Patient continues to benefit from skilled intervention to address the above impairments. Continue treatment per established plan of care. EDUCATION:  
Education:  Patient was educated on the following topics: safe transfers Barriers to Learning/Limitations: None Compensate with: visual, verbal, tactile, kinesthetic cues/model Discharge Recommendations:  Home Health v. SNF Further Equipment Recommendations for Discharge:  rolling walker Factors which may impact discharge planning: family preferences SUBJECTIVE:  
Patient stated It getting easier the more that I move.  OBJECTIVE DATA SUMMARY:  
Critical Behavior: 
Neurologic State: Alert Orientation Level: Oriented X4 Cognition: Follows commands Safety/Judgement: Awareness of environment, Fall prevention G CODE:Mobility [id number] Current  CL= 60-79%   Goal  CK= 40-59%. The severity rating is based on the Other  [address] Standing Balance Scale 
0: Pt performs 25% or less of standing activity (Max assist) CN, 100% impaired. 1: Pt supports self with upper extremities but requires therapist assistance. Pt performs 25-50% of effort (Mod assist) CM, 80% to <100% impaired. 1+: Pt supports self with upper extremities but requires therapist assistance. Pt performs >50% effort. (Min assist). CL, 60% to <80% impaired. 2: Pt supports self independently with both upper extremities (walker, crutches, parallel bars). CL, 60% to <80% impaired. 2+: Pt support self independently with 1 upper extremity (cane, crutch, 1 parallel bar). CK, 40% to <60% impaired. 3: Pt stands without upper extremity support for up to 30 seconds. CK, 40% to <60% impaired. 3+: Pt stands without upper extremity support for 30 seconds or greater. CJ, 20% to <40% impaired. 4: Pt independently moves and returns center of gravity 1-2 inches in one plane. CJ, 20% to <40% impaired.  
4+: Pt independently moves and returns center of gravity 1-2 inches in multiple planes. CI, 1% to <20% impaired. 5: Pt independently moves and returns center of gravity in all planes greater than 2 inches. CH, 0% impaired. Functional Mobility: 
 
 
Functional Status Indep (I) Mod I Super-vision/CGA Min A Mod A Max A Total A Assist x2 Verbal cues Additional time Not tested Comments Rolling []  []  [] [x]    []    []  []  [] [] [] [] Supine to sit []  []  [] [x]  []  []  []  [] [] [] [] Sit to supine []  []  [] [x]  []  []  []  [] [] [] [] Sit to stand []  []  [x] []  []  []  []  [] [] [] []   
Stand to sit []  []  [x] []  []  []  []  [] [] [] [] Bed to chair transfers []  []  [x] []  []  []  []  [] [] [] [] Balance Good [address] Poor Unable Not tested Comments Sitting static [x]  []  []  []  [] Sitting dynamic [x]  []  []  []  []   
Standing static [x]  []  []  []  []   
Standing dynamic [x]  []  []  []  [] With support Mobility/Gait:  
Level of Assistance: Contact guard assistance Assistive Device: rolling walker Distance Ambulated: 3 feet Base of Support: center of gravity altered Speed/Nadia: pace decreased (<100 feet/min) Step Length: left shortened and right shortened Swing Pattern: right asymmetrical 
Stance: time Gait Abnormalities: step to gait Stairs:  
Level of Assistance: Unable at this time Therapeutic Exercises:  
 
 
 
EXERCISE Sets Reps Active Active Assist  
Passive Self ROM Comments Ankle Pumps 2 10  [x] [] [] [] Quad Sets/Glut Sets   [] [] [] [] Hamstring Sets   [] [] [] [] Short Arc Quads   [] [] [] [] Heel Slides   [] [] [] [] Straight Leg Raises   [] [] [] [] Hip Abd/Add   [] [] [] [] Long Arc Quads 1 10 [x] [] [] [] Seated Marching   [] [] [] []   
Standing Marching   [] [] [] []   
   [] [] [] []   
 
 
Vital Signs Temp: 98.2 °F (36.8 °C) Pulse (Heart Rate): 75 BP: (!) 135/100 Resp Rate: 16    
O2 Sat (%): 97 %Pain:0 
 Pre treatment pain level:6 Post treatment pain level:6Pain Scale 1: Numeric (0 - 10) Pain Intensity 1: 6 Pain Location 1: Leg 
Pain Orientation 1: Right Pain Description 1: Shooting Pain Intervention(s) 1: Medication (see MAR) Activity Tolerance:  
Good After treatment:  
Patient left in no apparent distress in bed Call bell left within reach Nursing notified Caregiver present Tacy DAY Stewart Time Calculation: 28 mins

## 2019-01-02 NOTE — PROGRESS NOTES
TRANSFER - IN REPORT: 
 
Verbal report received from JANEEN Callaway(name) on Magalis Gardner  being received from Neuro ICU ([id number]) for routine post - op Report consisted of patients Situation, Background, Assessment and  
Recommendations(SBAR). Information from the following report(s) SBAR, Procedure Summary, Intake/Output, MAR, Recent Results and Med Rec Status was reviewed with the receiving nurse. Opportunity for questions and clarification was provided. Assessment completed upon patients arrival to unit and care assumed.

## 2019-01-02 NOTE — OP NOTES
[facility] DIVISION  OPERATIVE REPORT    Bridget Bowles  MR#: [id number]  : 1945  ACCOUNT #: [de-identified]   DATE OF SERVICE: 2018    PREOPERATIVE DIAGNOSIS:  Atherosclerotic occlusive disease with critical right leg ischemia. POSTOPERATIVE DIAGNOSIS:  Atherosclerotic occlusive disease with critical right leg ischemia. PROCEDURE PERFORMED:  Right femoral to popliteal artery bypass with nonreversed saphenous vein graft. SURGEON:  Vlad Mascorro MD    ASSISTANT:  Bing Padilla. ANESTHESIA:  General endotracheal.    ESTIMATED BLOOD LOSS:  100 mL. SPECIMENS REMOVED:  None. FINDINGS:  2-vessel runoff to the right foot via the PT and the DP.    COMPLICATIONS:  None. IMPLANTS:  None. INDICATIONS:  The patient is an unfortunate 75-year-old male who recently had a right popliteal artery atherectomy with angioplasty and stent placement, which occluded several days after the procedure. The patient was taken yesterday for an angiogram in an attempt to reopen the popliteal artery and this was unsuccessful. He was therefore taken for a right femoral to popliteal artery bypass with nonreversed saphenous vein graft. INDICATIONS:  The patient is a 75-year-old male who presents with right popliteal artery occlusion and rest pain, discoloration of the lower leg. He is therefore taken for a right femoral to popliteal artery bypass with nonreversed saphenous vein graft. PROCEDURE IN DETAIL:  Once informed consent was obtained, the patient was taken to the operating room and placed supine on the operating table. General endotracheal anesthesia was induced and a surgical timeout was performed. The patient was identified and the procedure verified. The patient was then prepped and draped in the normal sterile fashion. The right greater saphenous vein was identified at the lower leg by ultrasound guidance and the skin overlying it was incised.   The vein was identified and dissected free from surrounding tissue, and branch points were ligated. The popliteal artery was then dissected free in the below knee space and surrounded proximally and distally with vessel loops. The dissection of the vein continued proximally until it was dissected to the saphenofemoral junction, leaving skin bridges between incisions. The femoral artery was identified and dissected free from the surrounding tissues and surrounded proximally and distally with vessel loops. The vein was ligated proximally and distally. It was removed and nonreversed. It was rinsed with heparinized saline, and branch points that had not been previously ligated were closed with 7-0 Prolene suture. The vein was then prepared by spatulation. The proximal and distal control of femoral artery was gained. An arteriotomy was made. This was extended with Quevedo scissors. The vein was then anastomosed to the artery in an end-to-side fashion using a 6-0 Prolene suture. The vein was tunneled, mostly in its vein bed, and brought down to the popliteal incision. Proximal and distal control of the popliteal artery was gained and an arteriotomy was made. This was extended with Quevedo scissors, and the vein was then prepared by spatulation and an end-to-side anastomosis was completed. Unfortunately, once the anastomosis was completed, there was no flow within the graft. The distal anastomosis was taken down. Valvulotome was run through the vein 2 times and distal flow was then noted. The distal anastomosis was then resewn using 6-0 Prolene suture and again at the conclusion of this, there was no flow distally within the popliteal artery. An attempt to pass a Regulo catheter did not allow it to be passed through the distal anastomosis. Therefore, the distal anastomosis was taken down for a second time.   We repaired the artery, rinsed with heparinized saline and the third anastomosis was undertaken under direct vision. At this point, the flow was noted to be good and biphasic through the distal popliteal artery. A butterfly needle was inserted into the graft and an angiogram was performed of the distal runoff demonstrating the anterior and posterior tibial arteries as the runoff to the foot. This butterfly needle was removed and hemostasis was achieved with a 7-0 Prolene suture. The wounds were copiously irrigated with bacitracin and saline. FloSeal was placed in the groin and popliteal incision and the incisions were closed in multiple layers with Vicryl and Monocryl suture. Dermabond was placed. The patient was extubated and sent to the PACU in stable condition. There were no immediate complications to the procedure.         MD SHANTI Tyler /   D: 01/02/2019 14:39     T: 01/02/2019 16:51  JOB #: [id number]

## 2019-01-02 NOTE — OP NOTES
[facility] DIVISION  OPERATIVE REPORT    Presley Sanches  MR#: [id number]  : 1945  ACCOUNT #: [de-identified]   DATE OF SERVICE: 2018    PREOPERATIVE DIAGNOSIS:  Right leg femoral to below-knee popliteal vein bypass occlusion. POSTOPERATIVE DIAGNOSIS:  Right leg femoral to below-knee popliteal vein bypass occlusion with distal vein disruption and severe stenosis near the distal anastomosis. PROCEDURE PERFORMED:  Right femoral to popliteal artery vein bypass with open thigh thrombectomy and distal right below-the-knee popliteal anastomotic exploration. SURGEON:  Godwin Rodrigez MD     SURGICAL :  None. ASSISTANT:  Nasim Curran     TIME:  Incision start is 1500; incision closed 1635. ANESTHESIA:  General endotracheal anesthesia. ESTIMATED BLOOD LOSS:  100 mL. FLUIDS:  300 of saline. URINE OUTPUT:  225. DOPAMINE INFUSION:  7.5 mcg per kilogram per minute through the duration of the case. HEPARIN:  6000 units IV. DRAINS:  The distal incision had a 10 mm Max-Sanchez flat drain placed with the exit site below the incision. SPECIMENS REMOVED:  None. FINDINGS:  Fully thrombosed right femoral to below-knee popliteal vein bypass. Approximately 2 cm proximal to the distal anastomosis, posterior vein wall had a 1.5 cm longitudinal wall separation. There were 2 more proximal openings and a 1.1-1.5 mm lumen at the heel of the anastomosis. Subsequently, the vein was ligated due to insufficient vein and inadequate vein to complete the bypass revision. COMPLICATIONS:  None. IMPLANTS:  None. DESCRIPTION OF PROCEDURE:  After consent was obtained, the patient was taken to the operating room due to the occlusion of this bypass, shortly after placement 2 days previously.  After satisfactory induction of general anesthesia, the right leg was prepped from the toes to the abdomen with Betadine, it was allowed to dry and draped appropriately and the procedure, timeout was performed. In the mid-thigh, an incision was opened. There was approximately 50 mL of thrombosed and liquid blood that was evacuated and irrigated. The vein appeared edematous. It had an approximately 50% transverse venotomy made. There was fully thrombosed vein noted. A 2 mm Regulo balloon was then passed proximally. We then changed to a 3 mm and after passing approximately 6 times, we were able to gain brisk inflow. It was very pulsatile and appeared to be nonocclusive proximally. This vein was then irrigated with heparinized saline and then a bulldog was placed to prevent this from reclotting. At this time we were able to pass the 2 then 3 mm balloon Regulo balloon approximately only 20 cm and it was approximately 35 cm to the distal anastomosis. We removed clot, however, there was no backbleeding. The lower leg incision was then opened. There was approximately 100-200 mL of thrombosed and liquid blood in this region that was under pressure that was irrigated free. There was no active bleeding noted. Vesseloops were placed about the popliteal artery that had been previously dissected free proximally and distally and then it was doubly passed. At this point, patient was given 6000 units of heparin as we confirmed it was flow in the popliteal artery with Doppler. Vesseloops were tightened after 4 minutes of heparinization proximally and distally and the transverse venotomy was made in the graft just proximal to the heel of the anastomosis. There was no clot in the lumen; however, the lumen appeared to be very small and barely admit the tips of a Herb forceps. It was approximately 1.5 mm in diameter or less. More proximally we were unable to pass the Regulo balloon more than 1 cm due to angulation.   Subsequently, we then irrigated from above with the olive-tip  and found that we had billowing of the vein, the only thin layer of adventitia intact with approximately 1.5 cm posterior longitudinal separation in the vein. There were also 2 other areas that were bleeding and partial disruption. The anastomosis had been performed twice in the initial surgery and due to the small caliber of the vein at the heel as well as disruptive vein it was felt it was not repairable at this point and the area of the arteriotomy was then closed with a 7-0 Prolene suture. At this point, we flushed the popliteal artery proximally and distally, and irrigated with heparinized saline and completed this repair, removed the Vesseloops, we had good hemostasis and was audible flow within the popliteal artery. It was minimally pulsatile by Doppler; however, there was brisk capillary refill in the foot, as in the preoperative state that was a nonaudible Doppler signal in the foot, however, there was brisk capillary refill and the foot was continued to be warm. At this point, the section of vein was removed and the wounds were irrigated. A small stab wound was made approximately 3 cm below the knee popliteal wound and a 10 mm Max-Sanchez drain was passed, cut to the proper length and it was secured in place with a nylon suture. After hemostasis of both wounds with Thrombin and Gelfoam, we then irrigated appropriately. There was no bleeding. We did tie off the vein in the mid-thigh as it was no longer functional to decrease inflammation. 3-0 Vicryl was used to close the subcutaneous tissues and staples in the skin layer. The popliteal wound the fascia was closed up to the Max-Sanchez drain with a running 3-0 Vicryl suture. The subcutaneous tissue closed partially with 3-0 Vicryl suture due to the thin layer posteriorly and then closed the remainder was staples. Xeroform was placed over both wounds with gauze and Tegaderm dressing on these. The previous wounds that were closed with 4-0 Monocryl and Dermabond continued to be intact in the groin and at the knee level.   Patient was extubated in the operating room and taken to recovery room in stable condition, still required a small amount of dopamine to maintain his blood pressure above 100.         MD GÓMEZ Ocon[place] / [place]  D: 01/01/2019 17:19     T: 01/01/2019 20:49  JOB #: [id number]  CC: [facility]  CC: [facility]

## 2019-01-02 NOTE — PROGRESS NOTES
Problem: Falls - Risk of 
Goal: *Absence of Falls Document Ole Dye Fall Risk and appropriate interventions in the flowsheet. Outcome: Progressing Towards Goal 
Fall Risk Interventions: 
Mobility Interventions: Patient to call before getting OOB Medication Interventions: Bed/chair exit alarm Elimination Interventions: Call light in reach Problem: Pressure Injury - Risk of 
Goal: *Prevention of pressure injury Document Gee Scale and appropriate interventions in the flowsheet. Outcome: Progressing Towards Goal 
Pressure Injury Interventions: 
Sensory Interventions: Assess changes in LOC, Keep linens dry and wrinkle-free, Minimize linen layers Moisture Interventions: Absorbent underpads, Apply protective barrier, creams and emollients, Maintain skin hydration (lotion/cream) Activity Interventions: Pressure redistribution bed/mattress(bed type) Mobility Interventions: Assess need for specialty bed, Pressure redistribution bed/mattress (bed type) Nutrition Interventions: Document food/fluid/supplement intake Friction and Shear Interventions: Apply protective barrier, creams and emollients, HOB 30 degrees or less, Lift sheet

## 2019-01-03 PROCEDURE — 74011250637 HC RX REV CODE- 250/637: Performed by: SURGERY

## 2019-01-03 PROCEDURE — 77030011943

## 2019-01-03 PROCEDURE — 74011250636 HC RX REV CODE- 250/636: Performed by: SURGERY

## 2019-01-03 PROCEDURE — 74011250637 HC RX REV CODE- 250/637: Performed by: HOSPITALIST

## 2019-01-03 PROCEDURE — 51798 US URINE CAPACITY MEASURE: CPT

## 2019-01-03 PROCEDURE — 77030037878 HC DRSG MEPILEX >48IN BORD MOLN -B

## 2019-01-03 PROCEDURE — 65270000029 HC RM PRIVATE

## 2019-01-03 PROCEDURE — 97535 SELF CARE MNGMENT TRAINING: CPT

## 2019-01-03 PROCEDURE — 97530 THERAPEUTIC ACTIVITIES: CPT

## 2019-01-03 RX ADMIN — GABAPENTIN 100 MG: 100 CAPSULE ORAL at 16:11

## 2019-01-03 RX ADMIN — HEPARIN SODIUM 5000 UNITS: 5000 INJECTION INTRAVENOUS; SUBCUTANEOUS at 04:53

## 2019-01-03 RX ADMIN — TAMSULOSIN HYDROCHLORIDE 0.4 MG: 0.4 CAPSULE ORAL at 08:40

## 2019-01-03 RX ADMIN — OXYCODONE AND ACETAMINOPHEN 1 TABLET: 5; 325 TABLET ORAL at 21:41

## 2019-01-03 RX ADMIN — FAMOTIDINE 20 MG: 20 TABLET ORAL at 08:40

## 2019-01-03 RX ADMIN — OXYCODONE AND ACETAMINOPHEN 2 TABLET: 5; 325 TABLET ORAL at 04:51

## 2019-01-03 RX ADMIN — ZOLPIDEM TARTRATE 5 MG: 5 TABLET ORAL at 21:41

## 2019-01-03 RX ADMIN — ROSUVASTATIN CALCIUM 40 MG: 20 TABLET, FILM COATED ORAL at 21:42

## 2019-01-03 RX ADMIN — HEPARIN SODIUM 5000 UNITS: 5000 INJECTION INTRAVENOUS; SUBCUTANEOUS at 13:55

## 2019-01-03 RX ADMIN — FAMOTIDINE 20 MG: 20 TABLET ORAL at 17:18

## 2019-01-03 RX ADMIN — GABAPENTIN 100 MG: 100 CAPSULE ORAL at 21:41

## 2019-01-03 RX ADMIN — ASPIRIN 81 MG 81 MG: 81 TABLET ORAL at 08:40

## 2019-01-03 RX ADMIN — Medication 10 ML: at 06:00

## 2019-01-03 RX ADMIN — HEPARIN SODIUM 5000 UNITS: 5000 INJECTION INTRAVENOUS; SUBCUTANEOUS at 21:41

## 2019-01-03 RX ADMIN — OXYCODONE AND ACETAMINOPHEN 2 TABLET: 5; 325 TABLET ORAL at 18:17

## 2019-01-03 RX ADMIN — OXYCODONE AND ACETAMINOPHEN 2 TABLET: 5; 325 TABLET ORAL at 08:40

## 2019-01-03 RX ADMIN — Medication 10 ML: at 21:42

## 2019-01-03 RX ADMIN — OXYCODONE AND ACETAMINOPHEN 2 TABLET: 5; 325 TABLET ORAL at 14:31

## 2019-01-03 RX ADMIN — GABAPENTIN 100 MG: 100 CAPSULE ORAL at 08:40

## 2019-01-03 NOTE — PROGRESS NOTES
Vascular Surgery Progress Note Admit Date: 2018 POD 1 Day Post-Op Procedure/Surgery:  Procedure(s): FEMORAL-POPLITEAL BYPASS  POSSIBLE THROMBECTOMY POSSIBLE ANGIO Subjective:  
Pt alert, pain RLE stable, denies rest pain. Voiding. Walked with PT/OT. Objective:  
 
Visit Vitals /65 (BP 1 Location: Right arm, BP Patient Position: At rest) Pulse 60 Temp 98.4 °F (36.9 °C) Resp 16 Ht 5' 8\" (1.727 m) Wt 61.2 kg (134 lb 14.7 oz) SpO2 91% BMI 20.51 kg/m² Temp (24hrs), Av.3 °F (36.8 °C), Min:97.9 °F (36.6 °C), Max:98.5 °F (36.9 °C) IV 75/hr - Urine output last shift 1600ml RENUKA 12 ml but has another 20ml this am. Serosang. Physical Exam: 
GEN: A&Ox3, NAD, comfortable. HEENT:PERRL EOMI, non icteric, moist membranes. NECK: no JVD, supple LUNG: clear b/l and unlabored breathing HEART: regular ABD: soft, NT 
EXT: RLE - incisions all intact, lower leg dressing serous drainage, Lower R ankle/foot less dark (resolving ecchymosis), R foot warm / perfused - R fem pulse palp, soft DP doppler signal. No PT. NEURO: no focal lateralizing deficits noted. Motor 5/5. Labs:  
No results found for this or any previous visit (from the past 24 hour(s)). Assessment:  
 
Principal Problem: 
  Status post arterial stent (2018) Active Problems: 
  High cholesterol (2018) Hypertension (2018) Peripheral arterial disease ([facility] [address] (2018) COPD (chronic obstructive pulmonary disease) ([facility] [address] (2018) Insomnia (2018) Postprocedural hypotension (2019) 
 
18 R femBKpop nonreversed vein bypass -  
 R LE bypass exploration - occluded non repairable. RLE perfused w/o rest pain or neurologic changes. Plan/Recommendations/Medical Decision Making: OOB chair/ambulate - Physical therapy Ok to D/C from vascular standpoint home with close follow up. Occluded R pop artery and appears to have adequate collateral flow to the R foot. Will see how he ambulates for further surgical plans Arthuro Myles, [address] [facility] - [phone] January 3, 2019 
08:30 AM

## 2019-01-03 NOTE — PROGRESS NOTES
Received bedside and verbal shift change report from JANEEN Ng report included the following information SBAR, Kardex, Intake/Output and MAR. Pt laying in the bed alert and verbal denied acute respiratory distress nor discomfort at this time. Denied pain at this time call bell and urinal within reach will continue to monitor. 2105 Voided 100 mL cyrus color urine will bladder scan call bell within reach. 2111 Bladder scan 507 mL per pt \"bladder feels full\" will straight cath. 2138 Straight cath done with secondary RN present 500 mL cyrus color urine tolerated procedure with minimal discomfort. 0330 Pt sleeping soundly urinal at bedside emptied 160 mL NAD noted. 9868 Attempted to void to urinal before bladder scan but unable BS shows 323 mL will perform straight cath. 0530 Straight cath with another RN, pt tolerated well o/p 400 mL cyrus color urine. Pt also cleaned and repositioned tolerated call bell and urinal within reach will continue to monitor. Bedside and Verbal shift change report given to Tarcy Holloway RN (oncoming nurse) by Eddi Vasquez RN (offgoing nurse). Report included the following information SBAR, Kardex, Intake/Output, MAR and Recent Results.

## 2019-01-03 NOTE — PROGRESS NOTES
[address] [place], [place] [address] Daily progress Note Patient: Dale Byrne MRN: [id number]  CSN: [id number] YOB: 1945  Age: 74 y.o. Sex: male DOA: 12/26/2018 LOS:  LOS: 8 days Assessment/Plan Principal Problem: 
  Status post arterial stent (12/26/2018) Active Problems: 
  High cholesterol (12/26/2018) Hypertension (12/26/2018) Peripheral arterial disease ([facility] [address] (12/26/2018) COPD (chronic obstructive pulmonary disease) ([facility] [address] (12/26/2018) Insomnia (12/26/2018) Postprocedural hypotension (1/1/2019) - Diet and mobilization per primary team 
- Pain control PRN 
- PT/OT 
- Hypotension - resolved. BP stable. Would discharge off antihypertensives, can f/u with PCP to see if he needs to be restarted. - Vasc surgery to d/c patient with close follow-up 
- Urinary retention 2/2 BPH - improved, can d/c on flomax 
- Cont acceptable home medications for chronic conditions  
- DVT protocol: heparin sq restarted 
  
I have personally reviewed all pertinent labs and films that have officially resulted over the last 24 hours. I have personally checked for all pending labs that are awaiting final results. Subjective / Interval History:  
 
Doing well. Wants to go home. Objective:  
  
 
Visit Vitals /65 (BP 1 Location: Right arm, BP Patient Position: At rest) Pulse 60 Temp 98.4 °F (36.9 °C) Resp 16 Ht 5' 8\" (1.727 m) Wt 61.2 kg (134 lb 14.7 oz) SpO2 91% BMI 20.51 kg/m² Physical Exam: 
Visit Vitals /65 (BP 1 Location: Right arm, BP Patient Position: At rest) Pulse 60 Temp 98.4 °F (36.9 °C) Resp 16 Ht 5' 8\" (1.727 m) Wt 61.2 kg (134 lb 14.7 oz) SpO2 91% BMI 20.51 kg/m² General appearance: alert, cooperative, no distress, appears stated age HEENT: negative Neck: supple, symmetrical, trachea midline, no adenopathy, thyroid: not enlarged, symmetric, no tenderness/mass/nodules, no carotid bruit and no JVD Lungs: clear to auscultation bilaterally Heart: regular rate and rhythm, S1, S2 normal, no murmur, click, rub or gallop Abdomen: soft, non-tender. Bowel sounds normal. No masses,  no organomegaly Skin: skin discoloration R leg & foot - dusky & bluish Ext: R foot warm, serous drainage in RENUKA Intake and Output: 
Current Shift:  No intake/output data recorded. Last three shifts:  01/01 1901 - 01/03 0700 In: 1610.8 [P.O.:1160; I.V.:450.8] Out: 0488 [Urine:2540; Drains:65] No results found for this or any previous visit (from the past 24 hour(s)). Current Facility-Administered Medications:  
  tamsulosin (FLOMAX) capsule 0.4 mg, 0.4 mg, Oral, DAILY, [facility], 0.4 mg at 01/03/19 0840 
  oxyCODONE-acetaminophen (PERCOCET) 5-325 mg per tablet 2 Tab, 2 Tab, Oral, Q4H PRN, Toya Wray MD, 2 Tab at 01/03/19 0840 
  gabapentin (NEURONTIN) capsule 100 mg, 100 mg, Oral, TID, Toya Wray MD, 100 mg at 01/03/19 0840 
  heparin (porcine) injection 5,000 Units, 5,000 Units, SubCUTAneous, Q8H, Toya Wray MD, 5,000 Units at 01/03/19 7257   oxyCODONE-acetaminophen (PERCOCET) 5-325 mg per tablet 1 Tab, 1 Tab, Oral, Q4H PRN, Nadja Taylor MD 
  famotidine (PEPCID) tablet 20 mg, 20 mg, Oral, BID, Laurita Taylor MD, 20 mg at 01/03/19 0840 
  haloperidol lactate (HALDOL) injection 2 mg, 2 mg, IntraVENous, Q2H PRN, Shon Coles DO, 2 mg at 12/30/18 9007   aspirin chewable tablet 81 mg, 81 mg, Oral, DAILY, Sarai Tapia MD, 81 mg at 01/03/19 0840   zolpidem (AMBIEN) tablet 5 mg, 5 mg, Oral, QHS PRN, Sarai Taipa MD, 5 mg at 01/02/19 2155   melatonin tablet 1.5 mg, 1.5 mg, Oral, QHS PRN, Sarai Tapia MD 
  0.9% sodium chloride infusion 250 mL, 250 mL, IntraVENous, PRN, Tamie Solorzano MD 
   sodium chloride (NS) flush 5-10 mL, 5-10 mL, IntraVENous, Q8H, Laurita Taylor MD, 10 mL at 01/03/19 0600 
  sodium chloride (NS) flush 5-10 mL, 5-10 mL, IntraVENous, PRN, Philip Taylor MD, 10 mL at 12/28/18 2109   acetaminophen (TYLENOL) tablet 650 mg, 650 mg, Oral, Q4H PRN, Philip Taylor MD 
  naloxone [facility]) injection 0.4 mg, 0.4 mg, IntraVENous, PRN, Philip Taylor MD 
  ondansetron (ZOFRAN) injection 4 mg, 4 mg, IntraVENous, Q4H PRN, Philip Taylor MD 
  diazePAM (VALIUM) tablet 5 mg, 5 mg, Oral, Q8H PRN, Nieves Fortune DO 
  rosuvastatin (CRESTOR) tablet 40 mg, 40 mg, Oral, QHS, Ofilia Raquel H, DO, 40 mg at 01/02/19 2155   budesonide (PULMICORT) 500 mcg/2 ml nebulizer suspension, 500 mcg, Nebulization, BID RT, Nieves Fortune DO, 500 mcg at 01/01/19 2035   arformoterol (BROVANA) neb solution 15 mcg, 15 mcg, Nebulization, BID RT, Nieves Fortune DO, 15 mcg at 01/01/19 2035 Lab Results Component Value Date/Time  Glucose 119 (H) 01/02/2019 03:00 AM  
 Glucose 138 (H) 01/01/2019 02:40 AM  
 Glucose 118 (H) 12/29/2018 01:15 AM  
 Glucose 89 12/26/2018 02:05 PM  
 Glucose 90 05/19/2015 12:49 PM  
  
 
 
 
 
 
 
Tara Dubose MD 
1/3/2019, 4:42 PM

## 2019-01-03 NOTE — PROGRESS NOTES
Delivered the pt a rolling walker from the [employer]. Faxed the referral and signed delivery ticket to [facility] for processing. Copy of signed delivery ticket given jada the pt. Wanda Fermin FOC on the chart for [facility]. Signed copy given to the pt. DC pending. Pt's dgt verified the contact information on the face sheet is correct. . 
Care Management Interventions PCP Verified by CM: Yes(Avelino Ely(saw 3-4 weeks ago)) Mode of Transport at Discharge: Self(Spouse will transport home) Transition of Care Consult (CM Consult): Home Health [facility] - INPATIENT: Yes Current Support Network: Lives with Spouse Confirm Follow Up Transport: Self Plan discussed with Pt/Family/Caregiver: Yes Freedom of Choice Offered: Yes Discharge Location Discharge Placement: Home with home health

## 2019-01-03 NOTE — PROGRESS NOTES
0720 - Bedside and Verbal shift change report given to Shae Marrufo RN (oncoming nurse) by Zaria Sheppard RN (offgoing nurse). Report included the following information SBAR, Kardex, Intake/Output and MAR. Pt DTV by 1100. Pt verbalizes need to void, AOx4, shift assessment performed, will cont to monitor. 0840 - prn meds administered for pain rated 8/10. Will cont to monitor. 1035 - pt family in room at this time. Pt daughter states that pt doesn't seem like himself. When asked if she could describe his behavior, daughter states \"hes just not angry like his normal self\". Will monitor. 1213 - bladder scan performed. Pt had 213 mL in bladder. Will cont to monitor & notify MD.  
 
6471 - orders to straight cath then trial voiding per Dr. Rodriguez Figures 1300 - straight cath performed. 200mL removed. Pt tolerated well. Will monitor. 1329 - notified by OT that pt reports numbness in LLE & \"loss of feeling\". Will assess pt 
 
1355 - assessed pt w/doppler. Pulses present & sluggish. Asked pt regarding \"numbness\" he reported to OT, pt states \"its been numb since before I had the sugery that's nothing new\". Administered scheduled heparin. Will cont to monitor. 1431 - prn meds administered for pain rated 8/10 after ambulating back to bed from recliner. 1530 - pain reassessment performed. Pt states pain is numb but throbbing. Will cont to monitor. 1540 - updated Dr. Kurt Peter of pt status & d/c.  
 
1817 - prn meds administered for pain rated 8/10. Pt cont to deny urge to void. Will cont to monitor. Bedside and Verbal shift change report given to Zaria Sheppard, JANEEN (oncoming nurse) by Shae Marrufo RN (offgoing nurse). Report included the following information SBAR, Kardex, Intake/Output and MAR.

## 2019-01-03 NOTE — PROGRESS NOTES
Problem: Mobility Impaired (Adult and Pediatric) Goal: *Acute Goals and Plan of Care (Insert Text) Physical Therapy Goals Initiated 12/30/2018 and to be accomplished within 7 day(s) 1. Patient will move from supine to sit and sit to supine , scoot up and down and roll side to side in bed with minimal assistance/contact guard assist.    
2.  Patient will transfer from bed to chair and chair to bed with minimal assistance/contact guard assist using the least restrictive device. 3.  Patient will perform sit to stand with minimal assistance/contact guard assist. 
4.  Patient will ambulate with minimal assistance/contact guard assist for >/= 75 feet with the least restrictive device. 5.  Patient will demonstrate independence with performance of home exercise program. 
  
Outcome: Progressing Towards Goal 
 
PHYSICAL THERAPY: Daily TREATMENT Note INPATIENT: Medicare: Hospital Day: 9 Patient: Saniya Zhao (66 y.o. male)    Date: 1/3/2019 Primary Diagnosis: Status post arterial stent Procedure(s) (LRB): FEMORAL-POPLITEAL BYPASS  POSSIBLE THROMBECTOMY POSSIBLE ANGIO (Left), 3 Days Post-Op, Precautions:   
 
 
Chart, physical therapy assessment, plan of care and goals were reviewed. PLOF:Independent ASSESSMENT: 
Pt continues to make good progress towards goals; practiced bed mobility on flat bed without use of bedrails; pt required verbal cues for sequencing, pt slow with mobility but completes tasks. Pt min a for sup>sit; pt instructed in relaxation and breathing techniques with transfers and throughout treatment. Pt very tense throughout and holding breath, with constant cues to relax and breath to facilitate ease of movement. Pt progressing well with gait training; instructed with verbal cues for sequencing and walker management.   First trial X10 ft pt with very shortened steps; second trial X 10 ft pt with improved step length and pacing. Educated pt and family on positioning in chair, encouraged increased OOB time. Progression toward goals: 
      Improving appropriately and progressing toward goals PLAN: 
Patient continues to benefit from skilled intervention to address the above impairments. Continue treatment per established plan of care. EDUCATION:  
Education:  Patient was educated on the following topics: safety with RW Barriers to Learning/Limitations: None Compensate with: visual, verbal, tactile, kinesthetic cues/model Discharge Recommendations:  Home Health  Pending progress, and Matt Connolly Further Equipment Recommendations for Discharge:  rolling walker Factors which may impact discharge planning: family involvement SUBJECTIVE:  
Patient stated Lyndsay Tobias will try and walk today.  OBJECTIVE DATA SUMMARY:  
Critical Behavior: 
Neurologic State: Alert Orientation Level: Oriented X4 Cognition: Follows commands Safety/Judgement: Awareness of environment, Fall prevention G CODE:Mobility [id number] Current  CL= 60-79%   Goal  CK= 40-59%. The severity rating is based on the Other [address]US[address] Standing Balance Scale 
0: Pt performs 25% or less of standing activity (Max assist) CN, 100% impaired. 1: Pt supports self with upper extremities but requires therapist assistance. Pt performs 25-50% of effort (Mod assist) CM, 80% to <100% impaired. 1+: Pt supports self with upper extremities but requires therapist assistance. Pt performs >50% effort. (Min assist). CL, 60% to <80% impaired. 2: Pt supports self independently with both upper extremities (walker, crutches, parallel bars). CL, 60% to <80% impaired. 2+: Pt support self independently with 1 upper extremity (cane, crutch, 1 parallel bar). CK, 40% to <60% impaired. 3: Pt stands without upper extremity support for up to 30 seconds. CK, 40% to <60% impaired. 3+: Pt stands without upper extremity support for 30 seconds or greater. CJ, 20% to <40% impaired. 4: Pt independently moves and returns center of gravity 1-2 inches in one plane. CJ, 20% to <40% impaired. 4+: Pt independently moves and returns center of gravity 1-2 inches in multiple planes. CI, 1% to <20% impaired. 5: Pt independently moves and returns center of gravity in all planes greater than 2 inches. CH, 0% impaired. Functional Mobility: 
 
 
Functional Status Indep (I) Mod I Super-vision/CGA Min A Mod A Max A Total A Assist x2 Verbal cues Additional time Not tested Comments Rolling []  []  [] [x]    []    []  []  [] [] [] [] Supine to sit []  []  [] [x]  []  []  []  [] [] [] [] Sit to supine []  []  [] []  []  []  []  [] [] [] [x] Sit to stand []  []  [x] []  []  []  []  [] [] [] []   
Stand to sit []  []  [x] []  []  []  []  [] [] [] [] Bed to chair transfers []  []  [x] []  []  []  []  [] [] [] [] Balance Good Vonzell Dubin Poor Unable Not tested Comments Sitting static [x]  []  []  []  [] Sitting dynamic [x]  []  []  []  []   
Standing static [x]  []  []  []  []   
Standing dynamic [x]  []  []  []  [] With support Mobility/Gait:  
Level of Assistance: Stand-by assistance Assistive Device: rolling walker Distance Ambulated: 10 feet X2 Base of Support: center of gravity altered Speed/Nadia: slow Step Length: left shortened and right shortened Swing Pattern: left asymmetrical 
Stance: time Gait Abnormalities: decreased step clearance and step to gait Stairs:  
Level of Assistance: Unable at this time Vital Signs Temp: 98.4 °F (36.9 °C) Pulse (Heart Rate): 60    
BP: 131/65 Resp Rate: 16    
O2 Sat (%): 91 %Pain:Pre treatment pain level:8 Post treatment pain level:8Pain Scale 1: Numeric (0 - 10) Pain Intensity 1: 8 Pain Location 1: Leg 
Pain Orientation 1: Left;Right Pain Description 1: Shooting Pain Intervention(s) 1: Medication (see MAR) Activity Tolerance:  
Good After treatment:  
Patient left in no apparent distress sitting up in chair Call bell left within reach Nursing notified Caregiver present Marielena Pryor PTA Time Calculation: 51 mins

## 2019-01-03 NOTE — PROGRESS NOTES
Spoke with patient, and daughter in room. Patient has worked with PT today and per PT he would be ok to go home with home health. Patient stated that he wants to go home rather than rehab. Daughter stated that she will privately pay for home aide to come in to help her father and that she will be staying with her parents to ensure recovery.

## 2019-01-04 ENCOUNTER — HOME HEALTH ADMISSION (OUTPATIENT)
Dept: HOME HEALTH SERVICES | Facility: HOME HEALTH | Age: 74
End: 2019-01-04
Payer: MEDICARE

## 2019-01-04 VITALS
HEART RATE: 79 BPM | TEMPERATURE: 98.2 F | RESPIRATION RATE: 20 BRPM | BODY MASS INDEX: 20.45 KG/M2 | WEIGHT: 134.92 LBS | OXYGEN SATURATION: 95 % | DIASTOLIC BLOOD PRESSURE: 64 MMHG | HEIGHT: 68 IN | SYSTOLIC BLOOD PRESSURE: 127 MMHG

## 2019-01-04 PROCEDURE — 74011250637 HC RX REV CODE- 250/637: Performed by: SURGERY

## 2019-01-04 PROCEDURE — 77030010545

## 2019-01-04 PROCEDURE — 74011250636 HC RX REV CODE- 250/636: Performed by: SURGERY

## 2019-01-04 PROCEDURE — 97530 THERAPEUTIC ACTIVITIES: CPT

## 2019-01-04 PROCEDURE — 97535 SELF CARE MNGMENT TRAINING: CPT

## 2019-01-04 RX ORDER — GUAIFENESIN 100 MG/5ML
81 LIQUID (ML) ORAL DAILY
Qty: 90 TAB | Refills: 3 | Status: SHIPPED | OUTPATIENT
Start: 2019-01-05

## 2019-01-04 RX ORDER — OXYCODONE AND ACETAMINOPHEN 5; 325 MG/1; MG/1
1 TABLET ORAL
Qty: 25 TAB | Refills: 0 | Status: SHIPPED | OUTPATIENT
Start: 2019-01-04 | End: 2019-02-04

## 2019-01-04 RX ORDER — TAMSULOSIN HYDROCHLORIDE 0.4 MG/1
0.4 CAPSULE ORAL DAILY
Qty: 30 CAP | Refills: 3 | Status: SHIPPED | OUTPATIENT
Start: 2019-01-05

## 2019-01-04 RX ADMIN — OXYCODONE AND ACETAMINOPHEN 2 TABLET: 5; 325 TABLET ORAL at 05:33

## 2019-01-04 RX ADMIN — OXYCODONE AND ACETAMINOPHEN 2 TABLET: 5; 325 TABLET ORAL at 13:23

## 2019-01-04 RX ADMIN — ASPIRIN 81 MG 81 MG: 81 TABLET ORAL at 08:43

## 2019-01-04 RX ADMIN — FAMOTIDINE 20 MG: 20 TABLET ORAL at 08:43

## 2019-01-04 RX ADMIN — GABAPENTIN 100 MG: 100 CAPSULE ORAL at 08:43

## 2019-01-04 RX ADMIN — TAMSULOSIN HYDROCHLORIDE 0.4 MG: 0.4 CAPSULE ORAL at 08:43

## 2019-01-04 RX ADMIN — Medication 10 ML: at 06:00

## 2019-01-04 RX ADMIN — HEPARIN SODIUM 5000 UNITS: 5000 INJECTION INTRAVENOUS; SUBCUTANEOUS at 05:33

## 2019-01-04 RX ADMIN — HEPARIN SODIUM 5000 UNITS: 5000 INJECTION INTRAVENOUS; SUBCUTANEOUS at 13:17

## 2019-01-04 NOTE — PROGRESS NOTES
Problem: Self Care Deficits Care Plan (Adult) Goal: *Acute Goals and Plan of Care (Insert Text) Occupational Therapy Goals Initiated 12/31/2018 within 7 day(s). 1.  Patient will perform grooming tasks at EOB with fair+ dynamic sitting balance. 2.  Patient will perform lower body dressing with moderate assistance utilizing adaptive strategies, prn. 
3.  Patient will perform functional task in standing for 8 minutes with supervision for balance and < 3 rest breaks to increase activity tolerance for ADLs. 4.  Patient will perform toilet transfers with minimal assistance. 5.  Patient will perform all aspects of toileting with minimal assistance/contact guard assist. 
6.  Patient will participate in upper extremity therapeutic exercise/activities with supervision/set-up for 8 minutes to increase BUE strength for functional transfers and ADLs. 7.  Patient will utilize energy conservation techniques during functional activities with minimal verbal cues. Outcome: Progressing Towards Goal 
 
Occupational Therapy TREATMENT 
 
 
LATE ENTRY FOR 1/3/19 Patient: Carmenza De Souza (1 y.o. male) Date: 1/4/2019 Diagnosis: Status post arterial stent Status post arterial stent Procedure(s) (LRB): FEMORAL-POPLITEAL BYPASS  POSSIBLE THROMBECTOMY POSSIBLE ANGIO (Left) 4 Days Post-Op Precautions:   
Chart, occupational therapy assessment, plan of care, and goals were reviewed. PLOF: Independent ASSESSMENT: 
Pt OOB seated in chair upon entry. Pt seen for adaptive equipment training w/LB ADLs. Pt c/o RLE pain, increase edema and discoloration. Alerted NSG, Wilson. Pt requires increase time w/LB dressing tasks, donning/doffing socks, RLE. Pt demonstrates energy conservation technique w/LLE. Pt tolerates standing w/RW for support, requiring CGA assist and vc's to widen TESSA for improved dynamic standing balance for carry over w/clothing mgt. Pt declines return to supine. Left in chair w/BLE elevated. EDUCATION Pt educated on use of adaptive equipment w/LB dressing ADLs. Progression toward goals: 
[]          Improving appropriately and progressing toward goals [x]          Improving slowly and progressing toward goals 
[]          Not making progress toward goals and plan of care will be adjusted PLAN: 
Patient continues to benefit from skilled intervention to address the above impairments. Continue treatment per established plan of care. Discharge Recommendations:  Matt Connolly Further Equipment Recommendations for Discharge:  shower chair, RW delivered to room and adjusted SUBJECTIVE:  
Patient stated I really don't think I'll use those.  reference adaptive equipment OBJECTIVE DATA SUMMARY: 
  
G CODES:  Self Care  Current  CK= 40-59%  Goal  CJ= 20-39%. The severity rating is based on the Other functional assessmentCognitive/Behavioral Status: 
Neurologic State: Alert Orientation Level: Oriented X4 Cognition: Follows commands Safety/Judgement: Awareness of environment, Fall prevention Functional Mobility and Transfers for ADLs: 
 Bed Mobility: 
Pt OOB seated in chair upon entry Transfers: 
 Sit to stand:CGA w/RW Chair transfer:CGA w/RW Balance: 
Sitting Static:Good Dynamic:Fair Stand Static:Fair Dynamic:Fair ADL Intervention: LB dressing Socks:Min Assist w/AE Pain: 
Pre Treatment:5 Post Treatment:5 Pain Scale 1: Numeric (0 - 10) Pain Intensity 1: 5 Pain Location 1: Leg 
Pain Orientation 1: Left;Right Pain Description 1: Aching Pain Intervention(s) 1: Medication (see MAR) Activity Tolerance:   
Poor Please refer to the flowsheet for vital signs taken during this treatment. After treatment:  
[x]  Patient left in no apparent distress sitting up in chair w/BLE elevated 
[]  Patient left in no apparent distress in bed 
[x]  Call bell left within reach [x]  Nursing notified 
[]  Caregiver present 
[]  Bed alarm activated Wendy Roche Time Calculation: 23 mins

## 2019-01-04 NOTE — DISCHARGE INSTRUCTIONS
DISCHARGE SUMMARY from Nurse    PATIENT INSTRUCTIONS:    After general anesthesia or intravenous sedation, for 24 hours or while taking prescription Narcotics:  · Limit your activities  · Do not drive and operate hazardous machinery  · Do not make important personal or business decisions  · Do  not drink alcoholic beverages  · If you have not urinated within 8 hours after discharge, please contact your surgeon on call. Report the following to your surgeon:  · Excessive pain, swelling, redness or odor of or around the surgical area  · Temperature over 100.5  · Nausea and vomiting lasting longer than 4 hours or if unable to take medications  · Any signs of decreased circulation or nerve impairment to extremity: change in color, persistent  numbness, tingling, coldness or increase pain  · Any questions    What to do at Home:  Recommended activity: Activity as tolerated. If you experience any of the symptoms above, please follow up with Dr. Josefina Thompson. *  Please give a list of your current medications to your Primary Care Provider. *  Please update this list whenever your medications are discontinued, doses are      changed, or new medications (including over-the-counter products) are added. *  Please carry medication information at all times in case of emergency situations. These are general instructions for a healthy lifestyle:    No smoking/ No tobacco products/ Avoid exposure to second hand smoke  Surgeon General's Warning:  Quitting smoking now greatly reduces serious risk to your health.     Obesity, smoking, and sedentary lifestyle greatly increases your risk for illness    A healthy diet, regular physical exercise & weight monitoring are important for maintaining a healthy lifestyle    You may be retaining fluid if you have a history of heart failure or if you experience any of the following symptoms:  Weight gain of 3 pounds or more overnight or 5 pounds in a week, increased swelling in our hands or feet or shortness of breath while lying flat in bed. Please call your doctor as soon as you notice any of these symptoms; do not wait until your next office visit. Recognize signs and symptoms of STROKE:    F-face looks uneven    A-arms unable to move or move unevenly    S-speech slurred or non-existent    T-time-call 911 as soon as signs and symptoms begin-DO NOT go       Back to bed or wait to see if you get better-TIME IS BRAIN. Warning Signs of HEART ATTACK     Call 911 if you have these symptoms:   Chest discomfort. Most heart attacks involve discomfort in the center of the chest that lasts more than a few minutes, or that goes away and comes back. It can feel like uncomfortable pressure, squeezing, fullness, or pain.  Discomfort in other areas of the upper body. Symptoms can include pain or discomfort in one or both arms, the back, neck, jaw, or stomach.  Shortness of breath with or without chest discomfort.  Other signs may include breaking out in a cold sweat, nausea, or lightheadedness. Don't wait more than five minutes to call 911 - MINUTES MATTER! Fast action can save your life. Calling 911 is almost always the fastest way to get lifesaving treatment. Emergency Medical Services staff can begin treatment when they arrive -- up to an hour sooner than if someone gets to the hospital by car. The discharge information has been reviewed with the patient. The patient verbalized understanding. Discharge medications reviewed with the patient and appropriate educational materials and side effects teaching were provided. Patient armband removed and shredded.   ___________________________________________________________________________________________________________________________________

## 2019-01-04 NOTE — PROGRESS NOTES
Home care referral sent to [facility] via [facility] and called to intake nurse,  Ag Dos Santos.

## 2019-01-04 NOTE — DISCHARGE SUMMARY
Physician Discharge Summary     Patient ID:  Joshua William  [id number]  49 y.o.  1945    Admit date: 12/26/2018    Discharge date: 1/4/2019      Admitting Physician: Terri Boston MD     Discharge Physician: Nita Whitaker MD    Admission Diagnoses: Status post arterial stent    Discharge Diagnoses: severe PVD     Procedures for this admission: Procedure(s):  12/28/18 R femoral artery to BK popliteal artery vein bypass   12/31/18 R femoral artery to BK popliteal artery bypass exploration - partial thrombectom/ non salvageable. Discharged Condition: stable    Hospital Course: H/o R popliteal artery occlusion with atherectomy/ stent placement mid DEC18. Found to be occluded. Had the above surgery Friday, 12/18/18, patent 2 days on therapeutic heparin, found to be occluded POD#3. Explored - distal vein disruption/stenosis, found to be non salvageable. POD#1-2 significant disorientation, improved  With medication change. Observed in ICU for hypotension after exploration requiring low level dopamine. Normal cardia enzymes. Transferred to floor. Up with physical therapy, standing with  short distance claudication. Urinary retention with replacment of noriega - will be discharged with leg bag. Consults: hospitalist    Significant Diagnostic Studies: none    Treatments: none    Vital Signs:   Visit Vitals  /64   Pulse 79   Temp 98.2 °F (36.8 °C)   Resp 20   Ht 5' 8\" (1.727 m)   Wt 61.2 kg (134 lb 14.7 oz)   SpO2 95%   BMI 20.51 kg/m²       Discharge Exam:   Gen - A&Ox3, chronically ill  LUNG - relatively clear, decreased bases  HEART: regular  ABD: soft, NT, NABS  LE : incisions all intact, small amount/thin rim of skin eschar lower leg, wound supple, minimally tender, R foot relatively warm, with capillary refill, old ecchymosis starting to clear. NEURO: no focal deficits, globally somewhat weak/deconditioned. Disposition: home wit daughter, Home health PT.      Patient Instructions:   Current Discharge Medication List      CONTINUE these medications which have NOT CHANGED    Details   budesonide-formoterol (SYMBICORT) 160-4.5 mcg/actuation HFAA Take 2 Puffs by inhalation two (2) times a day. losartan-hydrochlorothiazide (HYZAAR) 100-25 mg per tablet Take 1 Tab by mouth daily. escitalopram oxalate (LEXAPRO) 5 mg tablet Take 5 mg by mouth daily. diazePAM (VALIUM) 5 mg tablet Take 5 mg by mouth every eight (8) hours as needed for Anxiety. rosuvastatin (CRESTOR) 40 mg tablet Take 40 mg by mouth nightly. temazepam (RESTORIL) 30 mg capsule Take 30 mg by mouth nightly as needed for Sleep. Stop lexapro. Aspirin 81mg/day orally  FLomax 0.4mg/day orally  Percocet 5/325 1 tab by mouth every 4 hours as needed for pain. Reference discharge instructions as provided by nursing for diet, labs, medications, activity, wound care and any outpatient referrals. Follow-up with Dr. Prisca Masterson, 1/8/19. Office will call to arrange. Echo Winchester.  Marcelina Fu, [address] [facility]  Cell - [phone]  January 4, 2019  1:41 PM

## 2019-01-04 NOTE — PROGRESS NOTES
Problem: Self Care Deficits Care Plan (Adult) Goal: *Acute Goals and Plan of Care (Insert Text) Occupational Therapy Goals Initiated 12/31/2018 within 7 day(s). 1.  Patient will perform grooming tasks at EOB with fair+ dynamic sitting balance. 2.  Patient will perform lower body dressing with moderate assistance utilizing adaptive strategies, prn. 
3.  Patient will perform functional task in standing for 8 minutes with supervision for balance and < 3 rest breaks to increase activity tolerance for ADLs. 4.  Patient will perform toilet transfers with minimal assistance. 5.  Patient will perform all aspects of toileting with minimal assistance/contact guard assist. 
6.  Patient will participate in upper extremity therapeutic exercise/activities with supervision/set-up for 8 minutes to increase BUE strength for functional transfers and ADLs. 7.  Patient will utilize energy conservation techniques during functional activities with minimal verbal cues. Outcome: Progressing Towards Goal 
Occupational Therapy TREATMENT Patient: Indio Arora (23 y.o. male) Date: 1/4/2019 Diagnosis: Status post arterial stent Status post arterial stent Procedure(s) (LRB): FEMORAL-POPLITEAL BYPASS  POSSIBLE THROMBECTOMY POSSIBLE ANGIO (Left) 4 Days Post-Op Precautions:   
Chart, occupational therapy assessment, plan of care, and goals were reviewed. PLOF: Independent ASSESSMENT: 
Pt OOB seated in chair upon entry. Supportive spouse at bedside, although pt appears to be very agitated w/her presence. Mateus Becerra, altered to this therapist pt d/c w/Ramirez catheter. Pt educated on donning/doffing leg bag for increase independence w/LB dressing. Pt requires increase time w/adaptive equipment donning underwear and Min Assist threading BLE into pants opening 2/2 c/o pain w/movement and decrease ROM RLE.  Pt requires Min Assist w/functional transfer to standing and decrease dynamic standing balance requires Min Assist w/clothing mgt. Issued reacher. EDUCATION Continued education w/adaptive equipment and LB dressing tasks Progression toward goals: 
[]          Improving appropriately and progressing toward goals [x]          Improving slowly and progressing toward goals 
[]          Not making progress toward goals and plan of care will be adjusted PLAN: 
Patient continues to benefit from skilled intervention to address the above impairments. Continue treatment per established plan of care. Discharge Recommendations:  Matt Connolly Further Equipment Recommendations for Discharge:  shower chair SUBJECTIVE:  
Patient stated Can you help me pack up all my stuff?  OBJECTIVE DATA SUMMARY: 
  
G CODES:  Self Care  Current  CJ= 20-39%  Goal  CK= 40-59%. The severity rating is based on the Other functional assessmentCognitive/Behavioral Status: 
Neurologic State: Alert, Agitated Orientation Level: Oriented X4 Cognition: Impulsive Safety/Judgement: Awareness of environment, Fall prevention Functional Mobility and Transfers for ADLs: 
 Transfers: 
Sit to Stand: Minimum assistance(w/RW) Bed to Chair: Minimum assistance(w/RW) Balance: 
Sitting: Intact Standing: Impaired; With support Standing - Static: Fair Standing - Dynamic : Fair ADL Intervention: Lower Body Dressing Assistance Underpants: Min Assist 
Pants With Elastic Waist: Minimum assistance Leg Crossed Method Used: No 
Position Performed: Seated in chair Cues: Marguerita Awan Adaptive Equipment Used: Reacher Pain: 
Pre Treatment:0 Post Treatment:0 Pain Scale 1: Numeric (0 - 10) Pain Intensity 1: 7 Pain Location 1: Leg 
Pain Orientation 1: Left;Right Pain Description 1: Aching Pain Intervention(s) 1: Medication (see MAR) Pt c/o pain in standing Activity Tolerance:   
Fair Please refer to the flowsheet for vital signs taken during this treatment. After treatment: [x]  Patient left in no apparent distress sitting up in chair 
[]  Patient left in no apparent distress in bed 
[x]  Call bell left within reach 
[]  Nursing notified 
[]  Caregiver present 
[]  Bed alarm activated Andrew Pemberton Time Calculation: 25 mins

## 2019-01-04 NOTE — PROGRESS NOTES
Attempted to give Pulmicort and Brovana. Pt stated that he \"cancelled that 2 days ago. \" I confirmed with him that he did not want a breathing treatment at this time and he said he did not want one at all. I will reach out to MD to make them PRN or D/C the orders.

## 2019-01-04 NOTE — PROGRESS NOTES
Problem: Falls - Risk of 
Goal: *Absence of Falls Document Mckenna Cardona Fall Risk and appropriate interventions in the flowsheet. Outcome: Progressing Towards Goal 
Fall Risk Interventions: 
Mobility Interventions: Patient to call before getting OOB Medication Interventions: Patient to call before getting OOB, Evaluate medications/consider consulting pharmacy, Teach patient to arise slowly Elimination Interventions: Call light in reach, Patient to call for help with toileting needs, Urinal in reach Problem: Pressure Injury - Risk of 
Goal: *Prevention of pressure injury Document Gee Scale and appropriate interventions in the flowsheet. Outcome: Progressing Towards Goal 
Pressure Injury Interventions: 
Sensory Interventions: Assess changes in LOC, Keep linens dry and wrinkle-free Moisture Interventions: Absorbent underpads Activity Interventions: Increase time out of bed Mobility Interventions: HOB 30 degrees or less Nutrition Interventions: Document food/fluid/supplement intake Friction and Shear Interventions: HOB 30 degrees or less

## 2019-01-04 NOTE — PROGRESS NOTES
Care Management Interventions PCP Verified by CM: Yes(Avelino Ely(saw 3-4 weeks ago)) Mode of Transport at Discharge: Self(Spouse will transport home) Transition of Care Consult (CM Consult): Home Health [facility] - INPATIENT: Yes Current Support Network: Lives with Spouse Confirm Follow Up Transport: Self Plan discussed with Pt/Family/Caregiver: Yes Freedom of Choice Offered: Yes Discharge Location Discharge Placement: Home with home health

## 2019-01-04 NOTE — PROGRESS NOTES
Problem: Mobility Impaired (Adult and Pediatric) Goal: *Acute Goals and Plan of Care (Insert Text) Physical Therapy Goals Initiated 12/30/2018 and to be accomplished within 7 day(s) 1. Patient will move from supine to sit and sit to supine , scoot up and down and roll side to side in bed with minimal assistance/contact guard assist.    
2.  Patient will transfer from bed to chair and chair to bed with minimal assistance/contact guard assist using the least restrictive device. 3.  Patient will perform sit to stand with minimal assistance/contact guard assist. 
4.  Patient will ambulate with minimal assistance/contact guard assist for >/= 75 feet with the least restrictive device. 5.  Patient will demonstrate independence with performance of home exercise program. 
  
Outcome: Progressing Towards Goal 
 
PHYSICAL THERAPY: Daily TREATMENT Note INPATIENT: Medicare: Hospital Day: 10 Patient: Claudia Kyle (89 y.o. male)    Date: 1/4/2019 Primary Diagnosis: Status post arterial stent Procedure(s) (LRB): FEMORAL-POPLITEAL BYPASS  POSSIBLE THROMBECTOMY POSSIBLE ANGIO (Left), 4 Days Post-Op, Precautions:   
 
 
Chart, physical therapy assessment, plan of care and goals were reviewed. PLOF: 
 
ASSESSMENT: 
Pt continues to demonstrate good progress towards goals this morning. Required min a with R LE for transfer to seated EOB, SBA for sit<>stand with verbal cues for hand placement. Pt progressing with gait training X22 ft, initially with shortened stiff steps which improved with gait progress. Progression toward goals: 
      Improving appropriately and progressing toward goals PLAN: 
Patient continues to benefit from skilled intervention to address the above impairments. Continue treatment per established plan of care. EDUCATION:  
Education:  Patient was educated on the following topics: activity recommendations Barriers to Learning/Limitations: None Compensate with: visual, verbal, tactile, kinesthetic cues/model Discharge Recommendations:  Home Health Further Equipment Recommendations for Discharge:  N/A- pt has received RW Factors which may impact discharge planning: none SUBJECTIVE:  
Patient stated It will be good to get home, nothing is going right here.  OBJECTIVE DATA SUMMARY:  
Critical Behavior: 
Neurologic State: Alert Orientation Level: Oriented X4 Cognition: Follows commands Safety/Judgement: Awareness of environment, Fall prevention G CODE:Mobility [id number] Current  CL= 60-79%   Goal  CK= 40-59%. The severity rating is based on the Other KUSBS [address] Standing Balance Scale 
0: Pt performs 25% or less of standing activity (Max assist) CN, 100% impaired. 1: Pt supports self with upper extremities but requires therapist assistance. Pt performs 25-50% of effort (Mod assist) CM, 80% to <100% impaired. 1+: Pt supports self with upper extremities but requires therapist assistance. Pt performs >50% effort. (Min assist). CL, 60% to <80% impaired. 2: Pt supports self independently with both upper extremities (walker, crutches, parallel bars). CL, 60% to <80% impaired. 2+: Pt support self independently with 1 upper extremity (cane, crutch, 1 parallel bar). CK, 40% to <60% impaired. 3: Pt stands without upper extremity support for up to 30 seconds. CK, 40% to <60% impaired. 3+: Pt stands without upper extremity support for 30 seconds or greater. CJ, 20% to <40% impaired. 4: Pt independently moves and returns center of gravity 1-2 inches in one plane. CJ, 20% to <40% impaired. 4+: Pt independently moves and returns center of gravity 1-2 inches in multiple planes. CI, 1% to <20% impaired. 5: Pt independently moves and returns center of gravity in all planes greater than 2 inches. CH, 0% impaired. Functional Mobility: 
 
 
Functional Status Indep (I) Mod I Super-vision Min A Mod A Max A Total A  
 Assist x2 Verbal cues Additional time Not tested Comments Rolling []  []  [] [x]    []    []  []  [] [x] [x] [] Supine to sit []  []  [x] []  []  []  []  [] [x] [x] [] Sit to supine []  []  [] []  []  []  []  [] [] [] [x] Sit to stand []  []  [x] []  []  []  []  [] [x] [x] [] Stand to sit []  []  [x] []  []  []  []  [] [x] [x] [] Bed to chair transfers []  []  [x] []  []  []  []  [] [x] [x] [] Balance Good Lolita Neftaly Poor Unable Not tested Comments Sitting static [x]  []  []  []  [] Sitting dynamic [x]  []  []  []  []   
Standing static [x]  []  []  []  []   
Standing dynamic [x]  []  []  []  [] With support Mobility/Gait:  
Level of Assistance: Supervision Assistive Device: rolling walker Distance Ambulated: 22 feet Base of Support: center of gravity altered Speed/Nadia: pace decreased (<100 feet/min) Step Length: left shortened and right shortened Swing Pattern: left asymmetrical 
Stance: right increased Gait Abnormalities: decreased step clearance and step to gait Stairs:  
Level of Assistance: pt reports no need for stair training Vital Signs Temp: 98.6 °F (37 °C) Pulse (Heart Rate): 65    
BP: 131/54 Resp Rate: 17    
O2 Sat (%): 93 %Pain:Pre treatment pain level:2 Post treatment pain level:2Pain Scale 1: Numeric (0 - 10) Pain Intensity 1: 2 Pain Location 1: Leg 
Pain Orientation 1: Left;Right Pain Description 1: Aching Pain Intervention(s) 1: Medication (see MAR) Activity Tolerance:  
Good After treatment:  
Patient left in no apparent distress sitting up in chair Call bell left within reach Nursing notified Caregiver present Loma Mood, PTA Time Calculation: 24 mins

## 2019-01-04 NOTE — PROGRESS NOTES
Bedside shift report received from [address] With sbar, patient in bed no complaints of pain 
0900 appetite poor 
positive pulses with doppler 
oob to chair by physio Family in room  in to see patient Discharged via wc

## 2019-01-04 NOTE — PROGRESS NOTES
Received bedside and verbal shift report from [facility] w/sbar. Pt in the bed alert and stable using urinal attempting to void but unable per report pt dtv by 2000. NAD noted call bell within reach will continue to monitor. 2148 Unable to void so far BS shows 427 mL will place noriega per order, pt reported feeling to void but unable. NAD noted call bell within reach. 2[address] with secondary RN assist, pt tolerated procedure w/out complaint o/p 420 mL. Voiced feeling much better call bell within reach. 0019 Sleeping soundly NAD noted call bell within reach will continue to monitor. 0533 Uneventful shift voiced no complaint noriega draining w/out issue. Medicated for pain as needed NAD noted call bell within reach. Bedside and Verbal shift change report given to Ulysses Hennessy (oncoming nurse) by Meagan Solis RN (offgoing nurse). Report included the following information SBAR, Kardex, Intake/Output, MAR and Recent Results.

## 2019-01-04 NOTE — PROGRESS NOTES
[address] [place], [place] [address] Daily progress Note Patient: Nini De Anda MRN: [id number]  CSN: [id number] YOB: 1945  Age: 74 y.o. Sex: male DOA: 12/26/2018 LOS:  LOS: 9 days Assessment/Plan Principal Problem: 
  Status post arterial stent (12/26/2018) Active Problems: 
  High cholesterol (12/26/2018) Hypertension (12/26/2018) Peripheral arterial disease ([facility] [address] (12/26/2018) COPD (chronic obstructive pulmonary disease) ([facility] [address] (12/26/2018) Insomnia (12/26/2018) Postprocedural hypotension (1/1/2019) - Diet and mobilization per primary team 
- Pain control PRN 
- PT/OT 
- Hypotension - resolved. BP stable. Would discharge off antihypertensives, can f/u with PCP to see if he needs to be restarted. - Vasc surgery to d/c patient with close follow-up 
- Urinary retention 2/2 BPH - noriega placed, will need f/u with Urology for TOV 
- Cont acceptable home medications for chronic conditions  
- DVT protocol: heparin sq  
  
I have personally reviewed all pertinent labs and films that have officially resulted over the last 24 hours. I have personally checked for all pending labs that are awaiting final results. Subjective / Interval History:  
 
Doing well. Walking around with PT with walker. Wants to go home. Objective:  
  
 
Visit Vitals /54 Pulse 65 Temp 98.6 °F (37 °C) Resp 17 Ht 5' 8\" (1.727 m) Wt 61.2 kg (134 lb 14.7 oz) SpO2 93% BMI 20.51 kg/m² Physical Exam: 
Visit Vitals /54 Pulse 65 Temp 98.6 °F (37 °C) Resp 17 Ht 5' 8\" (1.727 m) Wt 61.2 kg (134 lb 14.7 oz) SpO2 93% BMI 20.51 kg/m² General appearance: alert, cooperative, no distress, appears stated age HEENT: negative Neck: supple, symmetrical, trachea midline, no adenopathy, thyroid: not enlarged, symmetric, no tenderness/mass/nodules, no carotid bruit and no JVD Lungs: clear to auscultation bilaterally Heart: regular rate and rhythm, S1, S2 normal, no murmur, click, rub or gallop Abdomen: soft, non-tender. Bowel sounds normal. No masses,  no organomegaly Skin: skin discoloration R leg & foot - dusky & bluish Ext: R foot warm, serous drainage in RENUKA Intake and Output: 
Current Shift:  No intake/output data recorded. Last three shifts:  01/02 1901 - 01/04 0700 In: 1200 [P.O.:1200] Out: 2100 [Urine:2100] No results found for this or any previous visit (from the past 24 hour(s)). Current Facility-Administered Medications:  
  tamsulosin (FLOMAX) capsule 0.4 mg, 0.4 mg, Oral, DAILY, Mandy Hobson MD, 0.4 mg at 01/04/19 6044   oxyCODONE-acetaminophen (PERCOCET) 5-325 mg per tablet 2 Tab, 2 Tab, Oral, Q4H PRN, Mandy Hobson MD, 2 Tab at 01/04/19 0533 
  gabapentin (NEURONTIN) capsule 100 mg, 100 mg, Oral, TID, Mandy Hobson MD, 100 mg at 01/04/19 7587   heparin (porcine) injection 5,000 Units, 5,000 Units, SubCUTAneous, Q8H, [facility]Ohio State Hard[facility] HospitalMallory MD, 5,000 Units at 01/04/19 0533 
  oxyCODONE-acetaminophen (PERCOCET) 5-325 mg per tablet 1 Tab, 1 Tab, Oral, Q4H PRN, Елена Martinez MD, 1 Tab at 01/03/19 2141   famotidine (PEPCID) tablet 20 mg, 20 mg, Oral, BID, Deschamplashley, Emily Harris MD, 20 mg at 01/04/19 0843 
  haloperidol lactate (HALDOL) injection 2 mg, 2 mg, IntraVENous, Q2H PRN, Precious Archer DO, 2 mg at 12/30/18 8894   aspirin chewable tablet 81 mg, 81 mg, Oral, DAILY, [facility] Hospital[facility]areed MD, 81 mg at 01/04/19 9883   zolpidem (AMBIEN) tablet 5 mg, 5 mg, Oral, QHS PRN, Fareed Tapia MD, 5 mg at 01/03/19 2141 
  melatonin tablet 1.5 mg, 1.5 mg, Oral, QHS PRN, Fareed Tapia MD 
  0.9% sodium chloride infusion 250 mL, 250 mL, IntraVENous, PRN, Bj Mo MD 
  sodium chloride (NS) flush 5-10 mL, 5-10 mL, IntraVENous, Q8H, Fadi Hammond MD, 10 mL at 01/04/19 0600 
  sodium chloride (NS) flush 5-10 mL, 5-10 mL, IntraVENous, PRN, Philip Taylor MD, 10 mL at 12/28/18 2109   acetaminophen (TYLENOL) tablet 650 mg, 650 mg, Oral, Q4H PRN, Philip Taylor MD 
  naloxone [facility]) injection 0.4 mg, 0.4 mg, IntraVENous, PRN, Philip Taylor MD 
  ondansetron (ZOFRAN) injection 4 mg, 4 mg, IntraVENous, Q4H PRN, Philip Taylor MD 
  diazePAM (VALIUM) tablet 5 mg, 5 mg, Oral, Q8H PRN, Nieves Fortune DO 
  rosuvastatin (CRESTOR) tablet 40 mg, 40 mg, Oral, QHS, Ofilia Raquel H, DO, 40 mg at 01/03/19 2142   budesonide (PULMICORT) 500 mcg/2 ml nebulizer suspension, 500 mcg, Nebulization, BID RT, Nieves Fortune DO, 500 mcg at 01/01/19 2035   arformoterol (BROVANA) neb solution 15 mcg, 15 mcg, Nebulization, BID RT, Nieves Fortune DO, 15 mcg at 01/01/19 2035 Lab Results Component Value Date/Time  Glucose 119 (H) 01/02/2019 03:00 AM  
 Glucose 138 (H) 01/01/2019 02:40 AM  
 Glucose 118 (H) 12/29/2018 01:15 AM  
 Glucose 89 12/26/2018 02:05 PM  
 Glucose 90 05/19/2015 12:49 PM  
  
 
 
 
 
 
 
Tara Dubose MD 
1/4/2019, 4:42 PM

## 2019-01-06 ENCOUNTER — HOME CARE VISIT (OUTPATIENT)
Dept: SCHEDULING | Facility: HOME HEALTH | Age: 74
End: 2019-01-06
Payer: MEDICARE

## 2019-01-06 VITALS
OXYGEN SATURATION: 98 % | HEIGHT: 68 IN | SYSTOLIC BLOOD PRESSURE: 120 MMHG | DIASTOLIC BLOOD PRESSURE: 60 MMHG | BODY MASS INDEX: 20.31 KG/M2 | WEIGHT: 134 LBS | TEMPERATURE: 97 F | HEART RATE: 76 BPM | RESPIRATION RATE: 16 BRPM

## 2019-01-06 PROCEDURE — G0299 HHS/HOSPICE OF RN EA 15 MIN: HCPCS

## 2019-01-06 PROCEDURE — 3331090002 HH PPS REVENUE DEBIT

## 2019-01-06 PROCEDURE — 400013 HH SOC

## 2019-01-06 PROCEDURE — 3331090001 HH PPS REVENUE CREDIT

## 2019-01-07 ENCOUNTER — HOME CARE VISIT (OUTPATIENT)
Dept: HOME HEALTH SERVICES | Facility: HOME HEALTH | Age: 74
End: 2019-01-07
Payer: MEDICARE

## 2019-01-07 PROCEDURE — 3331090001 HH PPS REVENUE CREDIT

## 2019-01-07 PROCEDURE — A5120 SKIN BARRIER, WIPE OR SWAB: HCPCS

## 2019-01-07 PROCEDURE — A6212 FOAM DRG <=16 SQ IN W/BORDER: HCPCS

## 2019-01-07 PROCEDURE — A6216 NON-STERILE GAUZE<=16 SQ IN: HCPCS

## 2019-01-07 PROCEDURE — A6260 WOUND CLEANSER ANY TYPE/SIZE: HCPCS

## 2019-01-07 PROCEDURE — A4450 NON-WATERPROOF TAPE: HCPCS

## 2019-01-07 PROCEDURE — 3331090002 HH PPS REVENUE DEBIT

## 2019-01-07 PROCEDURE — A6197 ALGINATE DRSG >16 <=48 SQ IN: HCPCS

## 2019-01-07 PROCEDURE — A6252 ABSORPT DRG >16 <=48 W/O BDR: HCPCS

## 2019-01-08 ENCOUNTER — HOME CARE VISIT (OUTPATIENT)
Dept: SCHEDULING | Facility: HOME HEALTH | Age: 74
End: 2019-01-08
Payer: MEDICARE

## 2019-01-08 VITALS
HEART RATE: 88 BPM | DIASTOLIC BLOOD PRESSURE: 74 MMHG | SYSTOLIC BLOOD PRESSURE: 159 MMHG | TEMPERATURE: 99.8 F | OXYGEN SATURATION: 92 % | RESPIRATION RATE: 16 BRPM

## 2019-01-08 PROCEDURE — 3331090002 HH PPS REVENUE DEBIT

## 2019-01-08 PROCEDURE — G0151 HHCP-SERV OF PT,EA 15 MIN: HCPCS

## 2019-01-08 PROCEDURE — 3331090001 HH PPS REVENUE CREDIT

## 2019-01-09 PROCEDURE — 3331090001 HH PPS REVENUE CREDIT

## 2019-01-09 PROCEDURE — 3331090002 HH PPS REVENUE DEBIT

## 2019-01-10 ENCOUNTER — HOME CARE VISIT (OUTPATIENT)
Dept: SCHEDULING | Facility: HOME HEALTH | Age: 74
End: 2019-01-10
Payer: MEDICARE

## 2019-01-10 VITALS
HEART RATE: 68 BPM | RESPIRATION RATE: 16 BRPM | SYSTOLIC BLOOD PRESSURE: 128 MMHG | OXYGEN SATURATION: 95 % | DIASTOLIC BLOOD PRESSURE: 58 MMHG

## 2019-01-10 VITALS — TEMPERATURE: 98.1 F | OXYGEN SATURATION: 93 % | HEART RATE: 67 BPM

## 2019-01-10 PROCEDURE — G0157 HHC PT ASSISTANT EA 15: HCPCS

## 2019-01-10 PROCEDURE — G0152 HHCP-SERV OF OT,EA 15 MIN: HCPCS

## 2019-01-10 PROCEDURE — G0299 HHS/HOSPICE OF RN EA 15 MIN: HCPCS

## 2019-01-10 PROCEDURE — 3331090002 HH PPS REVENUE DEBIT

## 2019-01-10 PROCEDURE — 3331090001 HH PPS REVENUE CREDIT

## 2019-01-11 PROCEDURE — 3331090002 HH PPS REVENUE DEBIT

## 2019-01-11 PROCEDURE — 3331090001 HH PPS REVENUE CREDIT

## 2019-01-12 ENCOUNTER — HOME CARE VISIT (OUTPATIENT)
Dept: SCHEDULING | Facility: HOME HEALTH | Age: 74
End: 2019-01-12
Payer: MEDICARE

## 2019-01-12 PROCEDURE — 3331090001 HH PPS REVENUE CREDIT

## 2019-01-12 PROCEDURE — 3331090002 HH PPS REVENUE DEBIT

## 2019-01-12 PROCEDURE — G0299 HHS/HOSPICE OF RN EA 15 MIN: HCPCS

## 2019-01-13 PROCEDURE — 3331090001 HH PPS REVENUE CREDIT

## 2019-01-13 PROCEDURE — 3331090002 HH PPS REVENUE DEBIT

## 2019-01-14 ENCOUNTER — HOME CARE VISIT (OUTPATIENT)
Dept: SCHEDULING | Facility: HOME HEALTH | Age: 74
End: 2019-01-14
Payer: MEDICARE

## 2019-01-14 VITALS
OXYGEN SATURATION: 92 % | DIASTOLIC BLOOD PRESSURE: 68 MMHG | SYSTOLIC BLOOD PRESSURE: 126 MMHG | HEART RATE: 88 BPM | TEMPERATURE: 97.2 F

## 2019-01-14 VITALS
TEMPERATURE: 97.8 F | OXYGEN SATURATION: 94 % | DIASTOLIC BLOOD PRESSURE: 60 MMHG | HEART RATE: 83 BPM | SYSTOLIC BLOOD PRESSURE: 100 MMHG | RESPIRATION RATE: 16 BRPM

## 2019-01-14 VITALS
OXYGEN SATURATION: 95 % | DIASTOLIC BLOOD PRESSURE: 68 MMHG | SYSTOLIC BLOOD PRESSURE: 118 MMHG | TEMPERATURE: 96.7 F | HEART RATE: 74 BPM | RESPIRATION RATE: 20 BRPM

## 2019-01-14 PROCEDURE — A6252 ABSORPT DRG >16 <=48 W/O BDR: HCPCS

## 2019-01-14 PROCEDURE — 3331090002 HH PPS REVENUE DEBIT

## 2019-01-14 PROCEDURE — A6449 LT COMPRES BAND >=3" <5"/YD: HCPCS

## 2019-01-14 PROCEDURE — A6216 NON-STERILE GAUZE<=16 SQ IN: HCPCS

## 2019-01-14 PROCEDURE — A4927 NON-STERILE GLOVES: HCPCS

## 2019-01-14 PROCEDURE — A6260 WOUND CLEANSER ANY TYPE/SIZE: HCPCS

## 2019-01-14 PROCEDURE — A6454 SELF-ADHER BAND W>=3" <5"/YD: HCPCS

## 2019-01-14 PROCEDURE — A6446 CONFORM BAND S W>=3" <5"/YD: HCPCS

## 2019-01-14 PROCEDURE — G0157 HHC PT ASSISTANT EA 15: HCPCS

## 2019-01-14 PROCEDURE — G0299 HHS/HOSPICE OF RN EA 15 MIN: HCPCS

## 2019-01-14 PROCEDURE — A4452 WATERPROOF TAPE: HCPCS

## 2019-01-14 PROCEDURE — 3331090001 HH PPS REVENUE CREDIT

## 2019-01-15 ENCOUNTER — HOME CARE VISIT (OUTPATIENT)
Dept: SCHEDULING | Facility: HOME HEALTH | Age: 74
End: 2019-01-15
Payer: MEDICARE

## 2019-01-15 PROCEDURE — 3331090001 HH PPS REVENUE CREDIT

## 2019-01-15 PROCEDURE — 3331090002 HH PPS REVENUE DEBIT

## 2019-01-15 PROCEDURE — G0152 HHCP-SERV OF OT,EA 15 MIN: HCPCS

## 2019-01-16 ENCOUNTER — HOME CARE VISIT (OUTPATIENT)
Dept: SCHEDULING | Facility: HOME HEALTH | Age: 74
End: 2019-01-16
Payer: MEDICARE

## 2019-01-16 PROCEDURE — G0299 HHS/HOSPICE OF RN EA 15 MIN: HCPCS

## 2019-01-16 PROCEDURE — 3331090001 HH PPS REVENUE CREDIT

## 2019-01-16 PROCEDURE — 3331090002 HH PPS REVENUE DEBIT

## 2019-01-17 ENCOUNTER — HOME CARE VISIT (OUTPATIENT)
Dept: SCHEDULING | Facility: HOME HEALTH | Age: 74
End: 2019-01-17
Payer: MEDICARE

## 2019-01-17 VITALS
SYSTOLIC BLOOD PRESSURE: 95 MMHG | TEMPERATURE: 98.4 F | HEART RATE: 76 BPM | RESPIRATION RATE: 16 BRPM | DIASTOLIC BLOOD PRESSURE: 57 MMHG | OXYGEN SATURATION: 98 %

## 2019-01-17 VITALS
TEMPERATURE: 98.1 F | OXYGEN SATURATION: 95 % | DIASTOLIC BLOOD PRESSURE: 66 MMHG | SYSTOLIC BLOOD PRESSURE: 128 MMHG | HEART RATE: 88 BPM

## 2019-01-17 VITALS
DIASTOLIC BLOOD PRESSURE: 72 MMHG | HEART RATE: 86 BPM | OXYGEN SATURATION: 98 % | RESPIRATION RATE: 18 BRPM | TEMPERATURE: 97.5 F | SYSTOLIC BLOOD PRESSURE: 130 MMHG

## 2019-01-17 PROCEDURE — 3331090001 HH PPS REVENUE CREDIT

## 2019-01-17 PROCEDURE — G0157 HHC PT ASSISTANT EA 15: HCPCS

## 2019-01-17 PROCEDURE — G0299 HHS/HOSPICE OF RN EA 15 MIN: HCPCS

## 2019-01-17 PROCEDURE — 3331090002 HH PPS REVENUE DEBIT

## 2019-01-17 PROCEDURE — G0158 HHC OT ASSISTANT EA 15: HCPCS

## 2019-01-18 VITALS
SYSTOLIC BLOOD PRESSURE: 118 MMHG | HEART RATE: 88 BPM | OXYGEN SATURATION: 96 % | TEMPERATURE: 97.8 F | RESPIRATION RATE: 16 BRPM | DIASTOLIC BLOOD PRESSURE: 78 MMHG

## 2019-01-18 PROCEDURE — 3331090002 HH PPS REVENUE DEBIT

## 2019-01-18 PROCEDURE — 3331090001 HH PPS REVENUE CREDIT

## 2019-01-19 PROCEDURE — 3331090002 HH PPS REVENUE DEBIT

## 2019-01-19 PROCEDURE — 3331090001 HH PPS REVENUE CREDIT

## 2019-01-20 PROCEDURE — 3331090002 HH PPS REVENUE DEBIT

## 2019-01-20 PROCEDURE — 3331090001 HH PPS REVENUE CREDIT

## 2019-01-21 ENCOUNTER — HOME CARE VISIT (OUTPATIENT)
Dept: HOME HEALTH SERVICES | Facility: HOME HEALTH | Age: 74
End: 2019-01-21
Payer: MEDICARE

## 2019-01-21 PROCEDURE — 3331090001 HH PPS REVENUE CREDIT

## 2019-01-21 PROCEDURE — 3331090002 HH PPS REVENUE DEBIT

## 2019-01-21 PROCEDURE — G0299 HHS/HOSPICE OF RN EA 15 MIN: HCPCS

## 2019-01-22 ENCOUNTER — HOME CARE VISIT (OUTPATIENT)
Dept: SCHEDULING | Facility: HOME HEALTH | Age: 74
End: 2019-01-22
Payer: MEDICARE

## 2019-01-22 VITALS
DIASTOLIC BLOOD PRESSURE: 72 MMHG | HEART RATE: 78 BPM | RESPIRATION RATE: 16 BRPM | SYSTOLIC BLOOD PRESSURE: 128 MMHG | OXYGEN SATURATION: 97 % | TEMPERATURE: 97.8 F

## 2019-01-22 PROCEDURE — G0157 HHC PT ASSISTANT EA 15: HCPCS

## 2019-01-22 PROCEDURE — 3331090002 HH PPS REVENUE DEBIT

## 2019-01-22 PROCEDURE — 3331090001 HH PPS REVENUE CREDIT

## 2019-01-23 ENCOUNTER — HOME CARE VISIT (OUTPATIENT)
Dept: SCHEDULING | Facility: HOME HEALTH | Age: 74
End: 2019-01-23
Payer: MEDICARE

## 2019-01-23 VITALS
HEART RATE: 73 BPM | SYSTOLIC BLOOD PRESSURE: 120 MMHG | OXYGEN SATURATION: 96 % | TEMPERATURE: 97.3 F | DIASTOLIC BLOOD PRESSURE: 82 MMHG

## 2019-01-23 PROCEDURE — 3331090001 HH PPS REVENUE CREDIT

## 2019-01-23 PROCEDURE — G0299 HHS/HOSPICE OF RN EA 15 MIN: HCPCS

## 2019-01-23 PROCEDURE — 3331090002 HH PPS REVENUE DEBIT

## 2019-01-24 ENCOUNTER — HOME CARE VISIT (OUTPATIENT)
Dept: SCHEDULING | Facility: HOME HEALTH | Age: 74
End: 2019-01-24
Payer: MEDICARE

## 2019-01-24 ENCOUNTER — HOME CARE VISIT (OUTPATIENT)
Dept: HOME HEALTH SERVICES | Facility: HOME HEALTH | Age: 74
End: 2019-01-24
Payer: MEDICARE

## 2019-01-24 VITALS
OXYGEN SATURATION: 94 % | SYSTOLIC BLOOD PRESSURE: 120 MMHG | RESPIRATION RATE: 16 BRPM | DIASTOLIC BLOOD PRESSURE: 78 MMHG | HEART RATE: 77 BPM | TEMPERATURE: 98.8 F

## 2019-01-24 VITALS
TEMPERATURE: 98.8 F | DIASTOLIC BLOOD PRESSURE: 58 MMHG | OXYGEN SATURATION: 98 % | HEART RATE: 88 BPM | SYSTOLIC BLOOD PRESSURE: 138 MMHG

## 2019-01-24 PROCEDURE — 3331090002 HH PPS REVENUE DEBIT

## 2019-01-24 PROCEDURE — 3331090001 HH PPS REVENUE CREDIT

## 2019-01-24 PROCEDURE — G0157 HHC PT ASSISTANT EA 15: HCPCS

## 2019-01-25 PROCEDURE — 3331090001 HH PPS REVENUE CREDIT

## 2019-01-25 PROCEDURE — 3331090002 HH PPS REVENUE DEBIT

## 2019-01-26 PROCEDURE — 3331090002 HH PPS REVENUE DEBIT

## 2019-01-26 PROCEDURE — 3331090001 HH PPS REVENUE CREDIT

## 2019-01-27 PROCEDURE — 3331090001 HH PPS REVENUE CREDIT

## 2019-01-27 PROCEDURE — 3331090002 HH PPS REVENUE DEBIT

## 2019-01-28 ENCOUNTER — HOME CARE VISIT (OUTPATIENT)
Dept: SCHEDULING | Facility: HOME HEALTH | Age: 74
End: 2019-01-28
Payer: MEDICARE

## 2019-01-28 VITALS
HEART RATE: 88 BPM | OXYGEN SATURATION: 98 % | RESPIRATION RATE: 16 BRPM | DIASTOLIC BLOOD PRESSURE: 88 MMHG | TEMPERATURE: 97.8 F | SYSTOLIC BLOOD PRESSURE: 140 MMHG

## 2019-01-28 PROCEDURE — 3331090002 HH PPS REVENUE DEBIT

## 2019-01-28 PROCEDURE — G0299 HHS/HOSPICE OF RN EA 15 MIN: HCPCS

## 2019-01-28 PROCEDURE — 3331090001 HH PPS REVENUE CREDIT

## 2019-01-29 ENCOUNTER — HOSPITAL ENCOUNTER (INPATIENT)
Age: 74
LOS: 6 days | Discharge: NURSING, REHAB OR LONG-TERM CARE | DRG: 901 | End: 2019-02-04
Attending: SURGERY | Admitting: SURGERY
Payer: MEDICARE

## 2019-01-29 DIAGNOSIS — Z89.619 S/P AKA (ABOVE KNEE AMPUTATION) UNILATERAL (HCC): Primary | ICD-10-CM

## 2019-01-29 PROBLEM — T81.31XA SURGICAL WOUND DEHISCENCE: Status: ACTIVE | Noted: 2019-01-29

## 2019-01-29 LAB
ANION GAP SERPL CALC-SCNC: 8 MMOL/L (ref 3–18)
BASOPHILS # BLD: 0 K/UL (ref 0–0.1)
BASOPHILS NFR BLD: 0 % (ref 0–2)
BUN SERPL-MCNC: 23 MG/DL (ref 7–18)
BUN/CREAT SERPL: 32 (ref 12–20)
CALCIUM SERPL-MCNC: 9.4 MG/DL (ref 8.5–10.1)
CHLORIDE SERPL-SCNC: 97 MMOL/L (ref 100–108)
CO2 SERPL-SCNC: 31 MMOL/L (ref 21–32)
CREAT SERPL-MCNC: 0.71 MG/DL (ref 0.6–1.3)
DIFFERENTIAL METHOD BLD: ABNORMAL
EOSINOPHIL # BLD: 0.1 K/UL (ref 0–0.4)
EOSINOPHIL NFR BLD: 1 % (ref 0–5)
ERYTHROCYTE [DISTWIDTH] IN BLOOD BY AUTOMATED COUNT: 13.7 % (ref 11.6–14.5)
GLUCOSE SERPL-MCNC: 140 MG/DL (ref 74–99)
HCT VFR BLD AUTO: 38.2 % (ref 36–48)
HGB BLD-MCNC: 12 G/DL (ref 13–16)
LYMPHOCYTES # BLD: 1.7 K/UL (ref 0.9–3.6)
LYMPHOCYTES NFR BLD: 14 % (ref 21–52)
MCH RBC QN AUTO: 28.6 PG (ref 24–34)
MCHC RBC AUTO-ENTMCNC: 31.4 G/DL (ref 31–37)
MCV RBC AUTO: 91.2 FL (ref 74–97)
MONOCYTES # BLD: 0.9 K/UL (ref 0.05–1.2)
MONOCYTES NFR BLD: 8 % (ref 3–10)
NEUTS SEG # BLD: 8.8 K/UL (ref 1.8–8)
NEUTS SEG NFR BLD: 77 % (ref 40–73)
PLATELET # BLD AUTO: 317 K/UL (ref 135–420)
PMV BLD AUTO: 10.4 FL (ref 9.2–11.8)
POTASSIUM SERPL-SCNC: 4.3 MMOL/L (ref 3.5–5.5)
RBC # BLD AUTO: 4.19 M/UL (ref 4.7–5.5)
SODIUM SERPL-SCNC: 136 MMOL/L (ref 136–145)
WBC # BLD AUTO: 11.5 K/UL (ref 4.6–13.2)

## 2019-01-29 PROCEDURE — 74011250636 HC RX REV CODE- 250/636: Performed by: SURGERY

## 2019-01-29 PROCEDURE — 74011250637 HC RX REV CODE- 250/637: Performed by: HOSPITALIST

## 2019-01-29 PROCEDURE — 3331090003 HH PPS REVENUE ADJ

## 2019-01-29 PROCEDURE — 3331090001 HH PPS REVENUE CREDIT

## 2019-01-29 PROCEDURE — 94640 AIRWAY INHALATION TREATMENT: CPT

## 2019-01-29 PROCEDURE — 80048 BASIC METABOLIC PNL TOTAL CA: CPT

## 2019-01-29 PROCEDURE — 87070 CULTURE OTHR SPECIMN AEROBIC: CPT

## 2019-01-29 PROCEDURE — 36415 COLL VENOUS BLD VENIPUNCTURE: CPT

## 2019-01-29 PROCEDURE — 65270000029 HC RM PRIVATE

## 2019-01-29 PROCEDURE — 74011000258 HC RX REV CODE- 258: Performed by: PHYSICIAN ASSISTANT

## 2019-01-29 PROCEDURE — 3331090002 HH PPS REVENUE DEBIT

## 2019-01-29 PROCEDURE — 87186 SC STD MICRODIL/AGAR DIL: CPT

## 2019-01-29 PROCEDURE — 74011250636 HC RX REV CODE- 250/636: Performed by: PHYSICIAN ASSISTANT

## 2019-01-29 PROCEDURE — 94760 N-INVAS EAR/PLS OXIMETRY 1: CPT

## 2019-01-29 PROCEDURE — 85025 COMPLETE CBC W/AUTO DIFF WBC: CPT

## 2019-01-29 PROCEDURE — 87077 CULTURE AEROBIC IDENTIFY: CPT

## 2019-01-29 PROCEDURE — 74011000250 HC RX REV CODE- 250: Performed by: HOSPITALIST

## 2019-01-29 RX ORDER — HYDROCODONE BITARTRATE AND ACETAMINOPHEN 5; 325 MG/1; MG/1
1 TABLET ORAL
Status: DISCONTINUED | OUTPATIENT
Start: 2019-01-29 | End: 2019-02-04 | Stop reason: HOSPADM

## 2019-01-29 RX ORDER — HYDROCHLOROTHIAZIDE 25 MG/1
25 TABLET ORAL DAILY
Status: DISCONTINUED | OUTPATIENT
Start: 2019-01-30 | End: 2019-02-04

## 2019-01-29 RX ORDER — TEMAZEPAM 15 MG/1
30 CAPSULE ORAL
Status: DISCONTINUED | OUTPATIENT
Start: 2019-01-29 | End: 2019-02-04 | Stop reason: HOSPADM

## 2019-01-29 RX ORDER — OXYCODONE AND ACETAMINOPHEN 5; 325 MG/1; MG/1
1 TABLET ORAL
Status: DISCONTINUED | OUTPATIENT
Start: 2019-01-29 | End: 2019-02-04 | Stop reason: HOSPADM

## 2019-01-29 RX ORDER — ARFORMOTEROL TARTRATE 15 UG/2ML
15 SOLUTION RESPIRATORY (INHALATION)
Status: DISCONTINUED | OUTPATIENT
Start: 2019-01-29 | End: 2019-02-04 | Stop reason: HOSPADM

## 2019-01-29 RX ORDER — DIAZEPAM 5 MG/1
5 TABLET ORAL
Status: DISCONTINUED | OUTPATIENT
Start: 2019-01-29 | End: 2019-01-29

## 2019-01-29 RX ORDER — BUDESONIDE AND FORMOTEROL FUMARATE DIHYDRATE 160; 4.5 UG/1; UG/1
2 AEROSOL RESPIRATORY (INHALATION) 2 TIMES DAILY
Status: DISCONTINUED | OUTPATIENT
Start: 2019-01-29 | End: 2019-01-29 | Stop reason: CLARIF

## 2019-01-29 RX ORDER — LOSARTAN POTASSIUM 50 MG/1
100 TABLET ORAL DAILY
Status: DISCONTINUED | OUTPATIENT
Start: 2019-01-30 | End: 2019-02-04 | Stop reason: HOSPADM

## 2019-01-29 RX ORDER — TAMSULOSIN HYDROCHLORIDE 0.4 MG/1
0.4 CAPSULE ORAL DAILY
Status: DISCONTINUED | OUTPATIENT
Start: 2019-01-30 | End: 2019-01-29

## 2019-01-29 RX ORDER — GUAIFENESIN 100 MG/5ML
81 LIQUID (ML) ORAL DAILY
Status: DISCONTINUED | OUTPATIENT
Start: 2019-01-30 | End: 2019-02-04 | Stop reason: HOSPADM

## 2019-01-29 RX ORDER — ACETAMINOPHEN 325 MG/1
650 TABLET ORAL
Status: DISCONTINUED | OUTPATIENT
Start: 2019-01-29 | End: 2019-02-04 | Stop reason: HOSPADM

## 2019-01-29 RX ORDER — BUDESONIDE 0.5 MG/2ML
500 INHALANT ORAL
Status: DISCONTINUED | OUTPATIENT
Start: 2019-01-29 | End: 2019-02-04 | Stop reason: HOSPADM

## 2019-01-29 RX ORDER — VANCOMYCIN/0.9 % SOD CHLORIDE 1.5G/250ML
1500 PLASTIC BAG, INJECTION (ML) INTRAVENOUS ONCE
Status: COMPLETED | OUTPATIENT
Start: 2019-01-29 | End: 2019-01-29

## 2019-01-29 RX ADMIN — BUDESONIDE 500 MCG: 0.5 INHALANT RESPIRATORY (INHALATION) at 19:28

## 2019-01-29 RX ADMIN — TEMAZEPAM 30 MG: 15 CAPSULE ORAL at 22:30

## 2019-01-29 RX ADMIN — PIPERACILLIN SODIUM,TAZOBACTAM SODIUM 3.38 G: 3; .375 INJECTION, POWDER, FOR SOLUTION INTRAVENOUS at 16:09

## 2019-01-29 RX ADMIN — OXYCODONE AND ACETAMINOPHEN 1 TABLET: 5; 325 TABLET ORAL at 21:57

## 2019-01-29 RX ADMIN — VANCOMYCIN HYDROCHLORIDE 1500 MG: 10 INJECTION, POWDER, LYOPHILIZED, FOR SOLUTION INTRAVENOUS at 16:03

## 2019-01-29 RX ADMIN — PIPERACILLIN SODIUM,TAZOBACTAM SODIUM 3.38 G: 3; .375 INJECTION, POWDER, FOR SOLUTION INTRAVENOUS at 22:01

## 2019-01-29 RX ADMIN — ARFORMOTEROL TARTRATE 15 MCG: 15 SOLUTION RESPIRATORY (INHALATION) at 19:28

## 2019-01-29 NOTE — PROGRESS NOTES
Patient is a Direct admit from PCP's office, accompanied by Angela Reddy (wife) to room 2107. A&Ox4. In patient's chart d/t assisting with obtaining information for Admission History Database. 1454- Patient requiring peripheral IV. See LDA.

## 2019-01-29 NOTE — ROUTINE PROCESS
Assume care of patient as a direct admission to room 2104. Patient received in a wheelchair awake. Patient A&Ox4, denies pain and discomfort. No distress noted. Frequently use items within reach. Bed locked in low position. Call bell within reach and patient verbalized understanding of use for assistance and needs.

## 2019-01-29 NOTE — PROGRESS NOTES
Physical Exam  
Eyes: Pupils are equal, round, and reactive to light. Skin: Skin is warm and dry. Scattered bruising present on bilateral arms. No pressure injury noted. Wound present on left lower leg with gauze dressing, ABD pad, and tape present on arrival. Erythema and +3 edema is present on right leg. Psychiatric: He has a normal mood and affect.  His behavior is normal. Judgment normal.

## 2019-01-29 NOTE — PROGRESS NOTES
Pharmacy Dosing Services: Vancomycin Indication: skin and soft tissue infection/wound dehiscence Day of therapy: 1 Other Antimicrobials (Include dose, start day & day of therapy): 
Zosyn 3.375 g iv EI q8h (day 1: ) Loading dose (date given): 1.5 g 
Current Maintenance dose: new start Goal Vancomycin Level: 15-20 
(Trough 15-20 for most infections, 20 for meningitis/osteomyelitis, pre-HD level ~25) Vancomycin Level (if drawn): pending Significant Cultures: pending Renal function stable? (unstable defined as SCr increase of 0.5 mg/dL or > 50% increase from baseline, whichever is greater) (Y/N): Y  
 
CAPD, Hemodialysis or Renal Replacement Therapy (Y/N): N Recent Labs  
  19 
1519 CREA 0.71  
BUN 23* WBC 11.5 Temp (24hrs), Av °F (36.7 °C), Min:98 °F (36.7 °C), Max:98 °F (36.7 °C) Creatinine Clearance (Creatinine Clearance (ml/min)): 72.7 ml/min Regimen assessment: new start Maintenance dose: 750 mg iv q12h Next scheduled level: Vanc Trough 430 Pharmacy will follow daily and adjust medications as appropriate for renal function and/or serum levels.  
 
Thank you, 
Sophronia Dubin PhD

## 2019-01-29 NOTE — CONSULTS
Consult Note    Patient: Dimas Miles               Sex: male          DOA: 1/29/2019         YOB: 1945      Age:  74 y.o.        LOS:  LOS: 0 days              HPI:     Dimas Miles is a 74 y.o. male who has been seen for medical management. He states he was admitted for amputation. He was directly admitted from Dr. Denise Syed office for planned surgery tomorrow due to surgical wound dehisence (right leg wound washout with possible wound vac placement). He was recently admitted 12/26-1/4 where he underwent atherectomy and stent placement of R pop artery occlusion. He was started on IV vancomycin and zosyn. He currently denies pain. Denies CP or SOB. Past Medical History:   Diagnosis Date    High cholesterol     Hypertension     PAD (peripheral artery disease) (HCC)        Past Surgical History:   Procedure Laterality Date    HX APPENDECTOMY      HX MOHS PROCEDURES Left     HX TONSIL AND ADENOIDECTOMY         No family history on file. Social History     Socioeconomic History    Marital status:      Spouse name: Not on file    Number of children: Not on file    Years of education: Not on file    Highest education level: Not on file   Tobacco Use    Smoking status: Current Every Day Smoker     Packs/day: 1.00   Substance and Sexual Activity    Alcohol use: Yes     Comment: \" beer 3/day\"       Prior to Admission medications    Medication Sig Start Date End Date Taking? Authorizing Provider   aspirin 81 mg chewable tablet Take 1 Tab by mouth daily. 1/5/19   Pat Shelton MD   oxyCODONE-acetaminophen (PERCOCET) 5-325 mg per tablet Take 1 Tab by mouth every four (4) hours as needed. Max Daily Amount: 6 Tabs. Patient taking differently: Take 1 Tab by mouth every four (4) hours as needed for Pain. 1/4/19   Dieudonne Tapia MD   tamsulosin (FLOMAX) 0.4 mg capsule Take 1 Cap by mouth daily.  1/5/19   Dieudonne Tapia MD   diazePAM (VALIUM) 5 mg tablet Take 5 mg by mouth every eight (8) hours as needed for Anxiety. Provider, Historical   budesonide-formoterol (SYMBICORT) 160-4.5 mcg/actuation HFAA Take 2 Puffs by inhalation two (2) times a day. Provider, Historical   losartan-hydrochlorothiazide (HYZAAR) 100-25 mg per tablet Take 0.5 Tabs by mouth daily. Alta Kern MD   rosuvastatin (CRESTOR) 40 mg tablet Take 40 mg by mouth nightly. Alta Kern MD   temazepam (RESTORIL) 30 mg capsule Take 30 mg by mouth nightly as needed for Sleep. Alta Kern MD       Allergies   Allergen Reactions    Augmentin [Amoxicillin-Pot Clavulanate] Diarrhea       Review of Systems  Review of Systems   Constitutional: Negative. HENT: Negative. Eyes: Negative. Respiratory: Negative. Cardiovascular: Negative. Gastrointestinal: Negative. Genitourinary: Negative. Musculoskeletal: right and left leg wounds  Skin: Negative. Neurological: Negative.     Psychiatric/Behavioral: Negative.         Physical Exam:      Visit Vitals  /65 (BP 1 Location: Right arm, BP Patient Position: Sitting)   Pulse 80   Temp 98 °F (36.7 °C)   Resp 16   Ht 5' 7\" (1.702 m)   Wt 57.2 kg (126 lb)   SpO2 92%   BMI 19.73 kg/m²       Physical Exam:  Gen: A&Ox3, chronically ill appearing, cachectic  Lungs: CTA-B  CV: RRR  Abd: soft, NT, NABS  LE: b/l legs wrapped in dressing, right foot edematous, erythematous  Neuro: No focal deficits  Psych: appropriate affect    Labs Reviewed:  BMP:   Lab Results   Component Value Date/Time     01/29/2019 03:19 PM    K 4.3 01/29/2019 03:19 PM    CL 97 (L) 01/29/2019 03:19 PM    CO2 31 01/29/2019 03:19 PM    AGAP 8 01/29/2019 03:19 PM     (H) 01/29/2019 03:19 PM    BUN 23 (H) 01/29/2019 03:19 PM    CREA 0.71 01/29/2019 03:19 PM    GFRAA >60 01/29/2019 03:19 PM    GFRNA >60 01/29/2019 03:19 PM     CBC:   Lab Results   Component Value Date/Time    WBC 11.5 01/29/2019 03:19 PM    HGB 12.0 (L) 01/29/2019 03:19 PM    HCT 38.2 01/29/2019 03:19 PM     01/29/2019 03:19 PM       Assessment/Plan     Principal Problem:    Surgical wound dehiscence (1/29/2019)    Active Problems:    High cholesterol (12/26/2018)      Hypertension (12/26/2018)      [facility] (12/26/2018)      COPD (chronic obstructive pulmonary disease) ([facility] (12/26/2018)      Insomnia (12/26/2018)      PLAN:  - IV antibiotics  - Per Vascular Surgery to OR tomorrow  - Pain control  - BP control  - Cont home inhalers  - Cont ASA  - Cont acceptable home medications for chronic conditions     I reviewed all available labs and imaging that were available prior to my encounter. We appreciate the consultation for medical management and appreciate being able to be involved with their care during hospitalization.       Geovanny Najera MD  1/29/2019  5:16 PM

## 2019-01-30 ENCOUNTER — ANESTHESIA EVENT (OUTPATIENT)
Dept: SURGERY | Age: 74
DRG: 901 | End: 2019-01-30
Payer: MEDICARE

## 2019-01-30 ENCOUNTER — ANESTHESIA (OUTPATIENT)
Dept: SURGERY | Age: 74
DRG: 901 | End: 2019-01-30
Payer: MEDICARE

## 2019-01-30 ENCOUNTER — HOME CARE VISIT (OUTPATIENT)
Dept: HOME HEALTH SERVICES | Facility: HOME HEALTH | Age: 74
End: 2019-01-30
Payer: MEDICARE

## 2019-01-30 LAB
ANION GAP SERPL CALC-SCNC: 7 MMOL/L (ref 3–18)
BUN SERPL-MCNC: 26 MG/DL (ref 7–18)
BUN/CREAT SERPL: 34 (ref 12–20)
CALCIUM SERPL-MCNC: 9.2 MG/DL (ref 8.5–10.1)
CHLORIDE SERPL-SCNC: 101 MMOL/L (ref 100–108)
CO2 SERPL-SCNC: 30 MMOL/L (ref 21–32)
CREAT SERPL-MCNC: 0.77 MG/DL (ref 0.6–1.3)
GLUCOSE BLD STRIP.AUTO-MCNC: 86 MG/DL (ref 70–110)
GLUCOSE SERPL-MCNC: 103 MG/DL (ref 74–99)
POTASSIUM SERPL-SCNC: 4.4 MMOL/L (ref 3.5–5.5)
SODIUM SERPL-SCNC: 138 MMOL/L (ref 136–145)

## 2019-01-30 PROCEDURE — 87076 CULTURE ANAEROBE IDENT EACH: CPT

## 2019-01-30 PROCEDURE — 80048 BASIC METABOLIC PNL TOTAL CA: CPT

## 2019-01-30 PROCEDURE — 87186 SC STD MICRODIL/AGAR DIL: CPT

## 2019-01-30 PROCEDURE — 76210000006 HC OR PH I REC 0.5 TO 1 HR: Performed by: SURGERY

## 2019-01-30 PROCEDURE — 77030012510 HC MSK AIRWY LMA TELE -B: Performed by: ANESTHESIOLOGY

## 2019-01-30 PROCEDURE — 74011250636 HC RX REV CODE- 250/636: Performed by: NURSE ANESTHETIST, CERTIFIED REGISTERED

## 2019-01-30 PROCEDURE — 77030019952 HC CANSTR VAC ASST KCON -B: Performed by: SURGERY

## 2019-01-30 PROCEDURE — 87077 CULTURE AEROBIC IDENTIFY: CPT

## 2019-01-30 PROCEDURE — 74011250636 HC RX REV CODE- 250/636: Performed by: PHYSICIAN ASSISTANT

## 2019-01-30 PROCEDURE — 74011250637 HC RX REV CODE- 250/637: Performed by: HOSPITALIST

## 2019-01-30 PROCEDURE — 87070 CULTURE OTHR SPECIMN AEROBIC: CPT

## 2019-01-30 PROCEDURE — 65270000029 HC RM PRIVATE

## 2019-01-30 PROCEDURE — 74011000250 HC RX REV CODE- 250: Performed by: HOSPITALIST

## 2019-01-30 PROCEDURE — 77030013708 HC HNDPC SUC IRR PULS STRY –B: Performed by: SURGERY

## 2019-01-30 PROCEDURE — 74011000258 HC RX REV CODE- 258: Performed by: PHYSICIAN ASSISTANT

## 2019-01-30 PROCEDURE — 74011250636 HC RX REV CODE- 250/636

## 2019-01-30 PROCEDURE — 74011250637 HC RX REV CODE- 250/637: Performed by: SURGERY

## 2019-01-30 PROCEDURE — 87185 SC STD ENZYME DETCJ PER NZM: CPT

## 2019-01-30 PROCEDURE — 77030018717 HC DRSG GRNUFM KCON -B: Performed by: SURGERY

## 2019-01-30 PROCEDURE — 87075 CULTR BACTERIA EXCEPT BLOOD: CPT

## 2019-01-30 PROCEDURE — 76060000032 HC ANESTHESIA 0.5 TO 1 HR: Performed by: SURGERY

## 2019-01-30 PROCEDURE — 36415 COLL VENOUS BLD VENIPUNCTURE: CPT

## 2019-01-30 PROCEDURE — 82962 GLUCOSE BLOOD TEST: CPT

## 2019-01-30 PROCEDURE — 3331090001 HH PPS REVENUE CREDIT

## 2019-01-30 PROCEDURE — 3331090002 HH PPS REVENUE DEBIT

## 2019-01-30 PROCEDURE — 74011000250 HC RX REV CODE- 250: Performed by: SURGERY

## 2019-01-30 PROCEDURE — 76010000138 HC OR TIME 0.5 TO 1 HR: Performed by: SURGERY

## 2019-01-30 PROCEDURE — 94640 AIRWAY INHALATION TREATMENT: CPT

## 2019-01-30 PROCEDURE — 74011000272 HC RX REV CODE- 272: Performed by: SURGERY

## 2019-01-30 PROCEDURE — 74011250636 HC RX REV CODE- 250/636: Performed by: SURGERY

## 2019-01-30 PROCEDURE — 94760 N-INVAS EAR/PLS OXIMETRY 1: CPT

## 2019-01-30 PROCEDURE — 0JBN0ZZ EXCISION OF RIGHT LOWER LEG SUBCUTANEOUS TISSUE AND FASCIA, OPEN APPROACH: ICD-10-PCS | Performed by: SURGERY

## 2019-01-30 RX ORDER — SODIUM CHLORIDE, SODIUM LACTATE, POTASSIUM CHLORIDE, CALCIUM CHLORIDE 600; 310; 30; 20 MG/100ML; MG/100ML; MG/100ML; MG/100ML
25 INJECTION, SOLUTION INTRAVENOUS CONTINUOUS
Status: DISCONTINUED | OUTPATIENT
Start: 2019-01-30 | End: 2019-01-30

## 2019-01-30 RX ORDER — FAMOTIDINE 20 MG/1
20 TABLET, FILM COATED ORAL ONCE
Status: DISCONTINUED | OUTPATIENT
Start: 2019-01-30 | End: 2019-01-30

## 2019-01-30 RX ORDER — LABETALOL HCL 20 MG/4 ML
5 SYRINGE (ML) INTRAVENOUS AS NEEDED
Status: DISCONTINUED | OUTPATIENT
Start: 2019-01-30 | End: 2019-02-04 | Stop reason: HOSPADM

## 2019-01-30 RX ORDER — FAMOTIDINE 20 MG/1
20 TABLET, FILM COATED ORAL ONCE
Status: CANCELLED | OUTPATIENT
Start: 2019-01-30 | End: 2019-01-30

## 2019-01-30 RX ORDER — FENTANYL CITRATE 50 UG/ML
25 INJECTION, SOLUTION INTRAMUSCULAR; INTRAVENOUS AS NEEDED
Status: DISCONTINUED | OUTPATIENT
Start: 2019-01-30 | End: 2019-01-30 | Stop reason: HOSPADM

## 2019-01-30 RX ORDER — ALBUTEROL SULFATE 0.83 MG/ML
2.5 SOLUTION RESPIRATORY (INHALATION) AS NEEDED
Status: DISCONTINUED | OUTPATIENT
Start: 2019-01-30 | End: 2019-01-30 | Stop reason: HOSPADM

## 2019-01-30 RX ORDER — SODIUM CHLORIDE, SODIUM LACTATE, POTASSIUM CHLORIDE, CALCIUM CHLORIDE 600; 310; 30; 20 MG/100ML; MG/100ML; MG/100ML; MG/100ML
100 INJECTION, SOLUTION INTRAVENOUS CONTINUOUS
Status: DISCONTINUED | OUTPATIENT
Start: 2019-01-30 | End: 2019-01-30 | Stop reason: HOSPADM

## 2019-01-30 RX ORDER — SODIUM CHLORIDE, SODIUM LACTATE, POTASSIUM CHLORIDE, CALCIUM CHLORIDE 600; 310; 30; 20 MG/100ML; MG/100ML; MG/100ML; MG/100ML
25 INJECTION, SOLUTION INTRAVENOUS CONTINUOUS
Status: DISCONTINUED | OUTPATIENT
Start: 2019-01-30 | End: 2019-01-30 | Stop reason: HOSPADM

## 2019-01-30 RX ORDER — HYDROMORPHONE HYDROCHLORIDE 2 MG/ML
0.5 INJECTION, SOLUTION INTRAMUSCULAR; INTRAVENOUS; SUBCUTANEOUS
Status: DISCONTINUED | OUTPATIENT
Start: 2019-01-30 | End: 2019-01-30 | Stop reason: HOSPADM

## 2019-01-30 RX ORDER — LIDOCAINE HYDROCHLORIDE 10 MG/ML
0.1 INJECTION, SOLUTION EPIDURAL; INFILTRATION; INTRACAUDAL; PERINEURAL AS NEEDED
Status: DISCONTINUED | OUTPATIENT
Start: 2019-01-30 | End: 2019-01-30 | Stop reason: HOSPADM

## 2019-01-30 RX ORDER — FENTANYL CITRATE 50 UG/ML
INJECTION, SOLUTION INTRAMUSCULAR; INTRAVENOUS AS NEEDED
Status: DISCONTINUED | OUTPATIENT
Start: 2019-01-30 | End: 2019-01-30 | Stop reason: HOSPADM

## 2019-01-30 RX ORDER — INSULIN LISPRO 100 [IU]/ML
INJECTION, SOLUTION INTRAVENOUS; SUBCUTANEOUS
Status: DISCONTINUED | OUTPATIENT
Start: 2019-01-30 | End: 2019-01-30 | Stop reason: HOSPADM

## 2019-01-30 RX ORDER — PROPOFOL 10 MG/ML
INJECTION, EMULSION INTRAVENOUS AS NEEDED
Status: DISCONTINUED | OUTPATIENT
Start: 2019-01-30 | End: 2019-01-30 | Stop reason: HOSPADM

## 2019-01-30 RX ORDER — FAMOTIDINE 20 MG/1
20 TABLET, FILM COATED ORAL
Status: COMPLETED | OUTPATIENT
Start: 2019-01-30 | End: 2019-01-30

## 2019-01-30 RX ORDER — INSULIN LISPRO 100 [IU]/ML
INJECTION, SOLUTION INTRAVENOUS; SUBCUTANEOUS ONCE
Status: DISCONTINUED | OUTPATIENT
Start: 2019-01-30 | End: 2019-01-30

## 2019-01-30 RX ORDER — LIDOCAINE HYDROCHLORIDE 20 MG/ML
INJECTION, SOLUTION EPIDURAL; INFILTRATION; INTRACAUDAL; PERINEURAL AS NEEDED
Status: DISCONTINUED | OUTPATIENT
Start: 2019-01-30 | End: 2019-01-30 | Stop reason: HOSPADM

## 2019-01-30 RX ADMIN — PIPERACILLIN SODIUM,TAZOBACTAM SODIUM 3.38 G: 3; .375 INJECTION, POWDER, FOR SOLUTION INTRAVENOUS at 05:48

## 2019-01-30 RX ADMIN — BUDESONIDE 500 MCG: 0.5 INHALANT RESPIRATORY (INHALATION) at 22:48

## 2019-01-30 RX ADMIN — TEMAZEPAM 30 MG: 15 CAPSULE ORAL at 22:55

## 2019-01-30 RX ADMIN — BUDESONIDE 500 MCG: 0.5 INHALANT RESPIRATORY (INHALATION) at 07:39

## 2019-01-30 RX ADMIN — SODIUM CHLORIDE 750 MG: 900 INJECTION, SOLUTION INTRAVENOUS at 20:12

## 2019-01-30 RX ADMIN — PIPERACILLIN SODIUM,TAZOBACTAM SODIUM 3.38 G: 3; .375 INJECTION, POWDER, FOR SOLUTION INTRAVENOUS at 20:13

## 2019-01-30 RX ADMIN — ARFORMOTEROL TARTRATE 15 MCG: 15 SOLUTION RESPIRATORY (INHALATION) at 07:39

## 2019-01-30 RX ADMIN — SODIUM CHLORIDE, SODIUM LACTATE, POTASSIUM CHLORIDE, AND CALCIUM CHLORIDE 100 ML/HR: 600; 310; 30; 20 INJECTION, SOLUTION INTRAVENOUS at 19:58

## 2019-01-30 RX ADMIN — ARFORMOTEROL TARTRATE 15 MCG: 15 SOLUTION RESPIRATORY (INHALATION) at 22:48

## 2019-01-30 RX ADMIN — OXYCODONE AND ACETAMINOPHEN 1 TABLET: 5; 325 TABLET ORAL at 20:15

## 2019-01-30 RX ADMIN — FAMOTIDINE 20 MG: 20 TABLET ORAL at 15:59

## 2019-01-30 RX ADMIN — LIDOCAINE HYDROCHLORIDE 60 MG: 20 INJECTION, SOLUTION EPIDURAL; INFILTRATION; INTRACAUDAL; PERINEURAL at 17:29

## 2019-01-30 RX ADMIN — OXYCODONE AND ACETAMINOPHEN 1 TABLET: 5; 325 TABLET ORAL at 10:56

## 2019-01-30 RX ADMIN — SODIUM CHLORIDE, SODIUM LACTATE, POTASSIUM CHLORIDE, AND CALCIUM CHLORIDE 25 ML/HR: 600; 310; 30; 20 INJECTION, SOLUTION INTRAVENOUS at 16:03

## 2019-01-30 RX ADMIN — SODIUM CHLORIDE 750 MG: 900 INJECTION, SOLUTION INTRAVENOUS at 05:50

## 2019-01-30 RX ADMIN — ACETAMINOPHEN 650 MG: 325 TABLET, FILM COATED ORAL at 02:34

## 2019-01-30 RX ADMIN — FENTANYL CITRATE 100 MCG: 50 INJECTION, SOLUTION INTRAMUSCULAR; INTRAVENOUS at 17:30

## 2019-01-30 RX ADMIN — PROPOFOL 150 MG: 10 INJECTION, EMULSION INTRAVENOUS at 17:29

## 2019-01-30 RX ADMIN — HYDROCODONE BITARTRATE AND ACETAMINOPHEN 1 TABLET: 5; 325 TABLET ORAL at 21:37

## 2019-01-30 RX ADMIN — MEPERIDINE HYDROCHLORIDE 13 MG: 100 INJECTION, SOLUTION INTRAMUSCULAR; INTRAVENOUS; SUBCUTANEOUS at 20:16

## 2019-01-30 NOTE — PROGRESS NOTES
conducted an initial consultation and Spiritual Assessment for Miguelito Adame, who is a 74 y. o.,male. Patients Primary Language is: [place]. According to the patients EMR Mandaen Affiliation is: [place]. The reason the Patient came to the hospital is:  
Patient Active Problem List  
 Diagnosis Date Noted  Surgical wound dehiscence 01/29/2019  Postprocedural hypotension 01/01/2019  Status post arterial stent 12/26/2018  High cholesterol 12/26/2018  Hypertension 12/26/2018  Peripheral arterial disease ([facility] 12/26/2018  COPD [facility] 12/26/2018  Insomnia 12/26/2018 The  provided the following Interventions: 
Initiated a relationship of care and support once again with patient in room 2107 as he is here for further issues with his feet and toes/  
Listened empathically as he talked about being here in the past and about what has brought him back to the hospital once again. Patient seemed a bit down as he shared his story. Family member was present at time of visit. Provided information about Spiritual Care Services. Offered prayer and assurance of continued prayers on patients behalf. The following outcomes were achieved: 
Patient shared limited information about his medical narrative. Patient processed feeling about current hospitalization. Patient expressed gratitude for pastoral care visit. Assessment: 
Patient does not have any Religion/cultural needs that will affect patients preferences in health care. There are no further spiritual or Religion issues which require Spiritual Care Services interventions at this time. Plan: 
Chaplains will continue to follow and will provide pastoral care on an as needed/requested basis Jonathan Breaux 3 Board Certified [address] [place] [address] Care  
[phone]

## 2019-01-30 NOTE — ROUTINE PROCESS
Bedside and Verbal shift change report given to Fabienne Councilman, RN (oncoming nurse) by Michelle Roberts RN (offgoing nurse). Report included the following information SBAR, Kardex, Intake/Output, MAR and Recent Results.

## 2019-01-30 NOTE — PROGRESS NOTES
Problem: Falls - Risk of 
Goal: *Absence of Falls Document Ole Dye Fall Risk and appropriate interventions in the flowsheet. Outcome: Progressing Towards Goal 
Fall Risk Interventions: 
Mobility Interventions: Bed/chair exit alarm, Communicate number of staff needed for ambulation/transfer, Patient to call before getting OOB, Utilize walker, cane, or other assistive device Medication Interventions: Bed/chair exit alarm Problem: Pressure Injury - Risk of 
Goal: *Prevention of pressure injury Document Gee Scale and appropriate interventions in the flowsheet. Outcome: Progressing Towards Goal 
Pressure Injury Interventions: 
Sensory Interventions: Assess changes in LOC, Keep linens dry and wrinkle-free, Minimize linen layers Activity Interventions: Increase time out of bed, Pressure redistribution bed/mattress(bed type), PT/OT evaluation Mobility Interventions: HOB 30 degrees or less, Pressure redistribution bed/mattress (bed type), PT/OT evaluation Nutrition Interventions: Document food/fluid/supplement intake

## 2019-01-30 NOTE — PROGRESS NOTES
Reason for Readmission:   Wound infection; surgical wound dehiscence RRAT Score and Risk Level:  18 mod Level of Readmission: 1 Care Conference scheduled:   Not at this time Resources/supports as identified by patient/family:  Pt lives with his wife, daughter Pete Barney is POA, she lives in [place] but is currently here while her father is in the hospital  
    
Top Challenges facing patient (as identified by patient/family and CM): Finances/Medication cost?   Has Medicare parts a & b, and [employer] for insurance. He is able to obtain meds at pharmacy but does state that his Symbicort is very expensive and has almost doubled in price Transportation    Daughter or cab Support system or lack thereof? As above Living arrangements? Lives with wife in 2 story home, he states that he stays on the first floor. Self-care/ADLs/Cognition? Alert and oriented x 4. Able to provide own self care and ADL's Current Advanced Directive/Advance Care Plan: On file Plan for utilizing home health:  CM to follow and assess. Has had home health nurse and pt/ot coming since discharge on 1/4 Likelihood of additional readmission:  [place] Transition of Care Plan:    Based on readmission, the patient's previous Plan of Care 
 has been evaluated and/or modified. The current Transition of Care Plan  is:  
Martinez Mann pt with daughter Shabbir Laura at bedside. Pt gave permission for her to be present during interview. Pete Barney Is pt's POA and normally lives in [place], but is here while her father is in the hospital. Verified all face sheet info. Pt states that at baseline he is independent with ADL's, however, since his last hospitalization has been using 4 wheel walker at Dr's recommendation. He has had home health nurse 3 x/wk and pt/ot 2 x/wk since discharge on 1/4/18.   States they were working with him on stair climbing. Discussed possibility of rehab after discharge. Daughter Fazal Fernandez stated Yomaira Simpson went home with home health last time and look where we are now, so maybe rehab would be a good idea\". She also mentioned that pt is to be discharged with a wound vac. Pt does not know at this time who might be providing transportation at discharge. States he may call a cab. Pt indicates that daughter Flor Padilla may hear any medical information or discharge instructions if present. Her number is [phone] Care Management Interventions PCP Verified by CM: Yes Last Visit to PCP: 01/16/19 Mode of Transport at Discharge: Self Transition of Care Consult (CM Consult): Discharge Planning Current Support Network: Lives with Spouse Confirm Follow Up Transport: Other (see comment)(pt states unsure, possibly cab) Discharge Location Discharge Placement: Other:(home with home health vs snf)

## 2019-01-30 NOTE — H&P
History and Physical 
 
 
H& P should be scanned from office note 1/29/19. There are no new changes to H&P.

## 2019-01-30 NOTE — PERIOP NOTES
1816 Pt received to PACU and connected to monitor. Bedside report given by Monika Hamilton RN. Vital signs stable. Nurse at bedside. Will continue to monitor. 1841 TRANSFER - OUT REPORT: 
 
Verbal report given to JANEEN Dean  on Bruno November  being transferred to Room 2701 (unit) for routine post - op Report consisted of patients Situation, Background, Assessment and  
Recommendations(SBAR). Information from the following report(s) SBAR, Kardex and MAR was reviewed with the receiving nurse. Lines:  
Peripheral IV 01/29/19 Left; Lower Arm (Active) Site Assessment Clean, dry, & intact 1/30/2019  6:16 PM  
Phlebitis Assessment 0 1/30/2019  4:00 PM  
Infiltration Assessment 0 1/30/2019  6:16 PM  
Dressing Status Clean, dry, & intact 1/30/2019  6:16 PM  
Dressing Type Transparent;Tape 1/30/2019  6:16 PM  
Hub Color/Line Status Pink; Infusing 1/30/2019  6:16 PM  
Action Taken Open ports on tubing capped 1/30/2019  6:16 PM  
Alcohol Cap Used Yes 1/30/2019  6:16 PM  
  
 
Opportunity for questions and clarification was provided. Patient transported with: 
 [employer]

## 2019-01-30 NOTE — PERIOP NOTES
Spoke to bedside RNBrayan . RN to complete pre-procedure checklist and surgery consent. CHG wipes completed. Pt NPO.

## 2019-01-30 NOTE — ROUTINE PROCESS
Assume care of patient from Nicole Hale RN. Patient received in bed awake and up at the bedside commode. Patient A&Ox4, denies pain and discomfort. No distress noted. Was informed that pt has been NPO since midnight. Frequently use items within reach. Bed locked in low position. Call bell within reach and patient verbalized understanding of use for assistance and needs.

## 2019-01-30 NOTE — PROGRESS NOTES
Problem: Falls - Risk of 
Goal: *Absence of Falls Document Mckenna Cardona Fall Risk and appropriate interventions in the flowsheet. Outcome: Progressing Towards Goal 
Fall Risk Interventions: 
Mobility Interventions: Bed/chair exit alarm, Communicate number of staff needed for ambulation/transfer, Patient to call before getting OOB, Utilize walker, cane, or other assistive device Medication Interventions: Bed/chair exit alarm Problem: Pressure Injury - Risk of 
Goal: *Prevention of pressure injury Document Gee Scale and appropriate interventions in the flowsheet. Outcome: Progressing Towards Goal 
Pressure Injury Interventions: 
Sensory Interventions: Assess changes in LOC, Keep linens dry and wrinkle-free, Minimize linen layers Activity Interventions: Increase time out of bed, Pressure redistribution bed/mattress(bed type), PT/OT evaluation Mobility Interventions: HOB 30 degrees or less, Pressure redistribution bed/mattress (bed type), PT/OT evaluation Nutrition Interventions: Document food/fluid/supplement intake

## 2019-01-30 NOTE — PROGRESS NOTES
Nutrition initial assessment/Plan of care RECOMMENDATIONS:  
1. When medically indicated advance to Dental Soft Solid Diet (chopped meats w/ gravy) per preference 2. Ensure Enlive TID (Chocolate) once diet advanced 3. Monitor labs, weight and PO intake 4. RD to follow GOALS:  
1. PO intake meets >75% of protein/calorie needs by 2/4 
2. Weight Maintenance/gradual gain (1-2 lb/week) by 2/6 
ASSESSMENT: 
Wt status is classified as normal per BMI of 19.7. However, Pt at nutrition risk d/t BMI <23 and age >65 years. Per documented weight records Pt was 139 lb on (3/15/2018) equating to a total of 13 lb or 9.3% loss x 11-12 months with 8 lb or 6% x past 3 weeks. Currently NPO for surgery. Labs noted. Nutrition recommendations listed. RD to follow. Nutrition Diagnoses:  
Unintentional weight loss related to inadequate energy absorption PTA as evidenced by a total of 13 lb or 9.3% loss x 11-12 months with 8 lb or 6% x past 3 weeks per documented weight records. Meets Criteria for Acute Malnutrition  
[x] Severe Malnutrition, as evidenced by: 
 [x] Moderate muscle wasting, loss of subcutaneous fat 
 [] Nutritional intake of <50% of recommended intake for >5 days [x] Weight loss of >1-2% in 1 week, >5% in 1 month, >7.5% in 3 months, or >10% in 6 months 
 [] Moderate-severe edema Nutrition Risk:  [] High  [x] Moderate []  Low SUBJECTIVE/OBJECTIVE: 
 Pt admitted for surgery due to surgical wound dehiscence. Per chart review (right leg wound washout with possible wound vac placement). He was recently admitted 12/26-1/4 where he underwent atherectomy and stent placement of R pop artery occlusion. Familiar with Pt from recent admission when some Hx was obtained. Variability in recent weights recorded.  Per documented weight records Pt was 139 lb on (3/15/2018) equating to a total of 13 lb or 9.3% loss x 11-12 months with 8 lb or 6% x past 3 weeks. Pt seen in room; appears thin with noted muscle wasting and SQ fat loss. Denies having any food allergies, prefers softer texture foods. Reports having a good appetite and consumes 3 meals per day at home? Confirms recent weight loss. Not currently consuming any nutritional supplements at home, but agreeable to try Ensure Enlive (chocolate) once diet is advance. Will monitor. Information Obtained from:  
 [x] Chart Review [x] Patient [x] Family/Caregiver 
 [] Nurse/Physician 
 [] Interdisciplinary Meeting/Rounds Diet: NPO Medications: [x] Reviewed Allergies: [x] Reviewed Patient Active Problem List  
Diagnosis Code  Status post arterial stent Z95.9  High cholesterol E78.00  Hypertension I10  
 Peripheral arterial disease ([facility]) I73.9  
 COPD (chronic obstructive pulmonary disease) ([facility]) J44.9  Insomnia G47.00  Postprocedural hypotension I95.81  
 Surgical wound dehiscence T81.31XA Past Medical History:  
Diagnosis Date  High cholesterol  Hypertension  PAD (peripheral artery disease) ([facility] Labs:   
Lab Results Component Value Date/Time Sodium 138 01/30/2019 04:15 AM  
 Potassium 4.4 01/30/2019 04:15 AM  
 Chloride 101 01/30/2019 04:15 AM  
 CO2 30 01/30/2019 04:15 AM  
 Anion gap 7 01/30/2019 04:15 AM  
 Glucose 103 (H) 01/30/2019 04:15 AM  
 BUN 26 (H) 01/30/2019 04:15 AM  
 Creatinine 0.77 01/30/2019 04:15 AM  
 Calcium 9.2 01/30/2019 04:15 AM  
 Magnesium 1.6 12/29/2018 01:15 AM  
 Albumin 3.6 12/26/2018 02:05 PM  
 
Anthropometrics: BMI (calculated): 19.7 Last 3 Recorded Weights in this Encounter 01/29/19 1601 Weight: 57.2 kg (126 lb) Ht Readings from Last 1 Encounters:  
01/29/19 5' 7\" (1.702 m) Weight Metrics 1/29/2019 1/6/2019 1/2/2019 5/19/2015 Weight 126 lb 134 lb 134 lb 14.7 oz 158 lb BMI 19.73 kg/m2 20.37 kg/m2 20.51 kg/m2 24.03 kg/m2 Per Care Everywhere: 
 
 
 
 
 
 
Patient Vitals for the past 100 hrs: % Diet Eaten 01/29/19 1800 80 % IBW: 148 lb %IBW: 85% [x] Weight Loss ? ? (Variability in weights recorded) 
[] Weight Gain 
[] Weight Stable per Pt and family Estimated Nutrition Needs: [x] MSJ  [] Other: 
Calories: 5480-2259 kcal Based on:   [x] Actual BW   
Protein:   69-80 g Based on:   [x] Actual BW Fluid:       1710-8876 ml Based on:   [x] Actual BW  
 
 [x] No Cultural, Jainism or ethnic dietary need identified. [] Cultural, Jainism and ethnic food preferences identified and addressed Wt Status:  [x] Normal (18.6 - 24.9) [] Underweight (<18.5) [] Overweight (25 - 29.9) [] Mild Obesity (30 - 34.9)  [] Moderate Obesity (35 - 39.9) [] Morbid Obesity (40+) Nutrition Problems Identified:  
[x] Suboptimal PO intake v/s NPO 
[] Food Allergies [x] Difficulty chewing/swallowing/poor dentition 
[] Constipation/Diarrhea  
[] Nausea/Vomiting  
[] None 
[x] Other: poor wound healing and weight loss Plan:  
[] Therapeutic Diet 
[]  Obtained/adjusted food preferences/tolerances and/or snacks options [x]  Supplements added once diet advanced 
[] Occupational therapy following for feeding techniques []  HS snack added  
[x]  Modify diet texture  
[]  Modify diet for food allergies []  Educate patient  
[]  Assist with menu selection  
[x]  Monitor PO intake on meal rounds  
[x]  Continue inpatient monitoring and intervention  
[]  Participated in discharge planning/Interdisciplinary rounds/Team meetings  
[]  Other:  
 
Education Needs: 
 [] Not appropriate for teaching at this time due to: 
 [x] Identified and addressed Nutrition Monitoring and Evaluation: 
[x] Continue ongoing monitoring and intervention 
[] Other Zuleyka Berry

## 2019-01-30 NOTE — BRIEF OP NOTE
BRIEF OPERATIVE NOTE Date of Procedure: 1/30/2019 Preoperative Diagnosis: t81.4 Postoperative Diagnosis: t81.4 Procedure(s): 
right leg wound washout with debridement and   wound vac placement Surgeon(s) and Role: * Sagar Mcelroy MD - Primary Surgical Staff: 
Circ-1: Breana Ortiz Scrub Tech-1: Suzanne Pitts Surg Asst-1: Melvin Tomlinchwood Event Time In Time Out Incision Start 01/30/2019 1745 Incision Close Anesthesia: General  
Estimated Blood Loss: Minimal <5mL Specimens:  
ID Type Source Tests Collected by Time Destination 1 : right leg wound Wound  CULTURE, ANAEROBIC, CULTURE, WOUND W GRAM STAIN Sagar Mcelroy MD 1/30/2019 1755 Microbiology Findings: Calf muscle responsive to bovie stimulation. No purulence. Complications: None Implants: None

## 2019-01-30 NOTE — ROUTINE PROCESS
Pre-procedure checklist completed. CHG wipes completed. Spoke with Ivan Goins RN [facility]) consent has not been confirmed with patient at this point, stated that consent can be completed when patient arrives before surgery which is scheduled at 36 today.

## 2019-01-30 NOTE — PROGRESS NOTES
Problem: Falls - Risk of 
Goal: *Absence of Falls Document Leilani Josealexandra Fall Risk and appropriate interventions in the flowsheet. Outcome: Progressing Towards Goal 
Fall Risk Interventions: 
Mobility Interventions: Communicate number of staff needed for ambulation/transfer, Patient to call before getting OOB, Utilize walker, cane, or other assistive device Medication Interventions: Patient to call before getting OOB, Teach patient to arise slowly Problem: Pressure Injury - Risk of 
Goal: *Prevention of pressure injury Document Gee Scale and appropriate interventions in the flowsheet. Outcome: Progressing Towards Goal 
Pressure Injury Interventions: 
Sensory Interventions: Assess changes in LOC, Keep linens dry and wrinkle-free, Minimize linen layers, Pressure redistribution bed/mattress (bed type) Activity Interventions: Pressure redistribution bed/mattress(bed type) Mobility Interventions: Pressure redistribution bed/mattress (bed type) Nutrition Interventions: Document food/fluid/supplement intake

## 2019-01-30 NOTE — ANESTHESIA PREPROCEDURE EVALUATION
Anesthetic History No history of anesthetic complications Review of Systems / Medical History Patient summary reviewed and pertinent labs reviewed Pulmonary Within defined limits COPD: moderate Smoker Neuro/Psych Within defined limits Cardiovascular Within defined limits Hypertension PAD Exercise tolerance: >4 METS 
  
GI/Hepatic/Renal 
Within defined limits Endo/Other Within defined limits Other Findings Physical Exam 
 
Airway Mallampati: II 
TM Distance: 4 - 6 cm Neck ROM: normal range of motion Mouth opening: Normal 
 
 Cardiovascular Regular rate and rhythm,  S1 and S2 normal,  no murmur, click, rub, or gallop Rhythm: regular Rate: normal 
 
 
 
 Dental 
 
Dentition: Edentulous, Full upper dentures and Full lower dentures Pulmonary Breath sounds clear to auscultation Abdominal 
GI exam deferred Other Findings Anesthetic Plan ASA: 3 Anesthesia type: general 
 
 
 
 
Induction: Intravenous Anesthetic plan and risks discussed with: Patient

## 2019-01-30 NOTE — PROGRESS NOTES
1940- Bedside report given by Valentina Avilez RN. Pt in bed resting quietly denies pain. Pt has wound to right foot dressings intact. Foot noted to be swollen, end of toes looks slightly purple in color. Pt able to wriggle toes and move the leg but c/o tingling sensation. Pt stated that he can not elevate the leg in anyway because pain gets worse. Pt aware of being NPO after midnight for procedure in am. Call light within reach. 2200- Pedal pulses by doppler- very faint 003 Pt note sitting at the edge of bed sleeping. Daughter at bedside on recliner sleeping 
 
0725- Bedside and Verbal shift change report given to  Valentina Avilez RN (oncoming nurse) by Jorge A Sorensen RN (offgoing nurse). Report included the following information SBAR, Kardex, Intake/Output, MAR and Recent Results. Araceli Tong

## 2019-01-30 NOTE — PROGRESS NOTES
Internal Medicine Progress Note Patient's Name: Dale Byrne Admit Date: 1/29/2019 Length of Stay: 1 Assessment/Plan Principal Problem: 
  Surgical wound dehiscence (1/29/2019) Active Problems: Hypertension (12/26/2018) Peripheral arterial disease ([facility] [address] (12/26/2018) COPD (chronic obstructive pulmonary disease) ([facility] [address] [facility] (12/26/2018) Insomnia (12/26/2018) Surgical would dehiscence s/p arthrectomy - IV antibiotics - vanc, zosyn - Per Vascular Surgery to OR 1/30 
- Pain control HTN 
- BP control - cont home meds COPD 
- Cont nebulizers Insomnia - temazepam qhs PRN 
 
 
- Cont acceptable home medications for chronic conditions  
- DVT protocol 
- discharge: TBD I have personally reviewed all pertinent labs and films that have officially resulted over the last 24 hours. I have personally checked for all pending labs that are awaiting final results. Interval History Dale Byrne is a 74 y.o. male who has been seen for medical management. He states he was admitted for amputation. He was directly admitted from Dr. Tiki Antoine office for planned surgery tomorrow due to surgical wound dehisence (right leg wound washout with possible wound vac placement). He was recently admitted 12/26-1/4 where he underwent atherectomy and stent placement of R pop artery occlusion. He was started on IV vancomycin and zosyn. To OR 1/30 per VS. Scheduled nebs for COPD. Subjective Pt s/e @ bedside. No major events overnight. Pt reports RLE when elevated. Denies CP or SOB. Denies abd pain, nvd. Objective Visit Vitals /70 (BP 1 Location: Right arm, BP Patient Position: At rest) Pulse 72 Temp 98.1 °F (36.7 °C) Resp 16 Ht 5' 7\" (1.702 m) Wt 57.2 kg (126 lb) SpO2 94% BMI 19.73 kg/m² Physical Exam: 
General Appearance: NAD, conversant HENT: normocephalic/atraumatic, moist mucus membranes Neck: No JVD, supple Lungs: CTA with normal respiratory effort CV: RRR, no m/r/g Abdomen: soft, non-tender, normal bowel sounds Extremities: no cyanosis, no peripheral edema, RLE with gauze dressing Neuro: No focal deficits, motor/sensory intact Skin: Normal color, intact Intake and Output: 
Current Shift:  01/30 0701 - 01/30 1900 In: 250 [I.V.:250] Out: - Last three shifts:  01/28 1901 - 01/30 0700 In: 500 [P.O.:200; I.V.:300] Out: 0 Lab/Data Reviewed: 
BMP:  
Lab Results Component Value Date/Time  01/30/2019 04:15 AM  
 K 4.4 01/30/2019 04:15 AM  
  01/30/2019 04:15 AM  
 CO2 30 01/30/2019 04:15 AM  
 AGAP 7 01/30/2019 04:15 AM  
  (H) 01/30/2019 04:15 AM  
 BUN 26 (H) 01/30/2019 04:15 AM  
 CREA 0.77 01/30/2019 04:15 AM  
 GFRAA >60 01/30/2019 04:15 AM  
 GFRNA >60 01/30/2019 04:15 AM  
 
CBC:  
Lab Results Component Value Date/Time WBC 11.5 01/29/2019 03:19 PM  
 HGB 12.0 (L) 01/29/2019 03:19 PM  
 HCT 38.2 01/29/2019 03:19 PM  
  01/29/2019 03:19 PM  
 
 
Imaging Reviewed: 
No results found. Medications Reviewed: 
Current Facility-Administered Medications Medication Dose Route Frequency  lidocaine (PF) (XYLOCAINE) 10 mg/mL (1 %) injection 0.1 mL  0.1 mL SubCUTAneous PRN  
 lactated Ringers infusion  25 mL/hr IntraVENous CONTINUOUS  
 famotidine (PEPCID) tablet 20 mg  20 mg Oral ON CALL TO OR  
 insulin lispro (HUMALOG) injection   SubCUTAneous ON CALL TO OR  
 piperacillin-tazobactam (ZOSYN) 3.375 g in 0.9% sodium chloride (MBP/ADV) 100 mL MBP \"EXTENDED 4 HOUR INFUSION###\"   3.375 g IntraVENous Q8H  
 [START ON 1/31/2019] VANCOMYCIN INFORMATION NOTE   Other ONCE  
 VANCOMYCIN INFORMATION NOTE 1 Each  1 Each Other Rx Dosing/Monitoring  vancomycin (VANCOCIN) 750 mg in 0.9% sodium chloride (MBP/ADV) 250 mL ADV  750 mg IntraVENous Q12H  aspirin chewable tablet 81 mg  81 mg Oral DAILY  temazepam (RESTORIL) capsule 30 mg  30 mg Oral QHS PRN  
  arformoterol (BROVANA) neb solution 15 mcg  15 mcg Nebulization BID RT And  
 budesonide (PULMICORT) 500 mcg/2 ml nebulizer suspension  500 mcg Nebulization BID RT  
 acetaminophen (TYLENOL) tablet 650 mg  650 mg Oral Q4H PRN  
 HYDROcodone-acetaminophen (NORCO) 5-325 mg per tablet 1 Tab  1 Tab Oral Q6H PRN  
 oxyCODONE-acetaminophen (PERCOCET) 5-325 mg per tablet 1 Tab  1 Tab Oral Q6H PRN  
 losartan (COZAAR) tablet 100 mg  100 mg Oral DAILY  hydroCHLOROthiazide (HYDRODIURIL) tablet 25 mg  25 mg Oral DAILY Ed Johnson PA-C [address] [place], [place] [address] Group Hospitalist Division Office:  [phone] Pager: [phone]

## 2019-01-30 NOTE — PROGRESS NOTES
Problem: Discharge Planning Goal: *Discharge to safe environment Outcome: Progressing Towards Goal 
Home health vs snf

## 2019-01-31 LAB
ANION GAP SERPL CALC-SCNC: 6 MMOL/L (ref 3–18)
BUN SERPL-MCNC: 23 MG/DL (ref 7–18)
BUN/CREAT SERPL: 35 (ref 12–20)
CALCIUM SERPL-MCNC: 8.9 MG/DL (ref 8.5–10.1)
CHLORIDE SERPL-SCNC: 103 MMOL/L (ref 100–108)
CO2 SERPL-SCNC: 30 MMOL/L (ref 21–32)
CREAT SERPL-MCNC: 0.66 MG/DL (ref 0.6–1.3)
DATE LAST DOSE: NORMAL
ERYTHROCYTE [DISTWIDTH] IN BLOOD BY AUTOMATED COUNT: 13.6 % (ref 11.6–14.5)
GLUCOSE SERPL-MCNC: 105 MG/DL (ref 74–99)
HCT VFR BLD AUTO: 34.8 % (ref 36–48)
HGB BLD-MCNC: 10.9 G/DL (ref 13–16)
MCH RBC QN AUTO: 28.5 PG (ref 24–34)
MCHC RBC AUTO-ENTMCNC: 31.3 G/DL (ref 31–37)
MCV RBC AUTO: 91.1 FL (ref 74–97)
PLATELET # BLD AUTO: 324 K/UL (ref 135–420)
PMV BLD AUTO: 10.8 FL (ref 9.2–11.8)
POTASSIUM SERPL-SCNC: 4.7 MMOL/L (ref 3.5–5.5)
RBC # BLD AUTO: 3.82 M/UL (ref 4.7–5.5)
REPORTED DOSE,DOSE: NORMAL UNITS
REPORTED DOSE/TIME,TMG: 2000
SODIUM SERPL-SCNC: 139 MMOL/L (ref 136–145)
VANCOMYCIN TROUGH SERPL-MCNC: 12 UG/ML (ref 10–20)
WBC # BLD AUTO: 12 K/UL (ref 4.6–13.2)

## 2019-01-31 PROCEDURE — 74011000250 HC RX REV CODE- 250: Performed by: HOSPITALIST

## 2019-01-31 PROCEDURE — 3331090001 HH PPS REVENUE CREDIT

## 2019-01-31 PROCEDURE — 74011250637 HC RX REV CODE- 250/637: Performed by: HOSPITALIST

## 2019-01-31 PROCEDURE — 80048 BASIC METABOLIC PNL TOTAL CA: CPT

## 2019-01-31 PROCEDURE — 74011250637 HC RX REV CODE- 250/637: Performed by: SURGERY

## 2019-01-31 PROCEDURE — 74011250636 HC RX REV CODE- 250/636: Performed by: PHYSICIAN ASSISTANT

## 2019-01-31 PROCEDURE — 94640 AIRWAY INHALATION TREATMENT: CPT

## 2019-01-31 PROCEDURE — 94760 N-INVAS EAR/PLS OXIMETRY 1: CPT

## 2019-01-31 PROCEDURE — 65270000029 HC RM PRIVATE

## 2019-01-31 PROCEDURE — 85027 COMPLETE CBC AUTOMATED: CPT

## 2019-01-31 PROCEDURE — 36415 COLL VENOUS BLD VENIPUNCTURE: CPT

## 2019-01-31 PROCEDURE — 74011000258 HC RX REV CODE- 258: Performed by: PHYSICIAN ASSISTANT

## 2019-01-31 PROCEDURE — 80202 ASSAY OF VANCOMYCIN: CPT

## 2019-01-31 PROCEDURE — 3331090002 HH PPS REVENUE DEBIT

## 2019-01-31 RX ADMIN — PIPERACILLIN SODIUM,TAZOBACTAM SODIUM 3.38 G: 3; .375 INJECTION, POWDER, FOR SOLUTION INTRAVENOUS at 20:36

## 2019-01-31 RX ADMIN — LOSARTAN POTASSIUM 100 MG: 50 TABLET ORAL at 09:34

## 2019-01-31 RX ADMIN — PIPERACILLIN SODIUM,TAZOBACTAM SODIUM 3.38 G: 3; .375 INJECTION, POWDER, FOR SOLUTION INTRAVENOUS at 04:17

## 2019-01-31 RX ADMIN — SODIUM CHLORIDE 750 MG: 900 INJECTION, SOLUTION INTRAVENOUS at 09:42

## 2019-01-31 RX ADMIN — HYDROCODONE BITARTRATE AND ACETAMINOPHEN 1 TABLET: 5; 325 TABLET ORAL at 11:36

## 2019-01-31 RX ADMIN — BUDESONIDE 500 MCG: 0.5 INHALANT RESPIRATORY (INHALATION) at 19:52

## 2019-01-31 RX ADMIN — OXYCODONE AND ACETAMINOPHEN 1 TABLET: 5; 325 TABLET ORAL at 21:29

## 2019-01-31 RX ADMIN — SODIUM CHLORIDE 750 MG: 900 INJECTION, SOLUTION INTRAVENOUS at 20:39

## 2019-01-31 RX ADMIN — ARFORMOTEROL TARTRATE 15 MCG: 15 SOLUTION RESPIRATORY (INHALATION) at 19:53

## 2019-01-31 RX ADMIN — ASPIRIN 81 MG 81 MG: 81 TABLET ORAL at 09:34

## 2019-01-31 RX ADMIN — HYDROCHLOROTHIAZIDE 25 MG: 25 TABLET ORAL at 09:35

## 2019-01-31 RX ADMIN — PIPERACILLIN SODIUM,TAZOBACTAM SODIUM 3.38 G: 3; .375 INJECTION, POWDER, FOR SOLUTION INTRAVENOUS at 11:39

## 2019-01-31 RX ADMIN — OXYCODONE AND ACETAMINOPHEN 1 TABLET: 5; 325 TABLET ORAL at 04:56

## 2019-01-31 RX ADMIN — TEMAZEPAM 30 MG: 15 CAPSULE ORAL at 21:29

## 2019-01-31 NOTE — PROGRESS NOTES
Patient received IV Zosyn which started at 1417. Patient left floor for surgery and IV was stopped at 1605. Patient came back to floor and IV was restarted at 2014. Vancomycin was scheduled for 5pm was given at 2012. Spoke with pharmacy for medication time adjustment.

## 2019-01-31 NOTE — ROUTINE PROCESS
Bedside and Verbal shift change  Received from Noam Nur RN (outgoing nurse), to PERI Alvarado (oncoming)  Pt. Is AOX 4. IV SL, Pt. denies  pain at this time. Report included the following information SBAR, Kardex, Procedure Summary, Intake/Output, MAR, Recent Lab Results,  . Will resume care and monitor Pt. Condition. NSR Pt. Educated on call bell when in need of help and assistance. Pt. verbalized understanding. 2129 Pt. Head to toe Assessment Done and documented. 2137  Pt. Given norco per [facility] For pain management. 2215  Pt. Resting comfortably in bed. 0000  Pt. Made no complaints. Pt. Educated on Bed rest, Pt. Continues to OOB at bedside to void. 0200  Pt. OOB at bedside to void. Pt. Educated on Bedrest, pt. Made no complaints. 0330  Pt. Awake, denies pain. 0456  Pt. Given percocet per [facility] for pain management. 0515  Pt. Accidentally pulled wound vac, reinforced and is now patent and suctioning well. 0630  Pt denies discomfort. Verbal and bedside Shift changed report given to Meet Ramos RN (oncoming RN) on Pt. Condition. Report consisted of patients Situation, History, Activities, intake/output,  Background, Assessment and Recommendations(SBAR). Information from the following report(s) Kardex, order Summary, Lab results and MAR was reviewed with the receiving nurse. Opportunity for questions and clarification was provided.

## 2019-01-31 NOTE — PROGRESS NOTES
[facility] 
[address] [place], [address] Rosangela Hameed, Dr. Navin Bravo, Dr. Pam Rayo, & Dr. Danika Lima [phone] FAX# [phone] Progress Note Patient: Sohan Reed MRN: [id number]  SSN: [id number] YOB: 1945  Age: 74 y.o. Sex: male Admit Date: 1/29/2019 LOS: 2 days Subjective: POD#1 RLE w/o and wound vac placement. C/o RLE rest pain and RLE toe numbness. Denies LLE rest pain, n/v/d, rigors, diaphoresis, SOB, chest pain. Currently on ASA. Currently on Vancomycin and Zosyn. Objective:  
 
Vitals:  
 01/30/19 2248 01/31/19 3569 01/31/19 2136 01/31/19 3145 BP:  169/75 166/67 126/66 Pulse:  80 71 80 Resp:  16 16 16 Temp:  98.1 °F (36.7 °C) 98 °F (36.7 °C) 98 °F (36.7 °C) SpO2: 92% 94% 94% 90% Weight:      
Height:      
  
 
Intake and Output: 
Current Shift: 01/31 0701 - 01/31 1900 In: 240 [P.O.:240] Out: 350 [Urine:350] Last three shifts: 01/29 1901 - 01/31 0700 In: 2075 [P.O.:240; I.V.:1150] Out: 475 [Urine:470] Physical Exam:  
GENERAL: A&Ox3, cooperative, no distress EYE: PERRL, EOMIs, mucous membranes moist, non-icteric NECK: supple, symmetric. No JVD or carotid bruits LUNG: clear to auscultation bilaterally HEART: regular rate and rhythm ABDOMEN: soft, non-tender, non-distended. Bowel sounds normal.  
EXTREMITIES: RLE 1-5 toes with dark discoloration dorsum of foot and pallor distally. Multiple ulcerations RLE dorsum/lateral/medial foot. RLE hyperemia. RLE medial calf wound vac in place and draining <20mL blood tinged drainage. No purulent drainage seen. Scant nonpitting BLE ankle edema. NTTP BLE. Palpable 2+ BUE radial pulses. NEUROLOGIC: Decreased RLE foot motor 1/5.  strength 5/5. Motor 5/5 LLE. Face symmetric. Tongue midline Lab/Data Review: 
BMP:  
Lab Results Component Value Date/Time   01/31/2019 06:10 AM  
 K 4.7 01/31/2019 06:10 AM  
  01/31/2019 06:10 AM  
 CO2 30 01/31/2019 06:10 AM  
 AGAP 6 01/31/2019 06:10 AM  
  (H) 01/31/2019 06:10 AM  
 BUN 23 (H) 01/31/2019 06:10 AM  
 CREA 0.66 01/31/2019 06:10 AM  
 GFRAA >60 01/31/2019 06:10 AM  
 GFRNA >60 01/31/2019 06:10 AM  
 
CBC:  
Lab Results Component Value Date/Time WBC 12.0 01/31/2019 06:10 AM  
 HGB 10.9 (L) 01/31/2019 06:10 AM  
 HCT 34.8 (L) 01/31/2019 06:10 AM  
  01/31/2019 06:10 AM  
  
CULTURE SURGICAL 1/30/19:  
Culture result: (Abnormal)      Preliminary FEW GRAM NEGATIVE RODS Abnormal    
Culture result: (Abnormal)      Preliminary FEW 2ND GRAM NEGATIVE AMAME Abnormal    
Culture result: (Abnormal) Abnormal  
FEW  
3RD GRAM NEGATIVE MAAME       Preliminary Culture result: (Abnormal)      Preliminary MODERATE STAPHYLOCOCCUS SPECIES Abnormal   
 
Assessment:  
 
Principal Problem: 
  Surgical wound dehiscence (1/29/2019) Active Problems: Hypertension (12/26/2018) Peripheral arterial disease ([facility] [address] (12/26/2018) COPD (chronic obstructive pulmonary disease) ([facility] [address] (12/26/2018) Insomnia (12/26/2018) POD#1 RLE w/o and wound vac placement. HTN Insomnia S/p 12/2018 RLE Fem-POP bypass with non-reversed GSV S/p 12/2018 Aortogram RLE POP atherectomy/PTA/stent placement S/p 05/2015 Aortogram with L EIA stent Plan:  
 
Plan for RLE BKA vs. AKA tomorrow - consent obtained per patient Discussed with and patient/patient's family understands risk/benefits to procedure including bleeding, infection, pain, allergic reaction Would like to proceed NPO after midnight PT/INR in AM  
Continue ASA Continue Abx Continue wound vac for now - wound care consult can be cancelled Care management to discuss rehab options Signed By: Jon Solano January 31, 2019

## 2019-01-31 NOTE — PROGRESS NOTES
Problem: Falls - Risk of 
Goal: *Absence of Falls Document Kenneth Carrion Fall Risk and appropriate interventions in the flowsheet. Outcome: Progressing Towards Goal 
Fall Risk Interventions: 
Mobility Interventions: Communicate number of staff needed for ambulation/transfer Medication Interventions: Bed/chair exit alarm, Patient to call before getting OOB Problem: Pressure Injury - Risk of 
Goal: *Prevention of pressure injury Document Gee Scale and appropriate interventions in the flowsheet. Outcome: Progressing Towards Goal 
Pressure Injury Interventions: 
Sensory Interventions: Assess changes in LOC Moisture Interventions: Maintain skin hydration (lotion/cream) Activity Interventions: Pressure redistribution bed/mattress(bed type) Mobility Interventions: Pressure redistribution bed/mattress (bed type) Nutrition Interventions: Document food/fluid/supplement intake

## 2019-01-31 NOTE — PROGRESS NOTES
Patient received in bed awake. Patient alert and oriented X4, denies pain and discomfort. Patient resting quietly. Frequent use items within reach. Bed locked in low position. Call bell within reach and patient verbalized understanding of use for assistance and needs. Dual skin assessment conducted with Batsheva Huang RN and Felton Armstrong RN. See skin man chart for skin assessment. 1935:   Informed oncoming RN that CHG bath needs to be completed in the AM along with the pre-procedure check list.

## 2019-01-31 NOTE — PROGRESS NOTES
Late entry for 1/30/19:  Met with pt & daughter to discuss SNF option, SNF list provided, dtr states they have a SNF list & would like [facility] or [facility], posted in e-discharge. Will cont to follow. Pat [address] [facility], ext [phone].

## 2019-01-31 NOTE — PROGRESS NOTES
Pharmacy Dosing Services: Vancomycin Indication: skin and soft tissue infection/wound dehiscence Day of therapy: 3 Other Antimicrobials (Include dose, start day & day of therapy): 
Zosyn 3.375 g iv EI q8h (day 1: ) Loading dose (date given): 1500 mg 
Current Maintenance dose: 750 mg IV every 12 hours Goal Vancomycin Level: 15-20 
(Trough 15-20 for most infections, 20 for meningitis/osteomyelitis, pre-HD level ~25) Vancomycin Level (if drawn):  
: 12 (12 hours post-dose - early) Significant Cultures:  
 Wound - GPC, GNR (4 morphotypes)  Surgical - GNR Renal function stable? (unstable defined as SCr increase of 0.5 mg/dL or > 50% increase from baseline, whichever is greater) (Y/N): Y  
 
CAPD, Hemodialysis or Renal Replacement Therapy (Y/N): N Recent Labs  
  19 
0610 19 
0415 19 
1519 CREA 0.66 0.77 0.71  
BUN 23* 26* 23* WBC 12.0  --  11.5 Temp (24hrs), Av °F (36.7 °C), Min:97.6 °F (36.4 °C), Max:98.3 °F (36.8 °C) Creatinine Clearance (Creatinine Clearance (ml/min)):  ~75 ml/min Regimen assessment:  
-Continue current regimen - will account for accumulation 
-Per nurse, patient was in PACU so medication was held until patient was back on floor Maintenance dose: 750 mg iv q12h Next scheduled level:   at 0830 Pharmacy will follow daily and adjust medications as appropriate for renal function and/or serum levels.  
 
Thank you, 
Alisa Maki, PHARMD

## 2019-01-31 NOTE — CDMP QUERY
The medical record reflects the following: 
 
Risk: 75 yo male admitted with surgical  wound dehiscence Clinical Indicators:1/30 per Dietician note Meets Criteria for  Severe Malnutrition, as evidenced by: 
-Moderate muscle wasting, loss of subcutaneous fat 
 
-Unintentional weight loss related to inadequate energy absorption PTA as evidenced by a total of 13 lb or 9.3% loss x 11-12 months with 8 lb or 6% x past 3 weeks per documented weight records. Treatment: 1. When medically indicated advance to Dental Soft Solid Diet (chopped meats w/ gravy) per preference 2. Ensure Enlive TID (Chocolate) once diet advanced 3. Monitor labs, weight and PO intake 4. RD to follow Please clarify if this patient is being treated/managed for: 
 
=>severe protein calorie malnutrition =>Other Explanation of clinical findings =>Unable to Determine (no explanation of clinical findings) Please clarify and document your clinical opinion in the progress notes and discharge summary including the definitive and/or presumptive diagnosis, (suspected or probable), related to the above clinical findings. Please include clinical findings supporting your diagnosis. If you DECLINE this query or would like to communicate with [facility], please utilize the \"[email or web address] message box\" at the TOP of the Progress Note on the right. Thank you, 
Orin Hernandez RN [facility]   [phone]

## 2019-01-31 NOTE — ANESTHESIA POSTPROCEDURE EVALUATION
Procedure(s): 
right leg wound washout possible wound vac placement. Anesthesia Post Evaluation Multimodal analgesia: multimodal analgesia used between 6 hours prior to anesthesia start to PACU discharge Patient location during evaluation: bedside Patient participation: complete - patient participated Level of consciousness: awake Pain score: 0 Pain management: adequate Airway patency: patent Anesthetic complications: no 
Cardiovascular status: stable Respiratory status: acceptable Hydration status: acceptable Post anesthesia nausea and vomiting:  none Visit Vitals /67 (BP 1 Location: Left arm, BP Patient Position: At rest) Pulse 71 Temp 36.7 °C (98 °F) Resp 16 Ht 5' 7\" (1.702 m) Wt 57.2 kg (126 lb) SpO2 94% BMI 19.73 kg/m²

## 2019-01-31 NOTE — ROUTINE PROCESS
Bedside and Verbal shift change report given to Juany Childs RN (oncoming nurse) by Carly Mena RN (offgoing nurse). Report included the following information SBAR, Kardex, Intake/Output, MAR and Recent Results.

## 2019-01-31 NOTE — PROGRESS NOTES
Internal Medicine Progress Note Patient's Name: Nini De Anda Admit Date: 1/29/2019 Length of Stay: 2 Assessment/Plan Principal Problem: 
  Surgical wound dehiscence (1/29/2019) Active Problems: Hypertension (12/26/2018) Peripheral arterial disease ([facility] [address] (12/26/2018) COPD (chronic obstructive pulmonary disease) ([facility] [address] (12/26/2018) Insomnia (12/26/2018) Surgical would dehiscence s/p arthrectomy - IV antibiotics - vanc, zosyn 
- wound cx 1/29: 4 different GNR 
- Per Vascular Surgery to OR 1/30 
- Pain control 
- wound washout with debridement, wound vac placed per VS 
- repeat wound cx 1/30 pending - awaiting VS recommendations HTN 
- BP control - cont home meds COPD 
- Cont nebulizers Insomnia - temazepam qhs PRN 
 
 
- Cont acceptable home medications for chronic conditions  
- DVT protocol 
- discharge: TBD I have personally reviewed all pertinent labs and films that have officially resulted over the last 24 hours. I have personally checked for all pending labs that are awaiting final results. Interval History Nini De Anda is a 74 y.o. male who has been seen for medical management. He states he was admitted for amputation. He was directly admitted from Dr. Cool Duty office for planned surgery tomorrow due to surgical wound dehisence (right leg wound washout with possible wound vac placement). He was recently admitted 12/26-1/4 where he underwent atherectomy and stent placement of R pop artery occlusion. He was started on IV vancomycin and zosyn. Wound cx on 1/29 with 4 different GNR. To OR 1/30 per VS. Scheduled brovana, pulmicort for COPD. Repeat wound cx done 1/30 - pending. Awaiting VS recommendations. Subjective Pt s/e @ bedside. No major events overnight. Denies CP or SOB. Denies abd pain, nvd. Objective Visit Vitals /67 (BP 1 Location: Left arm, BP Patient Position: At rest) Pulse 71  
 Temp 98 °F (36.7 °C) Resp 16 Ht 5' 7\" (1.702 m) Wt 57.2 kg (126 lb) SpO2 94% BMI 19.73 kg/m² Physical Exam: 
General Appearance: NAD, conversant HENT: normocephalic/atraumatic, moist mucus membranes Neck: No JVD, supple Lungs: minimal exp wheeze with normal respiratory effort CV: RRR, no m/r/g Abdomen: soft, non-tender, normal bowel sounds Extremities: no cyanosis, no peripheral edema, RLE without dressing- multiple wounds noted, wound vac in place on medial RLE Neuro: No focal deficits, motor/sensory intact Skin: Normal color, intact Intake and Output: 
Current Shift:  No intake/output data recorded. Last three shifts:  01/29 1901 - 01/31 0700 In: 1150 [I.V.:1150] Out: 5 Lab/Data Reviewed: 
BMP:  
Lab Results Component Value Date/Time  01/31/2019 06:10 AM  
 K 4.7 01/31/2019 06:10 AM  
  01/31/2019 06:10 AM  
 CO2 30 01/31/2019 06:10 AM  
 AGAP 6 01/31/2019 06:10 AM  
  (H) 01/31/2019 06:10 AM  
 BUN 23 (H) 01/31/2019 06:10 AM  
 CREA 0.66 01/31/2019 06:10 AM  
 GFRAA >60 01/31/2019 06:10 AM  
 GFRNA >60 01/31/2019 06:10 AM  
 
CBC:  
Lab Results Component Value Date/Time WBC 12.0 01/31/2019 06:10 AM  
 HGB 10.9 (L) 01/31/2019 06:10 AM  
 HCT 34.8 (L) 01/31/2019 06:10 AM  
  01/31/2019 06:10 AM  
 
 
Imaging Reviewed: 
No results found. Medications Reviewed: 
Current Facility-Administered Medications Medication Dose Route Frequency  labetalol (NORMODYNE;TRANDATE) 20 mg/4 mL (5 mg/mL) injection 5 mg  5 mg IntraVENous PRN  piperacillin-tazobactam (ZOSYN) 3.375 g in 0.9% sodium chloride (MBP/ADV) 100 mL MBP \"EXTENDED 4 HOUR INFUSION###\"   3.375 g IntraVENous Q8H  
 VANCOMYCIN INFORMATION NOTE 1 Each  1 Each Other Rx Dosing/Monitoring  vancomycin (VANCOCIN) 750 mg in 0.9% sodium chloride (MBP/ADV) 250 mL ADV  750 mg IntraVENous Q12H  aspirin chewable tablet 81 mg  81 mg Oral DAILY  temazepam (RESTORIL) capsule 30 mg  30 mg Oral QHS PRN  
 arformoterol (BROVANA) neb solution 15 mcg  15 mcg Nebulization BID RT And  
 budesonide (PULMICORT) 500 mcg/2 ml nebulizer suspension  500 mcg Nebulization BID RT  
 acetaminophen (TYLENOL) tablet 650 mg  650 mg Oral Q4H PRN  
 HYDROcodone-acetaminophen (NORCO) 5-325 mg per tablet 1 Tab  1 Tab Oral Q6H PRN  
 oxyCODONE-acetaminophen (PERCOCET) 5-325 mg per tablet 1 Tab  1 Tab Oral Q6H PRN  
 losartan (COZAAR) tablet 100 mg  100 mg Oral DAILY  hydroCHLOROthiazide (HYDRODIURIL) tablet 25 mg  25 mg Oral DAILY Chula Akhtar PA-C [address] [place], [place] [address]ty Group Hospitalist Division Office:  [phone] Pager: [phone]

## 2019-01-31 NOTE — PROGRESS NOTES
Physical Exam  
Skin:  
 
  
 Dual skin assessment conducted with Syed Cardoza RN and Kianna Flaherty RN.

## 2019-01-31 NOTE — PROGRESS NOTES
Assume care of patient lying in bed requesting meal after returning from recovery room. Alert and oriented X 4. Bed locked in lowest position. Call light within reach and understand to use for assistance and needs.

## 2019-02-01 ENCOUNTER — ANESTHESIA (OUTPATIENT)
Dept: SURGERY | Age: 74
DRG: 901 | End: 2019-02-01
Payer: MEDICARE

## 2019-02-01 ENCOUNTER — ANESTHESIA EVENT (OUTPATIENT)
Dept: SURGERY | Age: 74
DRG: 901 | End: 2019-02-01
Payer: MEDICARE

## 2019-02-01 LAB
ABO + RH BLD: NORMAL
ANION GAP SERPL CALC-SCNC: 7 MMOL/L (ref 3–18)
BLOOD GROUP ANTIBODIES SERPL: NORMAL
BUN SERPL-MCNC: 19 MG/DL (ref 7–18)
BUN/CREAT SERPL: 30 (ref 12–20)
CALCIUM SERPL-MCNC: 9.1 MG/DL (ref 8.5–10.1)
CHLORIDE SERPL-SCNC: 100 MMOL/L (ref 100–108)
CO2 SERPL-SCNC: 31 MMOL/L (ref 21–32)
CREAT SERPL-MCNC: 0.64 MG/DL (ref 0.6–1.3)
GLUCOSE SERPL-MCNC: 94 MG/DL (ref 74–99)
INR PPP: 1.1 (ref 0.8–1.2)
POTASSIUM SERPL-SCNC: 4.1 MMOL/L (ref 3.5–5.5)
PROTHROMBIN TIME: 14 SEC (ref 11.5–15.2)
SODIUM SERPL-SCNC: 138 MMOL/L (ref 136–145)
SPECIMEN EXP DATE BLD: NORMAL

## 2019-02-01 PROCEDURE — 3E0T3BZ INTRODUCTION OF ANESTHETIC AGENT INTO PERIPHERAL NERVES AND PLEXI, PERCUTANEOUS APPROACH: ICD-10-PCS | Performed by: ANESTHESIOLOGY

## 2019-02-01 PROCEDURE — 77030002916 HC SUT ETHLN J&J -A: Performed by: SURGERY

## 2019-02-01 PROCEDURE — 77030008463 HC STPLR SKN PROX J&J -B: Performed by: SURGERY

## 2019-02-01 PROCEDURE — 3331090001 HH PPS REVENUE CREDIT

## 2019-02-01 PROCEDURE — 64450 NJX AA&/STRD OTHER PN/BRANCH: CPT | Performed by: ANESTHESIOLOGY

## 2019-02-01 PROCEDURE — 74011250637 HC RX REV CODE- 250/637: Performed by: HOSPITALIST

## 2019-02-01 PROCEDURE — 85610 PROTHROMBIN TIME: CPT

## 2019-02-01 PROCEDURE — 74011000250 HC RX REV CODE- 250: Performed by: SURGERY

## 2019-02-01 PROCEDURE — 74011250636 HC RX REV CODE- 250/636: Performed by: ANESTHESIOLOGY

## 2019-02-01 PROCEDURE — 88311 DECALCIFY TISSUE: CPT

## 2019-02-01 PROCEDURE — 3331090002 HH PPS REVENUE DEBIT

## 2019-02-01 PROCEDURE — 36415 COLL VENOUS BLD VENIPUNCTURE: CPT

## 2019-02-01 PROCEDURE — 77030037878 HC DRSG MEPILEX >48IN BORD MOLN -B

## 2019-02-01 PROCEDURE — 74011000258 HC RX REV CODE- 258: Performed by: PHYSICIAN ASSISTANT

## 2019-02-01 PROCEDURE — 76210000006 HC OR PH I REC 0.5 TO 1 HR: Performed by: SURGERY

## 2019-02-01 PROCEDURE — 74011000272 HC RX REV CODE- 272: Performed by: SURGERY

## 2019-02-01 PROCEDURE — 74011250636 HC RX REV CODE- 250/636

## 2019-02-01 PROCEDURE — 94640 AIRWAY INHALATION TREATMENT: CPT

## 2019-02-01 PROCEDURE — 77030003029 HC SUT VCRL J&J -B: Performed by: SURGERY

## 2019-02-01 PROCEDURE — 77030011265 HC ELECTRD BLD HEX COVD -A: Performed by: SURGERY

## 2019-02-01 PROCEDURE — 77030013079 HC BLNKT BAIR HGGR 3M -A: Performed by: ANESTHESIOLOGY

## 2019-02-01 PROCEDURE — 74011250636 HC RX REV CODE- 250/636: Performed by: NURSE ANESTHETIST, CERTIFIED REGISTERED

## 2019-02-01 PROCEDURE — 88307 TISSUE EXAM BY PATHOLOGIST: CPT

## 2019-02-01 PROCEDURE — 77030031139 HC SUT VCRL2 J&J -A: Performed by: SURGERY

## 2019-02-01 PROCEDURE — 77030006822 HC BLD SAW SAG BRSM -B: Performed by: SURGERY

## 2019-02-01 PROCEDURE — 74011000250 HC RX REV CODE- 250

## 2019-02-01 PROCEDURE — 80048 BASIC METABOLIC PNL TOTAL CA: CPT

## 2019-02-01 PROCEDURE — 74011000250 HC RX REV CODE- 250: Performed by: HOSPITALIST

## 2019-02-01 PROCEDURE — 65270000029 HC RM PRIVATE

## 2019-02-01 PROCEDURE — 76942 ECHO GUIDE FOR BIOPSY: CPT | Performed by: ANESTHESIOLOGY

## 2019-02-01 PROCEDURE — 0Y6C0Z3 DETACHMENT AT RIGHT UPPER LEG, LOW, OPEN APPROACH: ICD-10-PCS | Performed by: SURGERY

## 2019-02-01 PROCEDURE — 76010000149 HC OR TIME 1 TO 1.5 HR: Performed by: SURGERY

## 2019-02-01 PROCEDURE — 77030012510 HC MSK AIRWY LMA TELE -B: Performed by: ANESTHESIOLOGY

## 2019-02-01 PROCEDURE — 74011250636 HC RX REV CODE- 250/636: Performed by: PHYSICIAN ASSISTANT

## 2019-02-01 PROCEDURE — 86900 BLOOD TYPING SEROLOGIC ABO: CPT

## 2019-02-01 PROCEDURE — 76060000033 HC ANESTHESIA 1 TO 1.5 HR: Performed by: SURGERY

## 2019-02-01 PROCEDURE — 77030002996 HC SUT SLK J&J -A: Performed by: SURGERY

## 2019-02-01 RX ORDER — PROPOFOL 10 MG/ML
INJECTION, EMULSION INTRAVENOUS AS NEEDED
Status: DISCONTINUED | OUTPATIENT
Start: 2019-02-01 | End: 2019-02-01 | Stop reason: HOSPADM

## 2019-02-01 RX ORDER — SODIUM CHLORIDE, SODIUM LACTATE, POTASSIUM CHLORIDE, CALCIUM CHLORIDE 600; 310; 30; 20 MG/100ML; MG/100ML; MG/100ML; MG/100ML
125 INJECTION, SOLUTION INTRAVENOUS CONTINUOUS
Status: DISCONTINUED | OUTPATIENT
Start: 2019-02-01 | End: 2019-02-01 | Stop reason: HOSPADM

## 2019-02-01 RX ORDER — ONDANSETRON 2 MG/ML
4 INJECTION INTRAMUSCULAR; INTRAVENOUS ONCE
Status: COMPLETED | OUTPATIENT
Start: 2019-02-01 | End: 2019-02-01

## 2019-02-01 RX ORDER — FENTANYL CITRATE 50 UG/ML
50 INJECTION, SOLUTION INTRAMUSCULAR; INTRAVENOUS
Status: DISCONTINUED | OUTPATIENT
Start: 2019-02-01 | End: 2019-02-01 | Stop reason: HOSPADM

## 2019-02-01 RX ORDER — HYDROMORPHONE HYDROCHLORIDE 2 MG/ML
0.5 INJECTION, SOLUTION INTRAMUSCULAR; INTRAVENOUS; SUBCUTANEOUS
Status: DISCONTINUED | OUTPATIENT
Start: 2019-02-01 | End: 2019-02-01 | Stop reason: HOSPADM

## 2019-02-01 RX ORDER — ROPIVACAINE HYDROCHLORIDE 5 MG/ML
INJECTION, SOLUTION EPIDURAL; INFILTRATION; PERINEURAL
Status: COMPLETED | OUTPATIENT
Start: 2019-02-01 | End: 2019-02-01

## 2019-02-01 RX ORDER — EPHEDRINE SULFATE 50 MG/ML
INJECTION, SOLUTION INTRAVENOUS AS NEEDED
Status: DISCONTINUED | OUTPATIENT
Start: 2019-02-01 | End: 2019-02-01 | Stop reason: HOSPADM

## 2019-02-01 RX ORDER — FENTANYL CITRATE 50 UG/ML
INJECTION, SOLUTION INTRAMUSCULAR; INTRAVENOUS
Status: COMPLETED | OUTPATIENT
Start: 2019-02-01 | End: 2019-02-01

## 2019-02-01 RX ORDER — MIDAZOLAM HYDROCHLORIDE 1 MG/ML
INJECTION, SOLUTION INTRAMUSCULAR; INTRAVENOUS
Status: COMPLETED | OUTPATIENT
Start: 2019-02-01 | End: 2019-02-01

## 2019-02-01 RX ORDER — MIDAZOLAM HYDROCHLORIDE 1 MG/ML
INJECTION, SOLUTION INTRAMUSCULAR; INTRAVENOUS
Status: COMPLETED
Start: 2019-02-01 | End: 2019-02-01

## 2019-02-01 RX ORDER — MIDAZOLAM HYDROCHLORIDE 1 MG/ML
2 INJECTION, SOLUTION INTRAMUSCULAR; INTRAVENOUS ONCE
Status: COMPLETED | OUTPATIENT
Start: 2019-02-01 | End: 2019-02-01

## 2019-02-01 RX ORDER — FENTANYL CITRATE 50 UG/ML
INJECTION, SOLUTION INTRAMUSCULAR; INTRAVENOUS
Status: COMPLETED
Start: 2019-02-01 | End: 2019-02-01

## 2019-02-01 RX ORDER — LIDOCAINE HYDROCHLORIDE 20 MG/ML
INJECTION, SOLUTION EPIDURAL; INFILTRATION; INTRACAUDAL; PERINEURAL AS NEEDED
Status: DISCONTINUED | OUTPATIENT
Start: 2019-02-01 | End: 2019-02-01 | Stop reason: HOSPADM

## 2019-02-01 RX ORDER — FENTANYL CITRATE 50 UG/ML
50 INJECTION, SOLUTION INTRAMUSCULAR; INTRAVENOUS AS NEEDED
Status: DISCONTINUED | OUTPATIENT
Start: 2019-02-01 | End: 2019-02-01 | Stop reason: HOSPADM

## 2019-02-01 RX ORDER — SODIUM CHLORIDE, SODIUM LACTATE, POTASSIUM CHLORIDE, CALCIUM CHLORIDE 600; 310; 30; 20 MG/100ML; MG/100ML; MG/100ML; MG/100ML
25 INJECTION, SOLUTION INTRAVENOUS CONTINUOUS
Status: DISCONTINUED | OUTPATIENT
Start: 2019-02-01 | End: 2019-02-01 | Stop reason: HOSPADM

## 2019-02-01 RX ADMIN — MIDAZOLAM HYDROCHLORIDE 2 MG: 1 INJECTION, SOLUTION INTRAMUSCULAR; INTRAVENOUS at 10:19

## 2019-02-01 RX ADMIN — LIDOCAINE HYDROCHLORIDE 50 MG: 20 INJECTION, SOLUTION EPIDURAL; INFILTRATION; INTRACAUDAL; PERINEURAL at 11:28

## 2019-02-01 RX ADMIN — ARFORMOTEROL TARTRATE 15 MCG: 15 SOLUTION RESPIRATORY (INHALATION) at 20:20

## 2019-02-01 RX ADMIN — BUDESONIDE 500 MCG: 0.5 INHALANT RESPIRATORY (INHALATION) at 20:21

## 2019-02-01 RX ADMIN — FENTANYL CITRATE 100 MCG: 50 INJECTION, SOLUTION INTRAMUSCULAR; INTRAVENOUS at 10:19

## 2019-02-01 RX ADMIN — TEMAZEPAM 30 MG: 15 CAPSULE ORAL at 21:43

## 2019-02-01 RX ADMIN — SODIUM CHLORIDE 750 MG: 900 INJECTION, SOLUTION INTRAVENOUS at 08:04

## 2019-02-01 RX ADMIN — FENTANYL CITRATE 100 MCG: 50 INJECTION, SOLUTION INTRAMUSCULAR; INTRAVENOUS at 10:00

## 2019-02-01 RX ADMIN — PROPOFOL 150 MG: 10 INJECTION, EMULSION INTRAVENOUS at 11:28

## 2019-02-01 RX ADMIN — SODIUM CHLORIDE 750 MG: 900 INJECTION, SOLUTION INTRAVENOUS at 20:27

## 2019-02-01 RX ADMIN — PIPERACILLIN SODIUM,TAZOBACTAM SODIUM 3.38 G: 3; .375 INJECTION, POWDER, FOR SOLUTION INTRAVENOUS at 04:13

## 2019-02-01 RX ADMIN — PIPERACILLIN SODIUM,TAZOBACTAM SODIUM 3.38 G: 3; .375 INJECTION, POWDER, FOR SOLUTION INTRAVENOUS at 20:22

## 2019-02-01 RX ADMIN — ARFORMOTEROL TARTRATE 15 MCG: 15 SOLUTION RESPIRATORY (INHALATION) at 07:13

## 2019-02-01 RX ADMIN — PROPOFOL 50 MG: 10 INJECTION, EMULSION INTRAVENOUS at 12:01

## 2019-02-01 RX ADMIN — PIPERACILLIN SODIUM,TAZOBACTAM SODIUM 3.38 G: 3; .375 INJECTION, POWDER, FOR SOLUTION INTRAVENOUS at 14:09

## 2019-02-01 RX ADMIN — OXYCODONE AND ACETAMINOPHEN 1 TABLET: 5; 325 TABLET ORAL at 21:43

## 2019-02-01 RX ADMIN — ONDANSETRON 4 MG: 2 INJECTION INTRAMUSCULAR; INTRAVENOUS at 12:52

## 2019-02-01 RX ADMIN — OXYCODONE AND ACETAMINOPHEN 1 TABLET: 5; 325 TABLET ORAL at 14:08

## 2019-02-01 RX ADMIN — EPHEDRINE SULFATE 20 MG: 50 INJECTION, SOLUTION INTRAVENOUS at 12:10

## 2019-02-01 RX ADMIN — SODIUM CHLORIDE, SODIUM LACTATE, POTASSIUM CHLORIDE, AND CALCIUM CHLORIDE 25 ML/HR: 600; 310; 30; 20 INJECTION, SOLUTION INTRAVENOUS at 09:50

## 2019-02-01 RX ADMIN — BUDESONIDE 500 MCG: 0.5 INHALANT RESPIRATORY (INHALATION) at 07:13

## 2019-02-01 RX ADMIN — ROPIVACAINE HYDROCHLORIDE 15 ML: 5 INJECTION, SOLUTION EPIDURAL; INFILTRATION; PERINEURAL at 10:19

## 2019-02-01 RX ADMIN — MIDAZOLAM HYDROCHLORIDE 2 MG: 1 INJECTION, SOLUTION INTRAMUSCULAR; INTRAVENOUS at 10:00

## 2019-02-01 NOTE — PERIOP NOTES
Dr. Jennie Turnerl in to see patient while nerve block was being done. Family back at patient's bedside and asked to speak to the Dr. I called Dr. Mccray Begin cell phone and left message, with number to reach [facility] nurse's desk for return call.

## 2019-02-01 NOTE — ROUTINE PROCESS
Bedside and Verbal shift change report given to Rubyrn (oncoming nurse) by nenita berrios rn (offgoing nurse). Report given with SBAR, Kardex, Intake/Output and Recent Results.

## 2019-02-01 NOTE — PROGRESS NOTES
PT order received and chart reviewed. Underwent RLE AKA today. Now with active bedrest orders. Will need updated activity level for participation in functional mobility for PT evaluation. Thank you, Aaron Wilson PT, DPT Office Extension: [phone] Pager: [phone]

## 2019-02-01 NOTE — PROGRESS NOTES
Assume care of patient from Ananya, RN pt awake in bed, A&Ox4  ,no distress noted and denies pain. Frequently used items and call bell within reach. Pt verbalized understanding to use call bell for any needs or assistance. Bed lock in lowest position. Patients pre surgical check list and wipe down to be done prior to surgery

## 2019-02-01 NOTE — PROGRESS NOTES
Noted in e-discharge that [facility] has declined pt as they do not have bed available. Sent updates in e-discharge, to [address] left  with admissions to see if they will have bed on Monday. Spoke with pt's dtr, Sania Foote. Made her aware that I will call her with update @ [phone]. Will cont to follow. Katalina Abdi RN,ext [phone].

## 2019-02-01 NOTE — ANESTHESIA PROCEDURE NOTES
Peripheral Block Start time: 2/1/2019 9:59 AM 
End time: 2/1/2019 10:12 AM 
Performed by: Delisa Cordoba MD 
Authorized by: Delisa Cordoba MD  
 
 
Pre-procedure: Indications: at surgeon's request and post-op pain management Preanesthetic Checklist: patient identified, risks and benefits discussed, site marked, timeout performed, anesthesia consent given and patient being monitored Timeout Time: 09:59 Block Type:  
Block Type:  Sciatic single shot Laterality:  Right Monitoring:  Continuous pulse ox, frequent vital sign checks, heart rate, oxygen and responsive to questions Procedures: nerve stimulator Patient Position: left lateral decubitus Prep: chlorhexidine Location:  Upper thigh Needle Type:  Stimuplex Needle Gauge:  21 G Needle Localization:  Nerve stimulator Motor Response: minimal motor response >0.4 mA Assessment: 
Number of attempts:  1 Injection Assessment:  Incremental injection every 5 mL, negative aspiration for blood and no intravascular symptoms Patient tolerance:  Patient tolerated the procedure well with no immediate complications

## 2019-02-01 NOTE — ANESTHESIA PREPROCEDURE EVALUATION
Anesthetic History No history of anesthetic complications Review of Systems / Medical History Patient summary reviewed and pertinent labs reviewed Pulmonary Within defined limits COPD: moderate Smoker Neuro/Psych Within defined limits Cardiovascular Within defined limits Hypertension PAD Exercise tolerance: >4 METS 
  
GI/Hepatic/Renal 
Within defined limits Endo/Other Within defined limits Other Findings Physical Exam 
 
Airway Mallampati: II 
TM Distance: 4 - 6 cm Neck ROM: normal range of motion Mouth opening: Normal 
 
 Cardiovascular Regular rate and rhythm,  S1 and S2 normal,  no murmur, click, rub, or gallop Rhythm: regular Rate: normal 
 
 
 
 Dental 
 
Dentition: Edentulous, Full upper dentures and Full lower dentures Pulmonary Breath sounds clear to auscultation Abdominal 
GI exam deferred Other Findings Anesthetic Plan ASA: 3 Anesthesia type: MAC Post-op pain plan if not by surgeon: peripheral nerve block single Induction: Intravenous Anesthetic plan and risks discussed with: Patient Surgeon request Fem/ Sciatic block. Explained to pt. Pt accepts.

## 2019-02-01 NOTE — PROGRESS NOTES
OT order received, chart reviewed. Pt with R AKA today; not appropriate for mobilization at this time. Will continue to follow. Miguel Wagner MS OTR/L Office Ext: [phone] Pager: [phone]

## 2019-02-01 NOTE — PROGRESS NOTES
Internal Medicine Progress Note Patient's Name: Chris Castillo Admit Date: 1/29/2019 Length of Stay: 3 Assessment/Plan Principal Problem: 
  Surgical wound dehiscence (1/29/2019) Active Problems: COPD (chronic obstructive pulmonary disease) ([facility] [address] (12/26/2018) Hypertension (12/26/2018) Peripheral arterial disease ([facility] [address] (12/26/2018) Insomnia (12/26/2018) Surgical would dehiscence - IV antibiotics - vanc, zosyn 
- wound cx 1/29: 4 different GNR 
- Per Vascular Surgery to OR 1/30 
- Pain control PRN 
- wound washout with debridement, wound vac placed per VS 
- repeat wound cx 1/30 with multiple GNRs 
- RLE AKA done on 2/1 - pt tolerated well 
- PT/OT per VS 
 
COPD 
- Cont brovana, pulmicort HTN 
- BP control - cont home meds Insomnia - temazepam qhs PRN 
 
 
- Cont acceptable home medications for chronic conditions  
- DVT protocol 
- discharge: TBD I have personally reviewed all pertinent labs and films that have officially resulted over the last 24 hours. I have personally checked for all pending labs that are awaiting final results. Interval History Chris Castillo is a 74 y.o. male who has been seen for medical management. He states he was admitted for amputation. He was directly admitted from Dr. Beaulieu Stager office for planned surgery tomorrow due to surgical wound dehisence (right leg wound washout with possible wound vac placement). He was recently admitted 12/26-1/4 where he underwent atherectomy and stent placement of R pop artery occlusion. He was started on IV vancomycin and zosyn. Scheduled brovana, pulmicort for COPD. Wound cx on 1/29 with 4 different GNR. To OR 1/30 - wound washout with debridement, wound vac placed per VS. Repeat wound cx done 1/30 - still with multiple GNR. RLE AKA done 2/1 per VS - pt tolerated well. Subjective Pt s/e @ bedside. No major events overnight. Denies CP or SOB. Denies abd pain, nvd. Objective Visit Vitals /74 (BP 1 Location: Left arm, BP Patient Position: At rest;Lying right side) Pulse 74 Temp 98.8 °F (37.1 °C) Resp 17 Ht 5' 7\" (1.702 m) Wt 57.2 kg (126 lb) SpO2 98% BMI 19.73 kg/m² Physical Exam: 
General Appearance: NAD, conversant HENT: normocephalic/atraumatic, moist mucus membranes Neck: No JVD, supple Lungs: CTA with normal respiratory effort CV: RRR, no m/r/g Abdomen: soft, non-tender, normal bowel sounds Extremities: no cyanosis, no peripheral edema, RLE without dressing- multiple wounds noted, wound vac in place on medial RLE Neuro: No focal deficits, motor/sensory intact Skin: Normal color, intact Intake and Output: 
Current Shift:  No intake/output data recorded. Last three shifts:  01/30 1901 - 02/01 0700 In: 1080 [P.O.:1080] Out: 2095 [Urine:2070; Drains:25] Lab/Data Reviewed: 
BMP:  
Lab Results Component Value Date/Time  02/01/2019 05:10 AM  
 K 4.1 02/01/2019 05:10 AM  
  02/01/2019 05:10 AM  
 CO2 31 02/01/2019 05:10 AM  
 AGAP 7 02/01/2019 05:10 AM  
 GLU 94 02/01/2019 05:10 AM  
 BUN 19 (H) 02/01/2019 05:10 AM  
 CREA 0.64 02/01/2019 05:10 AM  
 GFRAA >60 02/01/2019 05:10 AM  
 GFRNA >60 02/01/2019 05:10 AM  
 
CBC:  
No results found for: WBC, HGB, HGBEXT, HCT, HCTEXT, PLT, PLTEXT, HGBEXT, HCTEXT, PLTEXT Imaging Reviewed: 
No results found. Medications Reviewed: 
Current Facility-Administered Medications Medication Dose Route Frequency  [START ON 2/2/2019] VANCOMYCIN INFORMATION NOTE   Other ONCE  
 labetalol (NORMODYNE;TRANDATE) 20 mg/4 mL (5 mg/mL) injection 5 mg  5 mg IntraVENous PRN  piperacillin-tazobactam (ZOSYN) 3.375 g in 0.9% sodium chloride (MBP/ADV) 100 mL MBP \"EXTENDED 4 HOUR INFUSION###\"   3.375 g IntraVENous Q8H  
 VANCOMYCIN INFORMATION NOTE 1 Each  1 Each Other Rx Dosing/Monitoring  vancomycin (VANCOCIN) 750 mg in 0.9% sodium chloride (MBP/ADV) 250 mL ADV  750 mg IntraVENous Q12H  aspirin chewable tablet 81 mg  81 mg Oral DAILY  temazepam (RESTORIL) capsule 30 mg  30 mg Oral QHS PRN  
 arformoterol (BROVANA) neb solution 15 mcg  15 mcg Nebulization BID RT And  
 budesonide (PULMICORT) 500 mcg/2 ml nebulizer suspension  500 mcg Nebulization BID RT  
 acetaminophen (TYLENOL) tablet 650 mg  650 mg Oral Q4H PRN  
 HYDROcodone-acetaminophen (NORCO) 5-325 mg per tablet 1 Tab  1 Tab Oral Q6H PRN  
 oxyCODONE-acetaminophen (PERCOCET) 5-325 mg per tablet 1 Tab  1 Tab Oral Q6H PRN  
 losartan (COZAAR) tablet 100 mg  100 mg Oral DAILY  hydroCHLOROthiazide (HYDRODIURIL) tablet 25 mg  25 mg Oral DAILY Danielle Driver PA-C [address] [place], [place] [address] Office:  [phone] Pager: [phone]

## 2019-02-01 NOTE — INTERVAL H&P NOTE
H&P Update: 
Florentino Alford was seen and examined. History and physical has been reviewed. The patient has been examined.  There have been no significant clinical changes since the completion of the originally dated History and Physical. 
 
Signed By: Maxine Rivera MD   
 February 1, 2019 10:20 AM

## 2019-02-01 NOTE — ANESTHESIA PROCEDURE NOTES
Peripheral Block Start time: 2/1/2019 9:59 AM 
End time: 2/1/2019 10:12 AM 
Performed by: Ami Maki MD 
Authorized by: Ami Maki MD  
 
 
Pre-procedure: Indications: at surgeon's request and post-op pain management Preanesthetic Checklist: patient identified, risks and benefits discussed, site marked, timeout performed, anesthesia consent given and patient being monitored Block Type:  
Block Type:  Femoral single shot Laterality:  Right Monitoring:  Standard ASA monitoring, continuous pulse ox, oxygen, responsive to questions, heart rate and frequent vital sign checks Injection Technique:  Single shot Procedures: ultrasound guided Patient Position: supine Prep: chlorhexidine Location:  Upper thigh Needle Type:  Stimuplex Needle Gauge:  22 G Needle Localization:  Ultrasound guidance and nerve stimulator Motor Response: minimal motor response >0.4 mA Assessment: 
Number of attempts:  1 Injection Assessment:  Incremental injection every 5 mL, no paresthesia, ultrasound image on chart, local visualized surrounding nerve on ultrasound, negative aspiration for blood and no intravascular symptoms Patient tolerance:  Patient tolerated the procedure well with no immediate complications For Vitals see nursing notes.   
 
Bal Shankar MD 
10:21 AM

## 2019-02-01 NOTE — PROGRESS NOTES
Problem: Falls - Risk of 
Goal: *Absence of Falls Document [facility] Fall Risk and appropriate interventions in the flowsheet. Outcome: Progressing Towards Goal 
Fall Risk Interventions: 
Mobility Interventions: Communicate number of staff needed for ambulation/transfer Medication Interventions: Bed/chair exit alarm, Patient to call before getting OOB Problem: Pressure Injury - Risk of 
Goal: *Prevention of pressure injury Document Gee Scale and appropriate interventions in the flowsheet. Outcome: Progressing Towards Goal 
Pressure Injury Interventions: 
Sensory Interventions: Assess changes in LOC Moisture Interventions: Maintain skin hydration (lotion/cream) Activity Interventions: Pressure redistribution bed/mattress(bed type) Mobility Interventions: Pressure redistribution bed/mattress (bed type) Nutrition Interventions: Document food/fluid/supplement intake

## 2019-02-01 NOTE — PERIOP NOTES
Received patient at 3351 to Pod 1-2. Patient alert and oriented x 4. Family at bedside. Wound Vac intact to right inner calf, and draining without incident. POC Son Yayo Guevara. [phone]. Nerve block completed by Dr. Janes Romero. Patient tolerated well. Will continue to monitor.

## 2019-02-01 NOTE — PROGRESS NOTES
Problem: Falls - Risk of 
Goal: *Absence of Falls Document [facility] Fall Risk and appropriate interventions in the flowsheet. Outcome: Progressing Towards Goal 
Fall Risk Interventions: 
Mobility Interventions: Communicate number of staff needed for ambulation/transfer Medication Interventions: Bed/chair exit alarm

## 2019-02-01 NOTE — ANESTHESIA POSTPROCEDURE EVALUATION
Procedure(s): 
right above knee amputation. Anesthesia Post Evaluation Multimodal analgesia: multimodal analgesia used between 6 hours prior to anesthesia start to PACU discharge Patient location during evaluation: bedside Patient participation: complete - patient participated Level of consciousness: awake Pain management: adequate Airway patency: patent Anesthetic complications: no 
Cardiovascular status: stable Respiratory status: acceptable Hydration status: acceptable Post anesthesia nausea and vomiting:  controlled Visit Vitals BP 99/47 (BP 1 Location: Right arm, BP Patient Position: At rest;Head of bed elevated (Comment degrees)) Pulse 83 Temp 37.1 °C (98.7 °F) Resp 19 Ht 5' 7\" (1.702 m) Wt 57.2 kg (126 lb) SpO2 100% BMI 19.73 kg/m²

## 2019-02-01 NOTE — PROGRESS NOTES
Problem: Pressure Injury - Risk of 
Goal: *Prevention of pressure injury Document Gee Scale and appropriate interventions in the flowsheet. Outcome: Progressing Towards Goal 
Pressure Injury Interventions: 
Sensory Interventions: Assess need for specialty bed, Keep linens dry and wrinkle-free Moisture Interventions: Absorbent underpads Activity Interventions: Pressure redistribution bed/mattress(bed type) Mobility Interventions: Assess need for specialty bed Nutrition Interventions: Document food/fluid/supplement intake

## 2019-02-01 NOTE — PROGRESS NOTES
Received sitting in bed. Family member visiting. Call light,phone in reach. No s/s distress. 2129 Restoril 30 mg po per pt request for sleep. Woody 44 2/1/19 0000 Npo after midnight,for surgery in am. 
0630 Uneventful shift. remains Npo.

## 2019-02-01 NOTE — BRIEF OP NOTE
BRIEF OPERATIVE NOTE Date of Procedure: 2/1/2019 Preoperative Diagnosis: i73.9 Postoperative Diagnosis: i73.9 Procedure(s): 
right above knee amputation Surgeon(s) and Role: * Pennie Sosa MD - Primary Surgical Staff: 
Circ-1: Wan Maria RN Physician Assistant: Alberto Posadas Scrub Tech-1: Masha Mace Scrub Tech-2: Windy Farr Surg Asst-1: Cathy Miller Event Time In Time Out Incision Start 02/01/2019 1144 Incision Close 02/01/2019 1226 Anesthesia: General  
Estimated Blood Loss: 200mL Specimens:  
ID Type Source Tests Collected by Time Destination 1 : RIGHT LEG, BELOW KNEE Fresh   Pennie Sosa MD 2/1/2019 1202 Pathology Findings: Good tissue at amputation site Complications: None Implants: * No implants in log *

## 2019-02-01 NOTE — PERIOP NOTES
1236 Pt received to PACU and connected to monitor. Bedside report given by Annie Silvestre RN. Vital signs stable. Nurse at bedside. Will continue to monitor. 46 Called to update pt's family on current status. Pt's brother Jaclyn Dorsey to contact. [phone] 1320 TRANSFER - OUT REPORT: 
 
Verbal report given to Lauren Ace RN on Kartik Zarate  being transferred to Room 2107 for routine post - op Report consisted of patients Situation, Background, Assessment and  
Recommendations(SBAR). Information from the following report(s) SBAR, Kardex and MAR was reviewed with the receiving nurse. Lines:  
Peripheral IV 02/01/19 Distal;Left Forearm (Active) Site Assessment Clean, dry, & intact 2/1/2019 12:46 PM  
Phlebitis Assessment 0 2/1/2019 12:46 PM  
Infiltration Assessment 0 2/1/2019 12:46 PM  
Dressing Status Clean, dry, & intact 2/1/2019 12:46 PM  
Dressing Type Transparent;Tape 2/1/2019 12:46 PM  
Hub Color/Line Status Blue; Infusing 2/1/2019 12:46 PM  
Action Taken Open ports on tubing capped 2/1/2019 12:46 PM  
Alcohol Cap Used Yes 2/1/2019 12:46 PM  
  
 
Opportunity for questions and clarification was provided. Patient transported with: 
 Registered Nurse

## 2019-02-01 NOTE — PROGRESS NOTES
Problem: Pressure Injury - Risk of 
Goal: *Prevention of pressure injury Document Gee Scale and appropriate interventions in the flowsheet. Outcome: Progressing Towards Goal 
Pressure Injury Interventions: 
Sensory Interventions: Assess changes in LOC Moisture Interventions: Absorbent underpads Activity Interventions: Pressure redistribution bed/mattress(bed type) Mobility Interventions: Pressure redistribution bed/mattress (bed type) Nutrition Interventions: Document food/fluid/supplement intake

## 2019-02-01 NOTE — ROUTINE PROCESS
Bedside and Verbal shift change report given to Ananya RN (oncoming nurse) by Forrest Palma RN 
 (offgoing nurse). Report included the following information SBAR, Kardex, MAR and Recent Results.

## 2019-02-02 PROBLEM — Z89.619 S/P AKA (ABOVE KNEE AMPUTATION) UNILATERAL (HCC): Status: ACTIVE | Noted: 2019-02-02

## 2019-02-02 LAB
ANION GAP SERPL CALC-SCNC: 8 MMOL/L (ref 3–18)
BACTERIA SPEC CULT: ABNORMAL
BUN SERPL-MCNC: 18 MG/DL (ref 7–18)
BUN/CREAT SERPL: 25 (ref 12–20)
CALCIUM SERPL-MCNC: 7.9 MG/DL (ref 8.5–10.1)
CHLORIDE SERPL-SCNC: 102 MMOL/L (ref 100–108)
CO2 SERPL-SCNC: 28 MMOL/L (ref 21–32)
CREAT SERPL-MCNC: 0.71 MG/DL (ref 0.6–1.3)
DATE LAST DOSE: NORMAL
GLUCOSE SERPL-MCNC: 155 MG/DL (ref 74–99)
GRAM STN SPEC: ABNORMAL
GRAM STN SPEC: ABNORMAL
POTASSIUM SERPL-SCNC: 4.5 MMOL/L (ref 3.5–5.5)
REPORTED DOSE,DOSE: NORMAL UNITS
REPORTED DOSE/TIME,TMG: 2100
SERVICE CMNT-IMP: ABNORMAL
SERVICE CMNT-IMP: ABNORMAL
SODIUM SERPL-SCNC: 138 MMOL/L (ref 136–145)
VANCOMYCIN TROUGH SERPL-MCNC: 10.8 UG/ML (ref 10–20)

## 2019-02-02 PROCEDURE — 3331090001 HH PPS REVENUE CREDIT

## 2019-02-02 PROCEDURE — 74011250636 HC RX REV CODE- 250/636: Performed by: HOSPITALIST

## 2019-02-02 PROCEDURE — 74011250636 HC RX REV CODE- 250/636: Performed by: PHYSICIAN ASSISTANT

## 2019-02-02 PROCEDURE — 36415 COLL VENOUS BLD VENIPUNCTURE: CPT

## 2019-02-02 PROCEDURE — 74011250637 HC RX REV CODE- 250/637: Performed by: SURGERY

## 2019-02-02 PROCEDURE — 74011250636 HC RX REV CODE- 250/636: Performed by: SURGERY

## 2019-02-02 PROCEDURE — 77030027138 HC INCENT SPIROMETER -A

## 2019-02-02 PROCEDURE — 94640 AIRWAY INHALATION TREATMENT: CPT

## 2019-02-02 PROCEDURE — 74011000258 HC RX REV CODE- 258: Performed by: PHYSICIAN ASSISTANT

## 2019-02-02 PROCEDURE — 80202 ASSAY OF VANCOMYCIN: CPT

## 2019-02-02 PROCEDURE — 80048 BASIC METABOLIC PNL TOTAL CA: CPT

## 2019-02-02 PROCEDURE — 3331090002 HH PPS REVENUE DEBIT

## 2019-02-02 PROCEDURE — 97162 PT EVAL MOD COMPLEX 30 MIN: CPT

## 2019-02-02 PROCEDURE — 65270000029 HC RM PRIVATE

## 2019-02-02 PROCEDURE — 97530 THERAPEUTIC ACTIVITIES: CPT

## 2019-02-02 PROCEDURE — 77030037878 HC DRSG MEPILEX >48IN BORD MOLN -B

## 2019-02-02 PROCEDURE — 74011000250 HC RX REV CODE- 250: Performed by: HOSPITALIST

## 2019-02-02 PROCEDURE — 74011250637 HC RX REV CODE- 250/637: Performed by: HOSPITALIST

## 2019-02-02 RX ORDER — HYDROMORPHONE HYDROCHLORIDE 1 MG/ML
1 INJECTION, SOLUTION INTRAMUSCULAR; INTRAVENOUS; SUBCUTANEOUS
Status: DISCONTINUED | OUTPATIENT
Start: 2019-02-02 | End: 2019-02-04 | Stop reason: HOSPADM

## 2019-02-02 RX ADMIN — OXYCODONE AND ACETAMINOPHEN 1 TABLET: 5; 325 TABLET ORAL at 02:28

## 2019-02-02 RX ADMIN — HYDROCODONE BITARTRATE AND ACETAMINOPHEN 1 TABLET: 5; 325 TABLET ORAL at 06:44

## 2019-02-02 RX ADMIN — HYDROMORPHONE HYDROCHLORIDE 1 MG: 1 INJECTION, SOLUTION INTRAMUSCULAR; INTRAVENOUS; SUBCUTANEOUS at 11:46

## 2019-02-02 RX ADMIN — PIPERACILLIN SODIUM,TAZOBACTAM SODIUM 3.38 G: 3; .375 INJECTION, POWDER, FOR SOLUTION INTRAVENOUS at 11:50

## 2019-02-02 RX ADMIN — OXYCODONE AND ACETAMINOPHEN 1 TABLET: 5; 325 TABLET ORAL at 15:14

## 2019-02-02 RX ADMIN — TEMAZEPAM 30 MG: 15 CAPSULE ORAL at 22:28

## 2019-02-02 RX ADMIN — ASPIRIN 81 MG 81 MG: 81 TABLET ORAL at 08:52

## 2019-02-02 RX ADMIN — PIPERACILLIN SODIUM,TAZOBACTAM SODIUM 3.38 G: 3; .375 INJECTION, POWDER, FOR SOLUTION INTRAVENOUS at 03:43

## 2019-02-02 RX ADMIN — OXYCODONE AND ACETAMINOPHEN 1 TABLET: 5; 325 TABLET ORAL at 08:50

## 2019-02-02 RX ADMIN — ARFORMOTEROL TARTRATE 15 MCG: 15 SOLUTION RESPIRATORY (INHALATION) at 20:40

## 2019-02-02 RX ADMIN — SODIUM CHLORIDE 1000 MG: 900 INJECTION, SOLUTION INTRAVENOUS at 21:19

## 2019-02-02 RX ADMIN — OXYCODONE AND ACETAMINOPHEN 1 TABLET: 5; 325 TABLET ORAL at 21:19

## 2019-02-02 RX ADMIN — BUDESONIDE 500 MCG: 0.5 INHALANT RESPIRATORY (INHALATION) at 20:40

## 2019-02-02 RX ADMIN — SODIUM CHLORIDE 750 MG: 900 INJECTION, SOLUTION INTRAVENOUS at 08:53

## 2019-02-02 RX ADMIN — HYDROMORPHONE HYDROCHLORIDE 1 MG: 1 INJECTION, SOLUTION INTRAMUSCULAR; INTRAVENOUS; SUBCUTANEOUS at 16:32

## 2019-02-02 RX ADMIN — HYDROCHLOROTHIAZIDE 25 MG: 25 TABLET ORAL at 08:52

## 2019-02-02 RX ADMIN — ARFORMOTEROL TARTRATE 15 MCG: 15 SOLUTION RESPIRATORY (INHALATION) at 08:42

## 2019-02-02 RX ADMIN — LOSARTAN POTASSIUM 100 MG: 50 TABLET ORAL at 08:51

## 2019-02-02 RX ADMIN — BUDESONIDE 500 MCG: 0.5 INHALANT RESPIRATORY (INHALATION) at 08:42

## 2019-02-02 NOTE — PROGRESS NOTES
Problem: Falls - Risk of 
Goal: *Absence of Falls Document F[facility] Moy Fall Risk and appropriate interventions in the flowsheet. Outcome: Progressing Towards Goal 
Fall Risk Interventions: 
Mobility Interventions: Bed/chair exit alarm, Patient to call before getting OOB, PT Consult for mobility concerns Medication Interventions: Patient to call before getting OOB Elimination Interventions: Elevated toilet seat, Toilet paper/wipes in reach

## 2019-02-02 NOTE — PROGRESS NOTES
Beside and verbal  Shift report given to Ananya RN(oncoming nurse) by Norman Tony RN, (off going nurse). Report include the following information, SBAR, KARDEX, MAR, Recent results, and intake and output

## 2019-02-02 NOTE — PROGRESS NOTES
Internal Medicine Progress Note Patient's Name: Annelle Lundborg Admit Date: 1/29/2019 Length of Stay: 4 Assessment/Plan Principal Problem: 
  Surgical wound dehiscence (1/29/2019) Active Problems: Hypertension (12/26/2018) Peripheral arterial disease ([facility] [address] (12/26/2018) COPD (chronic obstructive pulmonary disease) ([facility] [address] (12/26/2018) Insomnia (12/26/2018) Surgical would dehiscence - IV antibiotics - vanc, zosyn 
- wound cx 1/29: 4 different GNR 
- Per Vascular Surgery to OR 1/30 wash out  
- Vascular AKA on 2/1 
- Pain control PRN 
-Pt/OT 
 
COPD 
- Cont broangella pulmicort HTN 
- BP control - cont home meds Insomnia - temazepam qhs PRN 
 
 
- Cont acceptable home medications for chronic conditions  
- DVT protocol 
- discharge: TBD Interval History Annelle Lundborg is a 74 y.o. male who has been seen for medical management. He states he was admitted for amputation. He was directly admitted from Dr. Toby Keene office for planned surgery tomorrow due to surgical wound dehisence (right leg wound washout with possible wound vac placement). He was recently admitted 12/26-1/4 where he underwent atherectomy and stent placement of R pop artery occlusion. He was started on IV vancomycin and zosyn. Scheduled brovana, pulmicort for COPD. Wound cx on 1/29 with 4 different GNR. To OR 1/30 - wound washout with debridement, wound vac placed per VS. Repeat wound cx done 1/30 - still with multiple GNR. RLE AKA done 2/1, continue abx, wound care and pt per vascular. Dispo planning Subjective Complains of some surgical pain, NAD Objective Visit Vitals /62 Pulse 83 Temp 99 °F (37.2 °C) Resp 18 Ht 5' 7\" (1.702 m) Wt 57.2 kg (126 lb) SpO2 93% BMI 19.73 kg/m² Physical Exam: 
General Appearance: NAD, conversant HENT: normocephalic/atraumatic, moist mucus membranes Neck: No JVD, supple Lungs: CTA with normal respiratory effort CV: RRR, no m/r/g Abdomen: soft, non-tender, normal bowel sounds Extremities: no cyanosis, no peripheral edema, RAKA Neuro: No focal deficits, motor/sensory intact Skin: Normal color, intact Intake and Output: 
Current Shift:  No intake/output data recorded. Last three shifts:  01/31 1901 - 02/02 0700 In: 740 [P.O.:240; I.V.:500] Out: 2175 [HXBPQ:0140] Lab/Data Reviewed: 
BMP:  
Lab Results Component Value Date/Time  02/02/2019 04:00 AM  
 K 4.5 02/02/2019 04:00 AM  
  02/02/2019 04:00 AM  
 CO2 28 02/02/2019 04:00 AM  
 AGAP 8 02/02/2019 04:00 AM  
  (H) 02/02/2019 04:00 AM  
 BUN 18 02/02/2019 04:00 AM  
 CREA 0.71 02/02/2019 04:00 AM  
 GFRAA >60 02/02/2019 04:00 AM  
 GFRNA >60 02/02/2019 04:00 AM  
 
CBC:  
No results found for: WBC, HGB, HGBEXT, HCT, HCTEXT, PLT, PLTEXT, HGBEXT, HCTEXT, PLTEXT Imaging Reviewed: 
No results found. Medications Reviewed: 
Current Facility-Administered Medications Medication Dose Route Frequency  labetalol (NORMODYNE;TRANDATE) 20 mg/4 mL (5 mg/mL) injection 5 mg  5 mg IntraVENous PRN  piperacillin-tazobactam (ZOSYN) 3.375 g in 0.9% sodium chloride (MBP/ADV) 100 mL MBP \"EXTENDED 4 HOUR INFUSION###\"   3.375 g IntraVENous Q8H  
 VANCOMYCIN INFORMATION NOTE 1 Each  1 Each Other Rx Dosing/Monitoring  vancomycin (VANCOCIN) 750 mg in 0.9% sodium chloride (MBP/ADV) 250 mL ADV  750 mg IntraVENous Q12H  aspirin chewable tablet 81 mg  81 mg Oral DAILY  temazepam (RESTORIL) capsule 30 mg  30 mg Oral QHS PRN  
 arformoterol (BROVANA) neb solution 15 mcg  15 mcg Nebulization BID RT  And  
 budesonide (PULMICORT) 500 mcg/2 ml nebulizer suspension  500 mcg Nebulization BID RT  
 acetaminophen (TYLENOL) tablet 650 mg  650 mg Oral Q4H PRN  
 HYDROcodone-acetaminophen (NORCO) 5-325 mg per tablet 1 Tab  1 Tab Oral Q6H PRN  
  oxyCODONE-acetaminophen (PERCOCET) 5-325 mg per tablet 1 Tab  1 Tab Oral Q6H PRN  
 losartan (COZAAR) tablet 100 mg  100 mg Oral DAILY  hydroCHLOROthiazide (HYDRODIURIL) tablet 25 mg  25 mg Oral DAILY

## 2019-02-02 NOTE — ROUTINE PROCESS
Bedside and Verbal shift change report given to Hans Renteria (oncoming nurse) by nenita berrios rn (offgoing nurse). Report given with SBAR, Kardex, Intake/Output and Recent Results.

## 2019-02-02 NOTE — PROGRESS NOTES
[facility] 
[address] [facility], [address] Rosangela Hameed,  Dr. Letha Lima [phone] FAX# [phone] PROGRESS NOTE Patient: Sohan Reed MRN: [id number]  SSN: [id number] YOB: 1945  Age: 74 y.o. Sex: male Date: 2/2/2019 Hospital: [facility] Post-Op Day: 1 Plan:  
increase activity and out of bed Pain control Physical therapy Hopefully D/c to rehab Monday. Assessment:  
good progress and wounds fine Subjective: Moderate pain Not required Objective:  
Admit weight: Weight: 57.2 kg (126 lb) Last recorded weight: Weight: 57.2 kg (126 lb) Visit Vitals /62 Pulse 83 Temp 99 °F (37.2 °C) Resp 18 Ht 5' 7\" (1.702 m) Wt 57.2 kg (126 lb) SpO2 93% BMI 19.73 kg/m² Intake/Output Summary (Last 24 hours) at 2/2/2019 1035 Last data filed at 2/2/2019 0410 Gross per 24 hour Intake 620 ml Output 975 ml Net -355 ml Physical exam was negative except for: Right leg bandage clean and dry. Labs:  
 
Recent Labs  
  01/31/19 
8469 WBC 12.0 HGB 10.9* HCT 34.8*  
 RDW 13.6 Recent Labs 02/02/19 
0400 02/01/19 
0510 01/31/19 
2929  138 139  
K 4.5 4.1 4.7  100 103 CO2 28 31 30 * 94 105* BUN 18 19* 23* CREA 0.71 0.64 0.66  
CA 7.9* 9.1 8.9 INR  --  1.1  -- No results for input(s): PH, PCO2, PO2, HCO3, FIO2 in the last 72 hours.  
 
Shawna Linares MD, FACS

## 2019-02-02 NOTE — PROGRESS NOTES
Received drowsy,aroused easily. Ace dressing to right stump,in tact. Call light,phone,urinal in reach. Left heel off bed with pillow. 2143 Restoril 30 mg po for sleep per pt request. 
2240 Pt drowsy,breathing regular. 2/2/2019 0220 O2 sats 86% on room air. Nasal O2 at 2lpm.Sats up to 94% 0400 Pt spilled urine on bed when using urinal.Raquel care done,bedsheet ,underpad changed. Mepilex to sacrum changed. Noted small amt of sanguinous drainage on posterior above the knee ace wrap. Reinforced with one ABD pad & ace dressing. Tolerated procedure well. Pt using ICS as instructed. Able to reach 1000 ml volume.

## 2019-02-02 NOTE — PROGRESS NOTES
Problem: Falls - Risk of 
Goal: *Absence of Falls Document Kaelyn Charlton Fall Risk and appropriate interventions in the flowsheet. Outcome: Progressing Towards Goal 
Fall Risk Interventions: 
Mobility Interventions: Patient to call before getting OOB Medication Interventions: Patient to call before getting OOB Elimination Interventions: Call light in reach, Urinal in reach Problem: Pressure Injury - Risk of 
Goal: *Prevention of pressure injury Document Gee Scale and appropriate interventions in the flowsheet. Outcome: Progressing Towards Goal 
Pressure Injury Interventions: 
Sensory Interventions: Keep linens dry and wrinkle-free Moisture Interventions: Absorbent underpads Activity Interventions: Pressure redistribution bed/mattress(bed type) Mobility Interventions: HOB 30 degrees or less Nutrition Interventions: Document food/fluid/supplement intake Friction and Shear Interventions: HOB 30 degrees or less

## 2019-02-02 NOTE — PROGRESS NOTES
Problem: Mobility Impaired (Adult and Pediatric) Goal: *Acute Goals and Plan of Care (Insert Text) Physical Therapy Goals Initiated 2/2/2019 and to be accomplished within 7 day(s) 1. Patient will move from supine to sit and sit to supine in bed with supervision/set-up in order to facilitate eating meals in sitting. 2.  Patient will transfer from bed to chair and chair to bed with minimal assistance/contact guard assist using slide board in order to facilitate participation in sitting ADLs. 3.  Patient will perform sit to stand with minimal assistance/contact guard assist in order to prepare for standing ADLs. 4.  Patient will ambulate with minimal assistance/contact guard assist for 20 feet with the least restrictive device in order to facilitate improved ease with ambulation to the restroom. Outcome: Progressing Towards Goal 
PHYSICAL THERAPY: Initial Assessment INPATIENT: Medicare: Hospital Day: 5 Patient: Marquita Contreras (69 y.o. male)    Date: 2/2/2019 Primary Diagnosis: Wound infection Surgical wound dehiscence Procedure(s) (LRB): 
right above knee amputation (Right), 1 Day Post-Op Precautions:  Fall and skin PLOF: Mod I ambulation using RW, motorized scooter in community ASSESSMENT : 
Patient is a 74 y.o. male who presents to Physical Therapy with diagnosis of Wound infection Surgical wound dehiscence. Patient is supine and agrees to participate in PT services. Pt is alert and oriented x 4. Upon objective evaluation, patient demonstrated full gross L LE AROM, impaired GIO LE strength in hip flexion, and decreased standing static balance. Patient participated in ~10 minutes of sitting static and dynamic balance training during L LE MMT, donning gown, and [facility] [address] [facility]. Pt required CGA with dynamic sitting balance and SBA with static sitting balance.  Participation in 2 sets of sit to stand transfers with mod A x2 for first attempt and min A x2 for last attempt. Required VC for use of GIO UEs press up from bed vs pull up from RW for safety and improved stability. Patient ambulated 4 short steps on L LE with RW and required moderate A x2 (with son) for safety. Patient requires between moderate assistance x2 and minimal assistance/contact guard assist for bed mobility, transfers and ambulation. Recommended regular performance of GIO ankle pumps for safety with prolonged inactivity. Issued HEP of sitting (with supervision of family) GIO LE march and L LE LAQ, and supine L hip ABD, SLR, and QS. Patient can benefit from skilled PT interventions to decrease r/o falls and improve GIO LE strength, increase walking/standing tolerance, and improve body mechanics mechanics with bed mobility and transfers to facilitate ADL's & overall functional status/quality of life. Patient presents with deficits in: Bed Mobility, Transfers, Gait, Strength, Range of Motion, Balance, Home Exercise Program, Stairs and Precautions Patient will benefit from skilled intervention to address the above impairments. Patients rehabilitation potential is considered to be Good Factors which may influence rehabilitation potential include:  
[]         None noted 
[]         Mental ability/status [x]         Medical condition 
[]         Home/family situation and support systems 
[]         Safety awareness [x]         Pain tolerance/management 
[]         Other: PLAN : 
Recommendations and Planned Interventions: 
[x]           Bed Mobility Training             [x]    Neuromuscular Re-Education 
[x]           Transfer Training                   []    Orthotic/Prosthetic Training 
[x]           Gait Training                          []    Modalities [x]           Therapeutic Exercises          []    Edema Management/Control 
[x]           Therapeutic Activities            [x]    Patient and Family Training/Education 
[]           Other (comment): 
 EDUCATION:  
Education:  Patient was educated on the following topics: Safety and proper mechanics with bed mobility, transfers, ADLs, balance, amb, exercise, role of PT, plan of care, cognition, skin integrity, vitals     
Barriers to Learning/Limitations: none Compensate with: visual, verbal, tactile, kinesthetic cues/model Recommendations for the next treatment session: Gait training, bed mobility, transfers, functional mobility, therex, balance training, and stair navigation Frequency/Duration: Patient will be followed by physical therapy 1-2 times per day/4-7 days per week to address goals. Discharge Recommendations: Rehab Further Equipment Recommendations for Discharge: Slide board; rolling walker (Has) Factors which may impact discharge planning: Medical condition and pain level SUBJECTIVE:  
Patient stated Zaria Crater is only in the leg.  OBJECTIVE DATA SUMMARY:  
 
Past Medical History:  
Diagnosis Date  High cholesterol  Hypertension  PAD (peripheral artery disease) ([facility] [address] Past Surgical History:  
Procedure Laterality Date  HX APPENDECTOMY  HX MOHS PROCEDURES Left  HX TONSIL AND ADENOIDECTOMY Eval Complexity: History: MEDIUM  Complexity : 1-2 comorbidities / personal factors will impact the outcome/ POC Exam:HIGH Complexity : 4+ Standardized tests and measures addressing body structure, function, activity limitation and / or participation in recreation  Presentation: MEDIUM Complexity : Evolving with changing characteristics  Clinical Decision Making: Medium Complexity (Clinical Judgement) Overall Complexity:MEDIUM 
 
G CODES:Mobility  Current  CM= 80-99%   Goal  CL= 60-79%. The severity rating is based on the Other [facility] [place] Balance Scale [address] Standing Balance Scale = 1/5 
0: Pt performs 25% or less of standing activity (Max assist) CN, 100% impaired.  
1: Pt supports self with upper extremities but requires therapist assistance. Pt performs 25-50% of effort (Mod assist) CM, 80% to <100% impaired. 1+: Pt supports self with upper extremities but requires therapist assistance. Pt performs >50% effort. (Min assist). CL, 60% to <80% impaired. 2: Pt supports self independently with both upper extremities (walker, crutches, parallel bars). CL, 60% to <80% impaired. 2+: Pt support self independently with 1 upper extremity (cane, crutch, 1 parallel bar). CK, 40% to <60% impaired. 3: Pt stands without upper extremity support for up to 30 seconds. CK, 40% to <60% impaired. 3+: Pt stands without upper extremity support for 30 seconds or greater. CJ, 20% to <40% impaired. 4: Pt independently moves and returns center of gravity 1-2 inches in one plane. CJ, 20% to <40% impaired. 4+: Pt independently moves and returns center of gravity 1-2 inches in multiple planes. CI, 1% to <20% impaired. 5: Pt independently moves and returns center of gravity in all planes greater than 2 inches. CH, 0% impaired. Prior Level of Function/Home Situation: see above Home Situation Home Environment: Private residence # Steps to Enter: 1 Rails to Enter: No 
One/Two Story Residence: Two story # of Interior Steps: 8 Interior Rails: (yes) Living Alone: No 
Support Systems: Family member(s), Spouse/Significant Other/Partner Patient Expects to be Discharged to[de-identified] Skilled nursing facility Current DME Used/Available at Home: Amedeo Grave, rollingCritical Behavior: 
Neurologic State: Alert; Appropriate for age Orientation Level: Oriented X4 Cognition: Follows commands Safety/Judgement: Fall prevention Psychosocial 
Patient Behaviors: Calm; Cooperative Family  Behaviors: Calm; Cooperative Visitor Behaviors: Calm; Cooperative Needs Expressed: Educational 
Purposeful Interaction: Yes Family  Behaviors: Calm; Cooperative Manual Muscle Testing (LE) 
       R     L Hip Flexion:   2+/5  3/5 Knee EXT:   N/A  4+/5 Knee FLEX:   N/A  5-/5 Ankle DF:   N/A  4+/5 
_________________________________________________ Tone : No hypertonicity or hypotonicity notedSensation: GIO L2-S2 light touch LE WNLs Range Of Motion:  L LE AROM WNLs Functional Mobility: 
 
 
Functional Status Indep (I) Mod I Super-vision Min A Mod A Max A Total A Assist x2 Verbal cues Additional time Not tested Comments Rolling []  []  [] []    []    []  []  [] [] [] [x] Supine to sit []  []  [] [x]  []  []  []  [] [] [] [] Sit to supine []  []  [] [x]  []  []  []  [] [] [] [] Sit to stand []  []  [] []  [x]  []  []  [x] [] [] []   
Stand to sit []  []  [] [x]  []  []  []  [x] [] [] [] Bed to chair transfers []  []  [] []  []  []  []  [] [] [] [x] Balance Good Willadean Eric Poor Unable Not tested Comments Sitting static []  [x]  []  []  [] Sitting dynamic []  [x]  []  []  []   
Standing static []  []  [x]  []  []   
Standing dynamic []  []  []  []  [x] Mobility/Gait:  
Level of Assistance: Moderate Assistance x2 Assistive Device: rolling walker Distance Ambulated: 4 L LE steps Left Lower Extremity: FWB Right Lower Extremity: NWB (R LE AKA) Base of Support: shift to left Speed/Nadia: pace decreased (<100 feet/min) Step Length: left shortened Swing Pattern: left asymmetrical 
Stance: left increased Gait Abnormalities: fall prevention and step to gait Therapeutic Exercises:  
 
EXERCISE Sets Reps Active Active Assist  
Passive Self ROM Comments Ankle Pumps 1 10  [x] [] [] [] L LE  
Quad Sets/Glut Sets 1 10 [x] [] [] [] L LE Hamstring Sets   [] [] [] [] Short Arc Quads   [] [] [] [] Heel Slides   [] [] [] [] Straight Leg Raises   [] [] [] [] Hip Abd/Add   [] [] [] [] Long Arc Quads 1 10 [x] [] [] [] L LE 5\" Hold Seated Marching 1 10 [x] [] [] [] GIO LEs Standing Marching   [] [] [] []   
   [] [] [] []   
 
 
Pain: 
Pre treatment pain level: 8/10 Post treatment pain level: 8/10 Pain Scale 1: Numeric (0 - 10) Pain Intensity 1: 6 Pain Location 1: Knee Pain Orientation 1: Right Pain Description 1: Throbbing Pain Intervention(s) 1: Medication (see MAR) Vital Signs Temp: 99 °F (37.2 °C) Pulse (Heart Rate): 83    
BP: 111/62 Resp Rate: 18    
O2 Sat (%): 93 % Activity Tolerance: Fair Please refer to the flowsheet for vital signs taken during this treatment. After treatment:  
[]         Patient left in no apparent distress sitting up in chair 
[x]         Patient left in no apparent distress in bed 
[x]         Call bell left within reach [x]         Nursing notified - Ever Mendoza 
[]         Caregiver present 
[]         Bed alarm activated COMMUNICATION/EDUCATION:  
[x]         Fall prevention education was provided and the patient/caregiver indicated understanding. [x]         Patient/family have participated as able in goal setting and plan of care. [x]         Patient/family agree to work toward stated goals and plan of care. [x]         Patient understands intent and goals of therapy, but is neutral about his/her participation. []         Patient is unable to participate in goal setting and plan of care. Thank you for this referral. 
Bisi Padilla Time Calculation: 42 mins

## 2019-02-02 NOTE — PROGRESS NOTES
Problem: Pressure Injury - Risk of 
Goal: *Prevention of pressure injury Document Gee Scale and appropriate interventions in the flowsheet. Outcome: Progressing Towards Goal 
Pressure Injury Interventions: 
Sensory Interventions: Check visual cues for pain, Maintain/enhance activity level Moisture Interventions: Assess need for specialty bed, Internal/External fecal devices, Maintain skin hydration (lotion/cream) Activity Interventions: Chair cushion, Pressure redistribution bed/mattress(bed type) Mobility Interventions: HOB 30 degrees or less, PT/OT evaluation Nutrition Interventions: Document food/fluid/supplement intake, Discuss nutritional consult with provider, Offer support with meals,snacks and hydration Friction and Shear Interventions: Feet elevated on foot rest, Minimize layers, Sit at 90-degree angle

## 2019-02-02 NOTE — PROGRESS NOTES
Pharmacy Dosing Services: Vancomycin Indication: skin and soft tissue infection/wound dehiscence Day of therapy: 5 Other Antimicrobials (Include dose, start day & day of therapy): 
Zosyn 3.375 g iv EI q8h (day 1: ) Loading dose (date given): 1500 mg 
Current Maintenance dose: 750 mg IV every 12 hours Goal Vancomycin Level: 15-20 
(Trough 15-20 for most infections, 20 for meningitis/osteomyelitis, pre-HD level ~25) Vancomycin Level (if drawn):  
: 12 (12 hours post-dose - early)   : 10.8 (12 hours post-dose ) Significant Cultures:  
 Wound - GPC, GNR (4 morphotypes)  Surgical - GNR Renal function stable? (unstable defined as SCr increase of 0.5 mg/dL or > 50% increase from baseline, whichever is greater) (Y/N): Y  
 
CAPD, Hemodialysis or Renal Replacement Therapy (Y/N): N Recent Labs 19 
0400 19 
0510 19 
9288 CREA 0.71 0.64 0.66 BUN 18 19* 23* WBC  --   --  12.0 Temp (24hrs), Av.5 °F (36.9 °C), Min:98.1 °F (36.7 °C), Max:99 °F (37.2 °C) Creatinine Clearance (Creatinine Clearance (ml/min)):  72.7 ml/min Regimen assessment: Pt was sub-therapeutic. Maintenance dose: 1000 mg iv q12h Next scheduled level:   at 0730 Pharmacy will follow daily and adjust medications as appropriate for renal function and/or serum levels. Thank you, Maria Elena Clifton

## 2019-02-02 NOTE — PROGRESS NOTES
Problem: Falls - Risk of 
Goal: *Absence of Falls Document [place] Officer Fall Risk and appropriate interventions in the flowsheet. Outcome: Progressing Towards Goal 
Fall Risk Interventions: 
Mobility Interventions: Patient to call before getting OOB Medication Interventions: Patient to call before getting OOB Elimination Interventions: Call light in reach, Urinal in reach Problem: Pressure Injury - Risk of 
Goal: *Prevention of pressure injury Document Gee Scale and appropriate interventions in the flowsheet. Pressure Injury Interventions: 
Sensory Interventions: Keep linens dry and wrinkle-free Moisture Interventions: Absorbent underpads Activity Interventions: Pressure redistribution bed/mattress(bed type) Mobility Interventions: HOB 30 degrees or less Nutrition Interventions: Document food/fluid/supplement intake Friction and Shear Interventions: HOB 30 degrees or less

## 2019-02-03 LAB
ANION GAP SERPL CALC-SCNC: 8 MMOL/L (ref 3–18)
BACTERIA SPEC CULT: ABNORMAL
BUN SERPL-MCNC: 21 MG/DL (ref 7–18)
BUN/CREAT SERPL: 38 (ref 12–20)
CALCIUM SERPL-MCNC: 8.2 MG/DL (ref 8.5–10.1)
CHLORIDE SERPL-SCNC: 101 MMOL/L (ref 100–108)
CO2 SERPL-SCNC: 29 MMOL/L (ref 21–32)
CREAT SERPL-MCNC: 0.56 MG/DL (ref 0.6–1.3)
GLUCOSE SERPL-MCNC: 127 MG/DL (ref 74–99)
GRAM STN SPEC: ABNORMAL
GRAM STN SPEC: ABNORMAL
POTASSIUM SERPL-SCNC: 4.3 MMOL/L (ref 3.5–5.5)
SERVICE CMNT-IMP: ABNORMAL
SODIUM SERPL-SCNC: 138 MMOL/L (ref 136–145)

## 2019-02-03 PROCEDURE — 36415 COLL VENOUS BLD VENIPUNCTURE: CPT

## 2019-02-03 PROCEDURE — 97116 GAIT TRAINING THERAPY: CPT

## 2019-02-03 PROCEDURE — 74011000258 HC RX REV CODE- 258: Performed by: SURGERY

## 2019-02-03 PROCEDURE — 74011250636 HC RX REV CODE- 250/636: Performed by: HOSPITALIST

## 2019-02-03 PROCEDURE — 97530 THERAPEUTIC ACTIVITIES: CPT

## 2019-02-03 PROCEDURE — 94760 N-INVAS EAR/PLS OXIMETRY 1: CPT

## 2019-02-03 PROCEDURE — 74011250637 HC RX REV CODE- 250/637: Performed by: HOSPITALIST

## 2019-02-03 PROCEDURE — 3331090001 HH PPS REVENUE CREDIT

## 2019-02-03 PROCEDURE — 74011000250 HC RX REV CODE- 250: Performed by: HOSPITALIST

## 2019-02-03 PROCEDURE — 74011250636 HC RX REV CODE- 250/636: Performed by: SURGERY

## 2019-02-03 PROCEDURE — 80048 BASIC METABOLIC PNL TOTAL CA: CPT

## 2019-02-03 PROCEDURE — 74011250637 HC RX REV CODE- 250/637: Performed by: SURGERY

## 2019-02-03 PROCEDURE — 94640 AIRWAY INHALATION TREATMENT: CPT

## 2019-02-03 PROCEDURE — 3331090002 HH PPS REVENUE DEBIT

## 2019-02-03 PROCEDURE — 65270000029 HC RM PRIVATE

## 2019-02-03 RX ADMIN — HYDROMORPHONE HYDROCHLORIDE 1 MG: 1 INJECTION, SOLUTION INTRAMUSCULAR; INTRAVENOUS; SUBCUTANEOUS at 08:43

## 2019-02-03 RX ADMIN — SODIUM CHLORIDE 1000 MG: 900 INJECTION, SOLUTION INTRAVENOUS at 08:43

## 2019-02-03 RX ADMIN — ARFORMOTEROL TARTRATE 15 MCG: 15 SOLUTION RESPIRATORY (INHALATION) at 07:38

## 2019-02-03 RX ADMIN — ASPIRIN 81 MG 81 MG: 81 TABLET ORAL at 09:19

## 2019-02-03 RX ADMIN — HYDROCHLOROTHIAZIDE 25 MG: 25 TABLET ORAL at 09:19

## 2019-02-03 RX ADMIN — PIPERACILLIN SODIUM,TAZOBACTAM SODIUM 3.38 G: 3; .375 INJECTION, POWDER, FOR SOLUTION INTRAVENOUS at 00:14

## 2019-02-03 RX ADMIN — BUDESONIDE 500 MCG: 0.5 INHALANT RESPIRATORY (INHALATION) at 21:18

## 2019-02-03 RX ADMIN — SODIUM CHLORIDE 1000 MG: 900 INJECTION, SOLUTION INTRAVENOUS at 21:00

## 2019-02-03 RX ADMIN — ARFORMOTEROL TARTRATE 15 MCG: 15 SOLUTION RESPIRATORY (INHALATION) at 21:18

## 2019-02-03 RX ADMIN — PIPERACILLIN SODIUM,TAZOBACTAM SODIUM 3.38 G: 3; .375 INJECTION, POWDER, FOR SOLUTION INTRAVENOUS at 06:48

## 2019-02-03 RX ADMIN — OXYCODONE AND ACETAMINOPHEN 1 TABLET: 5; 325 TABLET ORAL at 12:13

## 2019-02-03 RX ADMIN — OXYCODONE AND ACETAMINOPHEN 1 TABLET: 5; 325 TABLET ORAL at 04:13

## 2019-02-03 RX ADMIN — TEMAZEPAM 30 MG: 15 CAPSULE ORAL at 22:21

## 2019-02-03 RX ADMIN — LOSARTAN POTASSIUM 100 MG: 50 TABLET ORAL at 09:18

## 2019-02-03 RX ADMIN — BUDESONIDE 500 MCG: 0.5 INHALANT RESPIRATORY (INHALATION) at 07:38

## 2019-02-03 RX ADMIN — PIPERACILLIN SODIUM,TAZOBACTAM SODIUM 3.38 G: 3; .375 INJECTION, POWDER, FOR SOLUTION INTRAVENOUS at 18:39

## 2019-02-03 RX ADMIN — PIPERACILLIN SODIUM,TAZOBACTAM SODIUM 3.38 G: 3; .375 INJECTION, POWDER, FOR SOLUTION INTRAVENOUS at 12:13

## 2019-02-03 RX ADMIN — OXYCODONE AND ACETAMINOPHEN 1 TABLET: 5; 325 TABLET ORAL at 18:43

## 2019-02-03 NOTE — PROGRESS NOTES
Assumed patient care. Received report from [facility] (offgoing nurse). Report included SBAR, Kardex, and MAR. Patient resting in bed, in no signs of pain or distress. Call light and possessions within reach. Bed in lowest setting. 2119: Pain medication given 2145: Patient 86% on room air; placed on 4L advanced to 90%; clear bilaterally; resp 16; denies SOB  
 
2145: Informed dr. Spencer Earl of above; no orders received 
 
2228: sleeping medication given

## 2019-02-03 NOTE — PROGRESS NOTES
Internal Medicine Progress Note Patient's Name: Nini De Anda Admit Date: 1/29/2019 Length of Stay: 5 Assessment/Plan Principal Problem: 
  Surgical wound dehiscence (1/29/2019) Active Problems: Hypertension (12/26/2018) Peripheral arterial disease ([facility] [address] (12/26/2018) COPD (chronic obstructive pulmonary disease) ([facility] [address] (12/26/2018) Insomnia (12/26/2018) S/P AKA (above knee amputation) unilateral ([facility] [address] (2/2/2019) Surgical would dehiscence - IV antibiotics - vanc, zosyn 
- wound cx 1/29: 4 different GNR 
- Per Vascular Surgery to OR 1/30 wash out  
- Vascular AKA on 2/1 
- Pain control PRN 
-Pt/OT 
 
COPD 
- Cont brovana, pulmicort HTN 
- BP control - cont home meds Insomnia - temazepam qhs PRN 
 
 
- Cont acceptable home medications for chronic conditions  
- DVT protocol 
- discharge: tomorrow SNF Interval History Nini De Anda is a 74 y.o. male who has been seen for medical management. He states he was admitted for amputation. He was directly admitted from Dr. Cool Duty office for planned surgery tomorrow due to surgical wound dehisence (right leg wound washout with possible wound vac placement). He was recently admitted 12/26-1/4 where he underwent atherectomy and stent placement of R pop artery occlusion. He was started on IV vancomycin and zosyn. Scheduled brovana, pulmicort for COPD. Wound cx on 1/29 with 4 different GNR. To OR 1/30 - wound washout with debridement, wound vac placed per VS. Repeat wound cx done 1/30 - still with multiple GNR. RLE AKA done 2/1, continue abx, wound care and pt per vascular. Dispo planning Subjective NAD AAOx3 Objective Visit Vitals /51 Pulse 82 Temp 98.5 °F (36.9 °C) Resp 18 Ht 5' 7\" (1.702 m) Wt 57.2 kg (126 lb) SpO2 (!) 89% BMI 19.73 kg/m² Physical Exam: 
General Appearance: NAD, conversant HENT: normocephalic/atraumatic, moist mucus membranes Neck: No JVD, supple Lungs: CTA with normal respiratory effort CV: RRR, no m/r/g Abdomen: soft, non-tender, normal bowel sounds Extremities: no cyanosis, no peripheral edema, RAKA Neuro: No focal deficits, motor/sensory intact Skin: Normal color, intact Intake and Output: 
Current Shift:  02/03 0701 - 02/03 1900 In: 480 [P.O.:480] Out: 800 [Urine:800] Last three shifts:  02/01 1901 - 02/03 0700 In: 600 [P.O.:600] Out: 1625 [[id number]] Lab/Data Reviewed: 
BMP:  
Lab Results Component Value Date/Time  02/03/2019 05:01 AM  
 K 4.3 02/03/2019 05:01 AM  
  02/03/2019 05:01 AM  
 CO2 29 02/03/2019 05:01 AM  
 AGAP 8 02/03/2019 05:01 AM  
  (H) 02/03/2019 05:01 AM  
 BUN 21 (H) 02/03/2019 05:01 AM  
 CREA 0.56 (L) 02/03/2019 05:01 AM  
 GFRAA >60 02/03/2019 05:01 AM  
 GFRNA >60 02/03/2019 05:01 AM  
 
CBC:  
No results found for: WBC, HGB, HGBEXT, HCT, HCTEXT, PLT, PLTEXT, HGBEXT, HCTEXT, PLTEXT Imaging Reviewed: 
No results found. Medications Reviewed: 
Current Facility-Administered Medications Medication Dose Route Frequency  HYDROmorphone (PF) (DILAUDID) injection 1 mg  1 mg IntraVENous Q3H PRN  
 vancomycin (VANCOCIN) 1,000 mg in 0.9% sodium chloride (MBP/ADV) 250 mL adv  1,000 mg IntraVENous Q12H  
 [START ON 2/4/2019] VANCOMYCIN INFORMATION NOTE   Other ONCE  piperacillin-tazobactam (ZOSYN) 3.375 g in 0.9% sodium chloride (MBP/ADV) 100 mL MBP  3.375 g IntraVENous Q6H  
 labetalol (NORMODYNE;TRANDATE) 20 mg/4 mL (5 mg/mL) injection 5 mg  5 mg IntraVENous PRN  
 VANCOMYCIN INFORMATION NOTE 1 Each  1 Each Other Rx Dosing/Monitoring  aspirin chewable tablet 81 mg  81 mg Oral DAILY  temazepam (RESTORIL) capsule 30 mg  30 mg Oral QHS PRN  
 arformoterol (BROVANA) neb solution 15 mcg  15 mcg Nebulization BID RT  And  
 budesonide (PULMICORT) 500 mcg/2 ml nebulizer suspension  500 mcg Nebulization BID RT  
  acetaminophen (TYLENOL) tablet 650 mg  650 mg Oral Q4H PRN  
 HYDROcodone-acetaminophen (NORCO) 5-325 mg per tablet 1 Tab  1 Tab Oral Q6H PRN  
 oxyCODONE-acetaminophen (PERCOCET) 5-325 mg per tablet 1 Tab  1 Tab Oral Q6H PRN  
 losartan (COZAAR) tablet 100 mg  100 mg Oral DAILY  hydroCHLOROthiazide (HYDRODIURIL) tablet 25 mg  25 mg Oral DAILY

## 2019-02-03 NOTE — PROGRESS NOTES
Problem: Falls - Risk of 
Goal: *Absence of Falls Document [place] Quick Fall Risk and appropriate interventions in the flowsheet. Outcome: Progressing Towards Goal 
Fall Risk Interventions: 
Mobility Interventions: PT Consult for mobility concerns Medication Interventions: Evaluate medications/consider consulting pharmacy Elimination Interventions: Call light in reach Problem: Pressure Injury - Risk of 
Goal: *Prevention of pressure injury Document Gee Scale and appropriate interventions in the flowsheet. Outcome: Progressing Towards Goal 
Pressure Injury Interventions: 
Sensory Interventions: Assess changes in LOC Moisture Interventions: Absorbent underpads Activity Interventions: Pressure redistribution bed/mattress(bed type) Mobility Interventions: HOB 30 degrees or less Nutrition Interventions: Document food/fluid/supplement intake Friction and Shear Interventions: HOB 30 degrees or less

## 2019-02-03 NOTE — PROGRESS NOTES
0720: Bedside and Verbal shift change report given to Khushboo Broussard RN (oncoming nurse) by Keely Barry RN (offgoing nurse). Report included the following information SBAR, Kardex and MAR.

## 2019-02-03 NOTE — PROGRESS NOTES
Problem: Mobility Impaired (Adult and Pediatric) Goal: *Acute Goals and Plan of Care (Insert Text) Physical Therapy Goals Initiated 2/2/2019 and to be accomplished within 7 day(s) 1. Patient will move from supine to sit and sit to supine in bed with supervision/set-up in order to facilitate eating meals in sitting. 2.  Patient will transfer from bed to chair and chair to bed with minimal assistance/contact guard assist using slide board in order to facilitate participation in sitting ADLs. 3.  Patient will perform sit to stand with minimal assistance/contact guard assist in order to prepare for standing ADLs. 4.  Patient will ambulate with minimal assistance/contact guard assist for 20 feet with the least restrictive device in order to facilitate improved ease with ambulation to the restroom. Outcome: Progressing Towards Goal 
PHYSICAL THERAPY: Daily TREATMENT Note INPATIENT: Medicare: Hospital Day: 6 Patient: Saniya Zhao (37 y.o. male)    Date: 2/3/2019 Primary Diagnosis: Wound infection Surgical wound dehiscence Procedure(s) (LRB): 
right above knee amputation (Right), 2 Days Post-Op, Precautions:   
 
Chart, physical therapy assessment, plan of care and goals were reviewed. PLOF:independent, took care of wife ASSESSMENT: 
Nurse informed me prior to treatment pt refusing O2 at this time  despite recommended by nurse and MD.Pt with progress today; however, max encouragement to participate with PT. Initially alone in room, and then daughter and son came in. SOn eager to help. Supervision for bed mobility. Pt able to perform all LE TE without difficulty. Sitting up on EOB (I) and(I) for scooting. min A> CGA for sit<>std. Pt ambulated 4' with RW with CGA, pt's bandage came off, note wound was bleeding lightly. Nursejane informed and came into change/clean/bandage wound. Out of room for 25 mins. Returned and continues rx.  Pt agreed to walk again with max encouragement. CGA for 4' of amb with RW. Pt requesting to use BSC. CGA for SPT, pt unable to have a BM at this time. CGA to return to bed. Pt request to sit on EOB for dinner and for O2 at this time due to SOB. Pt ed on importance of 02 levels; pt verbalized understanding. note daughter in room at this time with call bell in reach, nurse informed. Progression toward goals: 
      Improving appropriately and progressing toward goals(X) Improving slowly and progressing toward goals Not making progress toward goals and plan of care will be adjusted PLAN: 
Patient continues to benefit from skilled intervention to address the above impairments. Continue treatment per established plan of care. EDUCATION:  
Education:  Patient was educated on the following topics:  Pt ed on importance + benefits to perform bed mobility and exercises every 1-2 hours to increase/maintain LE/core strength. Pt ed on importance and benefits of proper posture, and positioning every hr to prevent break down of skin and bed sores; all ed to promote functional mobility; pt verbalized understanding Discharge Recommendations:  Rehab and Inpatient Rehab Further Equipment Recommendations for Discharge:  rolling walker Factors which may impact discharge planning: none SUBJECTIVE:  
Patient stated I do not think I am strong enough for this.  OBJECTIVE DATA SUMMARY:  
Critical Behavior: 
Neurologic State: Alert Orientation Level: Oriented X4 Cognition: Follows commands Safety/Judgement: Fall prevention [address] Standing Balance Scale 
0: Pt performs 25% or less of standing activity (Max assist) CN, 100% impaired. 1: Pt supports self with upper extremities but requires therapist assistance. Pt performs 25-50% of effort (Mod assist) CM, 80% to <100% impaired. 1+: Pt supports self with upper extremities but requires therapist assistance. Pt performs >50% effort. (Min assist).  CL, 60% to <80% impaired. 2: Pt supports self independently with both upper extremities (walker, crutches, parallel bars). CL, 60% to <80% impaired. 2+: Pt support self independently with 1 upper extremity (cane, crutch, 1 parallel bar). CK, 40% to <60% impaired. 3: Pt stands without upper extremity support for up to 30 seconds. CK, 40% to <60% impaired. 3+: Pt stands without upper extremity support for 30 seconds or greater. CJ, 20% to <40% impaired. 4: Pt independently moves and returns center of gravity 1-2 inches in one plane. CJ, 20% to <40% impaired. 4+: Pt independently moves and returns center of gravity 1-2 inches in multiple planes. CI, 1% to <20% impaired. 5: Pt independently moves and returns center of gravity in all planes greater than 2 inches. CH, 0% impaired. Functional Mobility: 
 
 
Functional Status Indep (I) Mod I Super-vision Min A Mod A Max A Total A Assist x2 Verbal cues Additional time Not tested Comments Rolling []  []  [x] []    []    []  []  [] [] [] [] Supine to sit []  []  [x] []  []  []  []  [] [] [] [] Sit to supine []  []  [x] []  []  []  []  [] [] [] [] Sit to stand []  []  CGA [x]  []  []  []  [] [] [] []   
Stand to sit []  []  [x]SBA []  []  []  []  [] [] [] [] Bed to chair transfers []  []  [] []  []  []  []  [] [] [] [] Balance Good Pervis Slice Poor Unable Not tested Comments Sitting static [x]  []  []  []  [] Sitting dynamic [x]  []  []  []  []   
Standing static []  [x]  []  []  []   
Standing dynamic []  []  []  []  [] Mobility/Gait:  
Level of Assistance: Contact guard assistance Assistive Device: gait belt and rolling walker Distance Ambulated: 4 feet   x 2 Speed/Nadia: pace decreased (<100 feet/min) Therapeutic Exercises:  
 
 
 
EXERCISE Sets Reps Active Active Assist  
Passive Self ROM Comments Ankle Pumps 1 10  [x] [] [] [] Quad Sets/Glut Sets   [] [] [] [] Hamstring Sets   [] [] [] [] Short Arc Quads   [] [] [] [] Heel Slides   [] [] [] [] Straight Leg Raises 1 10 [x] [] [] [] Hip Abd/Add   [] [] [] [] Long Arc Quads 1 10 [x] [] [] [] Seated Marching 1 10 [x] [] [] []   
Standing Marching   [] [] [] []   
sit<>std  1 5 [x] [] [] []   
 
 
Vital Signs Temp: 98.5 °F (36.9 °C) Pulse (Heart Rate): 82    
BP: 103/51 Resp Rate: 18    
O2 Sat (%): (!) 89 %(RN NOTIFIED)Pain:0 
Pre treatment pain level:0 Post treatment pain level:0Pain Scale 1: Numeric (0 - 10) Pain Intensity 1: 7 Pain Location 1: Knee Pain Orientation 1: Right Pain Description 1: Throbbing Pain Intervention(s) 1: Medication (see MAR) Activity Tolerance:  
fair, fatigues after gait training, and SOB After treatment:  
Patient left in no apparent distress sitting up in chair Patient left in no apparent distress in sitting up on EOB, daughter in room(X) Call bell left within reach(X) Nursing notified(X) Caregiver present Bed alarm activated Loren Sanchez PTA Time Calculation: 20 mins

## 2019-02-03 NOTE — PROGRESS NOTES
[facility] 
[address] [place], [address] [address]. Greg Hernández,  Dr. Gustavo Rueda [phone] FAX# [phone] PROGRESS NOTE Patient: Joseluis Naik MRN: [id number]  SSN: [id number] YOB: 1945  Age: 74 y.o. Sex: male Date: 2/3/2019 Hospital: [facility] Post-Op Day: 2 Plan:  
increase activity and out of bed probable dc in 1-2 days Assessment:  
good progress and wounds fine Subjective: Moderate pain Not required Objective:  
Admit weight: Weight: 57.2 kg (126 lb) Last recorded weight: Weight: 57.2 kg (126 lb) Visit Vitals /51 Pulse 82 Temp 98.5 °F (36.9 °C) Resp 18 Ht 5' 7\" (1.702 m) Wt 57.2 kg (126 lb) SpO2 (!) 89% BMI 19.73 kg/m² Intake/Output Summary (Last 24 hours) at 2/3/2019 1124 Last data filed at 2/3/2019 6271 Gross per 24 hour Intake 720 ml Output 1350 ml Net -630 ml Right leg stump clean and dry Labs:  
No results for input(s): WBC, HGB, HCT, PLT, RDW, HGBEXT, HCTEXT, PLTEXT in the last 72 hours. Recent Labs 02/03/19 
0501 02/02/19 
0400 02/01/19 
0510  138 138  
K 4.3 4.5 4.1  102 100 CO2 29 28 31 * 155* 94 BUN 21* 18 19* CREA 0.56* 0.71 0.64 CA 8.2* 7.9* 9.1 INR  --   --  1.1 No results for input(s): PH, PCO2, PO2, HCO3, FIO2 in the last 72 hours.  
 
 
Lucila Martino MD, FACS

## 2019-02-03 NOTE — PROGRESS NOTES
Problem: Falls - Risk of 
Goal: *Absence of Falls Document Orvel Buffy Fall Risk and appropriate interventions in the flowsheet. Outcome: Progressing Towards Goal 
Fall Risk Interventions: 
Mobility Interventions: PT Consult for mobility concerns Medication Interventions: Evaluate medications/consider consulting pharmacy Elimination Interventions: Call light in reach Problem: Pressure Injury - Risk of 
Goal: *Prevention of pressure injury Document Gee Scale and appropriate interventions in the flowsheet. Outcome: Progressing Towards Goal 
Pressure Injury Interventions: 
Sensory Interventions: Assess changes in LOC Moisture Interventions: Absorbent underpads Activity Interventions: Pressure redistribution bed/mattress(bed type) Mobility Interventions: HOB 30 degrees or less Nutrition Interventions: Document food/fluid/supplement intake Friction and Shear Interventions: HOB 30 degrees or less

## 2019-02-04 VITALS
HEART RATE: 86 BPM | DIASTOLIC BLOOD PRESSURE: 54 MMHG | HEIGHT: 67 IN | OXYGEN SATURATION: 91 % | WEIGHT: 126 LBS | BODY MASS INDEX: 19.78 KG/M2 | TEMPERATURE: 98.8 F | RESPIRATION RATE: 17 BRPM | SYSTOLIC BLOOD PRESSURE: 92 MMHG

## 2019-02-04 LAB
HBA1C MFR BLD: 6.4 % (ref 4.2–5.6)
VANCOMYCIN TROUGH SERPL-MCNC: 17.8 UG/ML (ref 10–20)

## 2019-02-04 PROCEDURE — 74011250636 HC RX REV CODE- 250/636: Performed by: HOSPITALIST

## 2019-02-04 PROCEDURE — 74011250636 HC RX REV CODE- 250/636: Performed by: SURGERY

## 2019-02-04 PROCEDURE — 77030037878 HC DRSG MEPILEX >48IN BORD MOLN -B

## 2019-02-04 PROCEDURE — 74011000258 HC RX REV CODE- 258: Performed by: SURGERY

## 2019-02-04 PROCEDURE — 3331090001 HH PPS REVENUE CREDIT

## 2019-02-04 PROCEDURE — 74011000250 HC RX REV CODE- 250: Performed by: HOSPITALIST

## 2019-02-04 PROCEDURE — 74011250637 HC RX REV CODE- 250/637: Performed by: HOSPITALIST

## 2019-02-04 PROCEDURE — 80202 ASSAY OF VANCOMYCIN: CPT

## 2019-02-04 PROCEDURE — 83036 HEMOGLOBIN GLYCOSYLATED A1C: CPT

## 2019-02-04 PROCEDURE — 3331090002 HH PPS REVENUE DEBIT

## 2019-02-04 PROCEDURE — 36415 COLL VENOUS BLD VENIPUNCTURE: CPT

## 2019-02-04 PROCEDURE — 97166 OT EVAL MOD COMPLEX 45 MIN: CPT

## 2019-02-04 PROCEDURE — 94640 AIRWAY INHALATION TREATMENT: CPT

## 2019-02-04 RX ORDER — OXYCODONE AND ACETAMINOPHEN 5; 325 MG/1; MG/1
1 TABLET ORAL
Qty: 30 TAB | Refills: 0 | Status: SHIPPED | OUTPATIENT
Start: 2019-02-04

## 2019-02-04 RX ORDER — LOSARTAN POTASSIUM 50 MG/1
100 TABLET ORAL DAILY
Qty: 30 TAB | Refills: 0 | Status: SHIPPED
Start: 2019-02-05

## 2019-02-04 RX ADMIN — ASPIRIN 81 MG 81 MG: 81 TABLET ORAL at 08:56

## 2019-02-04 RX ADMIN — HYDROMORPHONE HYDROCHLORIDE 1 MG: 1 INJECTION, SOLUTION INTRAMUSCULAR; INTRAVENOUS; SUBCUTANEOUS at 13:35

## 2019-02-04 RX ADMIN — ARFORMOTEROL TARTRATE 15 MCG: 15 SOLUTION RESPIRATORY (INHALATION) at 08:13

## 2019-02-04 RX ADMIN — SODIUM CHLORIDE 1000 MG: 900 INJECTION, SOLUTION INTRAVENOUS at 08:57

## 2019-02-04 RX ADMIN — PIPERACILLIN SODIUM,TAZOBACTAM SODIUM 3.38 G: 3; .375 INJECTION, POWDER, FOR SOLUTION INTRAVENOUS at 01:11

## 2019-02-04 RX ADMIN — PIPERACILLIN SODIUM,TAZOBACTAM SODIUM 3.38 G: 3; .375 INJECTION, POWDER, FOR SOLUTION INTRAVENOUS at 06:23

## 2019-02-04 RX ADMIN — OXYCODONE AND ACETAMINOPHEN 1 TABLET: 5; 325 TABLET ORAL at 14:48

## 2019-02-04 RX ADMIN — OXYCODONE AND ACETAMINOPHEN 1 TABLET: 5; 325 TABLET ORAL at 08:56

## 2019-02-04 RX ADMIN — BUDESONIDE 500 MCG: 0.5 INHALANT RESPIRATORY (INHALATION) at 08:13

## 2019-02-04 NOTE — PROGRESS NOTES
Assumed patient care from [facility]. Received patient awake, alert, oriented X 4. Patient denies pain at this time. Bed is locked and in lowest position and call bell is within reach. Not in acute distress.

## 2019-02-04 NOTE — PROGRESS NOTES
Pt accepted to [facility] for today. Called Dr. Tiki Antoine off ice this AM to see who will be rounding to do discharge summary, Dr. Melvin Mccartney will be rounding & can do discharge summary between cases. Met with pt/dtr & made them aware of above, agreeable to transfer. Stopped in umanzor by son-n-, has concerns with pt being discharged without L heel wound being addressed & is asking for lab work to be done & doctor to look @ wound. Made him aware that I will page hospitalist to answer his medical questions. Paged Dr. Rico Cooney, awaiting call back. [facility] set up for 1500. Katalina Abdi,RN,ext [phone]. Dr. Shan Mccartney met with family & addressed their concerns. Katalina Abdi RN,ext [phone]. Care Management Interventions PCP Verified by CM: Yes Last Visit to PCP: 01/16/19 Mode of Transport at Discharge: BLS Transition of Care Consult (CM Consult): SNF Partner SNF: No 
Reason Why Partner SNF Not Chosen: Location Discharge Durable Medical Equipment: No 
Physical Therapy Consult: Yes Occupational Therapy Consult: Yes Speech Therapy Consult: No 
Current Support Network: Lives with Spouse Confirm Follow Up Transport: Other (see comment)(pt states unsure, possibly cab) Plan discussed with Pt/Family/Caregiver: Yes Freedom of Choice Offered: Yes Discharge Location Discharge Placement: Skilled nursing facility([facility])

## 2019-02-04 NOTE — PROGRESS NOTES
Problem: Falls - Risk of 
Goal: *Absence of Falls Document Tomás Bruno Fall Risk and appropriate interventions in the flowsheet. Outcome: Progressing Towards Goal 
Fall Risk Interventions: 
Mobility Interventions: PT Consult for mobility concerns Medication Interventions: Evaluate medications/consider consulting pharmacy Elimination Interventions: Call light in reach Problem: Pressure Injury - Risk of 
Goal: *Prevention of pressure injury Document Gee Scale and appropriate interventions in the flowsheet. Outcome: Progressing Towards Goal 
Pressure Injury Interventions: 
Sensory Interventions: Assess changes in LOC Moisture Interventions: Absorbent underpads Activity Interventions: Pressure redistribution bed/mattress(bed type) Mobility Interventions: HOB 30 degrees or less Nutrition Interventions: Document food/fluid/supplement intake Friction and Shear Interventions: HOB 30 degrees or less

## 2019-02-04 NOTE — PROGRESS NOTES
1950: Assumed patient care. Received report from  Susana Carrera, [facility] (offgoing nurse). Report included SBAR, Kardex, and MAR. Patient resting in bed, in no signs of pain or distress. Call light and possessions within reach. Bed in lowest setting. 2118: Explained importance of shifting weight to prevent wound breakdown on sacrum; patient verbalized understanding; offered pillow to place under hip to help shift weight- patient declined; nurse instructed to shift weight occasionally 
- wound on left heel cleaned; declined mepolex

## 2019-02-04 NOTE — ROUTINE PROCESS
Assume care of patient from Kurt Samuel, [facility]. Patient received in bed awake. Patient A&Ox4, denies pain and discomfort. No distress noted. Frequently use items within reach. Patient assisted to the bed side commode by this nurse and Kurt Samuel RN. Pt had brown medium formed bowel movement. Pt safety assisted back to bed. Bed locked in low position. Call bell within reach and patient verbalized understanding of use for assistance and needs. 0848- Pt's BP 94/54. Held Hydrochlorothiazide and Losartan. Called MD and was informed to call hospitalist. Juan De Los Santos and page Dr. Richmond Gomez, awaiting call back. 1220- Spoke and informed Dr. Richmond Gomez during rounds of pt's BP medication, stated okay and will d/c HCTZ at discharge. 1505- Left heel has been cleansed with saline, painted with betadine with dry gauze dressing per MD order. Patient has been taught to wear surgical / velcro shoe for protection and shoe has been placed on pt's left foot. Patient has also beern taught to elevate left heel when in bed to prevent ulcer progression. 1515- Incentive spirometry at bedside. Patient made aware purpose, benefit and frequency; voiced understanding. Patient able to correctly do technique and reach 200 level at this time. Call bell within reach. 1629- Patient discharged to [facility], accompanied by medical transport via stretcher. No distress noted. Transport has discharge instructions, along with pt's belongings.  Voiced understanding of discharge instructions.

## 2019-02-04 NOTE — DISCHARGE INSTRUCTIONS
DISCHARGE SUMMARY from Nurse    PATIENT INSTRUCTIONS:    After general anesthesia or intravenous sedation, for 24 hours or while taking prescription Narcotics:  · Limit your activities  · Do not drive and operate hazardous machinery  · Do not make important personal or business decisions  · Do  not drink alcoholic beverages  · If you have not urinated within 8 hours after discharge, please contact your surgeon on call. Report the following to your surgeon:  · Excessive pain, swelling, redness or odor of or around the surgical area  · Temperature over 100.5  · Nausea and vomiting lasting longer than 4 hours or if unable to take medications  · Any signs of decreased circulation or nerve impairment to extremity: change in color, persistent  numbness, tingling, coldness or increase pain  · Any questions    What to do at Home:  Recommended activity: PT/OT Eval and Treat,     If you experience any of the following symptoms related to your diagnosis as listed in your teachings, please follow up with your primary care provider and or call 911. *  Please give a list of your current medications to your Primary Care Provider. *  Please update this list whenever your medications are discontinued, doses are      changed, or new medications (including over-the-counter products) are added. *  Please carry medication information at all times in case of emergency situations. These are general instructions for a healthy lifestyle:    No smoking/ No tobacco products/ Avoid exposure to second hand smoke  Surgeon General's Warning:  Quitting smoking now greatly reduces serious risk to your health.     Obesity, smoking, and sedentary lifestyle greatly increases your risk for illness    A healthy diet, regular physical exercise & weight monitoring are important for maintaining a healthy lifestyle    You may be retaining fluid if you have a history of heart failure or if you experience any of the following symptoms:  Weight gain of 3 pounds or more overnight or 5 pounds in a week, increased swelling in our hands or feet or shortness of breath while lying flat in bed. Please call your doctor as soon as you notice any of these symptoms; do not wait until your next office visit. Recognize signs and symptoms of STROKE:    F-face looks uneven    A-arms unable to move or move unevenly    S-speech slurred or non-existent    T-time-call 911 as soon as signs and symptoms begin-DO NOT go       Back to bed or wait to see if you get better-TIME IS BRAIN. Warning Signs of HEART ATTACK     Call 911 if you have these symptoms:   Chest discomfort. Most heart attacks involve discomfort in the center of the chest that lasts more than a few minutes, or that goes away and comes back. It can feel like uncomfortable pressure, squeezing, fullness, or pain.  Discomfort in other areas of the upper body. Symptoms can include pain or discomfort in one or both arms, the back, neck, jaw, or stomach.  Shortness of breath with or without chest discomfort.  Other signs may include breaking out in a cold sweat, nausea, or lightheadedness. Don't wait more than five minutes to call 911 - MINUTES MATTER! Fast action can save your life. Calling 911 is almost always the fastest way to get lifesaving treatment. Emergency Medical Services staff can begin treatment when they arrive -- up to an hour sooner than if someone gets to the hospital by car. The discharge information has been reviewed with the patient. The patient verbalized understanding. Discharge medications reviewed with the patient and appropriate educational materials and side effects teaching were provided. Patient armband removed and shredded.    ___________________________________________________________________________________________________________________________________

## 2019-02-04 NOTE — PROGRESS NOTES
Physical Exam  
Skin: Skin is warm and dry. Wounds present on admission. Wound present within the buttock area. Mepliex applied. Wound on left heel, pt refused mepliex, left open to air.

## 2019-02-04 NOTE — PROGRESS NOTES
Internal Medicine Progress Note Patient's Name: Roe November Admit Date: 1/29/2019 Length of Stay: 6 Assessment/Plan Active Hospital Problems Diagnosis Date Noted  S/P AKA (above knee amputation) unilateral ([facility] 02/02/2019  Surgical wound dehiscence 01/29/2019  Peripheral arterial disease ([facility] 12/26/2018  Hypertension 12/26/2018  COPD (chronic obstructive pulmonary disease) ([facility] 12/26/2018  Insomnia 12/26/2018 - Diet and mobilization per primary team 
- Pain control PRN 
- PT/OT 
- D/c HCTZ at discharge w/o only ARB given borderline BP on combo/higher dose ARB 
- Can likely d/c IVAB at this point given 4 days post operation where clean margins obtained, defer to vasc surgery for further need - F/u heel ulcer outpt setting 
- OK for d/c otherwise from medicine standpoint 
- Cont acceptable home medications for chronic conditions  
- DVT protocol I have personally reviewed all pertinent labs and films that have officially resulted over the last 24 hours. I have personally checked for all pending labs that are awaiting final results. Subjective Pt s/e @ bedside No major events overnight Pt offers no complaints this AM 
Eager for discharge Denies CP or SOB Objective Visit Vitals BP 92/54 (BP 1 Location: Left arm, BP Patient Position: Supine) Pulse 86 Temp 98.8 °F (37.1 °C) Resp 17 Ht 5' 7\" (1.702 m) Wt 57.2 kg (126 lb) SpO2 91% BMI 19.73 kg/m² Physical Exam: 
General Appearance: NAD, conversant Lungs: CTA with normal respiratory effort CV: RRR, no m/r/g Abdomen: soft, non-tender, normal bowel sounds Extremities: no cyanosis, R AKA w/ dressing in place, L heel ulcer w/o erythema Neuro: No focal deficits, motor/sensory intact Lab/Data Reviewed: 
BMP: No results found for: NA, K, CL, CO2, AGAP, GLU, BUN, CREA, GFRAA, GFRNA 
CBC: No results found for: WBC, HGB, HGBEXT, HCT, HCTEXT, PLT, PLTEXT, HGBEXT, HCTEXT, PLTEXT 
 
 Imaging Reviewed: 
No results found. Medications Reviewed: 
Current Facility-Administered Medications Medication Dose Route Frequency  piperacillin-tazobactam (ZOSYN) 3.375 g in 0.9% sodium chloride (MBP/ADV) 100 mL MBP \"EXTENDED 4 HOUR INFUSION###\"   3.375 g IntraVENous Q8H  
 [START ON 2/6/2019] VANCOMYCIN INFORMATION NOTE   Other ONCE  
 HYDROmorphone (PF) (DILAUDID) injection 1 mg  1 mg IntraVENous Q3H PRN  
 vancomycin (VANCOCIN) 1,000 mg in 0.9% sodium chloride (MBP/ADV) 250 mL adv  1,000 mg IntraVENous Q12H  
 labetalol (NORMODYNE;TRANDATE) 20 mg/4 mL (5 mg/mL) injection 5 mg  5 mg IntraVENous PRN  
 VANCOMYCIN INFORMATION NOTE 1 Each  1 Each Other Rx Dosing/Monitoring  aspirin chewable tablet 81 mg  81 mg Oral DAILY  temazepam (RESTORIL) capsule 30 mg  30 mg Oral QHS PRN  
 arformoterol (BROVANA) neb solution 15 mcg  15 mcg Nebulization BID RT And  
 budesonide (PULMICORT) 500 mcg/2 ml nebulizer suspension  500 mcg Nebulization BID RT  
 acetaminophen (TYLENOL) tablet 650 mg  650 mg Oral Q4H PRN  
 HYDROcodone-acetaminophen (NORCO) 5-325 mg per tablet 1 Tab  1 Tab Oral Q6H PRN  
 oxyCODONE-acetaminophen (PERCOCET) 5-325 mg per tablet 1 Tab  1 Tab Oral Q6H PRN  
 losartan (COZAAR) tablet 100 mg  100 mg Oral DAILY Ananth Hawley DO Internal Medicine, Hospitalist 
Pager: [phone] [facility]

## 2019-02-04 NOTE — DIABETES MGMT
Glycemic Control/Nutrition:  Nurse Director asked me to speak to the pts family because they are concerned that pt has diabetes. Reviewed pts records and labs through [facility]. No history of Diabetes documented from my review. Son insisted that we obtain a HbA1c which resulted in A1c=6.4%, indicating an average BG 132mg/dl for the last 2-3 months. BG has been in the target range this admission. Discussed with son and pt. Encouraged increased intake to meet calorie and protein reqts.  Kristyn PHAN

## 2019-02-04 NOTE — PROGRESS NOTES
Nutrition follow-up/Plan of care RECOMMENDATIONS:  
1. Dental Soft Solid Diet (chopped meats w/ gravy) per preference 2. Ensure Enlive TID (Chocolate) 3. Monitor labs, weight and PO intake 4. RD to follow GOALS:  
1. Ongoing: PO intake meets >75% of protein/calorie needs by 2/9 
2. Ongoing: Weight Maintenance/gradual gain (1-2 lb/week) by 2/11 ASSESSMENT: 
Wt status is classified as normal per BMI of 19.7. However, Pt at nutrition risk d/t BMI <23 and age >65 years. Per documented weight records Pt was 139 lb on (3/15/2018) equating to a total of 13 lb or 9.3% loss x 11-12 months with 8 lb or 6% x past 3 weeks. Adequate PO intake per vitals. Labs noted. Nutrition recommendations listed. RD to follow. Nutrition Diagnoses:  
Unintentional weight loss related to inadequate energy absorption PTA as evidenced by a total of 13 lb or 9.3% loss x 11-12 months with 8 lb or 6% x past 3 weeks per documented weight records. Meets Criteria for Acute Malnutrition  
[x] Severe Malnutrition, as evidenced by: 
 [x] Moderate muscle wasting, loss of subcutaneous fat 
 [] Nutritional intake of <50% of recommended intake for >5 days [x] Weight loss of >1-2% in 1 week, >5% in 1 month, >7.5% in 3 months, or >10% in 6 months 
 [] Moderate-severe edema Nutrition Risk:  [] High  [x] Moderate []  Low SUBJECTIVE/OBJECTIVE:  
(2/4): Pt is s/p right above knee amputation on (2/1). Noted multiple wounds including pressure injuries documented by nursing. Appetite/PO intake has been good per vitals (%). Pt seen in room with family at bedside. Reports he is doing well and consuming his meals and supplements. Continue Ensure Enlive TID for additional calories/protein. Plan to go to [facility] later today. Will monitor. (1/30):  Pt admitted for surgery due to surgical wound dehiscence. Per chart review (right leg wound washout with possible wound vac placement). He was recently admitted 12/26-1/4 where he underwent atherectomy and stent placement of R pop artery occlusion. Familiar with Pt from recent admission when some Hx was obtained. Variability in recent weights recorded. Per documented weight records Pt was 139 lb on (3/15/2018) equating to a total of 13 lb or 9.3% loss x 11-12 months with 8 lb or 6% x past 3 weeks. Pt seen in room; appears thin with noted muscle wasting and SQ fat loss. Denies having any food allergies, prefers softer texture foods. Reports having a good appetite and consumes 3 meals per day at home? Confirms recent weight loss. Not currently consuming any nutritional supplements at home, but agreeable to try Ensure Enlive (chocolate) once diet is advance. Will monitor. Information Obtained from:  
 [x] Chart Review [x] Patient [x] Family/Caregiver 
 [] Nurse/Physician 
 [] Interdisciplinary Meeting/Rounds Diet: Regular Diet (chopped meats w/ gravy) Medications: [x] Reviewed Allergies: [x] Reviewed Patient Active Problem List  
Diagnosis Code  Status post arterial stent Z95.9  High cholesterol E78.00  Hypertension I10  
 Peripheral arterial disease (HCC) I73.9  
 COPD (chronic obstructive pulmonary disease) ([facility]) J44.9  Insomnia G47.00  Postprocedural hypotension I95.81  
 Surgical wound dehiscence T81.31XA  S/P AKA (above knee amputation) unilateral ([facility] [address] Z89.619 Past Medical History:  
Diagnosis Date  High cholesterol  Hypertension  PAD (peripheral artery disease) ([facility] [address] Labs:   
Lab Results Component Value Date/Time  Sodium 138 02/03/2019 05:01 AM  
 Potassium 4.3 02/03/2019 05:01 AM  
 Chloride 101 02/03/2019 05:01 AM  
 CO2 29 02/03/2019 05:01 AM  
 Anion gap 8 02/03/2019 05:01 AM  
 Glucose 127 (H) 02/03/2019 05:01 AM  
 BUN 21 (H) 02/03/2019 05:01 AM  
 Creatinine 0.56 (L) 02/03/2019 05:01 AM  
 Calcium 8.2 (L) 02/03/2019 05:01 AM  
 Magnesium 1.6 12/29/2018 01:15 AM  
 Albumin 3.6 12/26/2018 02:05 PM  
 
Anthropometrics: BMI (calculated): 19.7 Last 3 Recorded Weights in this Encounter 01/29/19 1601 Weight: 57.2 kg (126 lb) Ht Readings from Last 1 Encounters:  
01/29/19 5' 7\" (1.702 m) Weight Metrics 1/29/2019 1/6/2019 1/2/2019 5/19/2015 Weight 126 lb 134 lb 134 lb 14.7 oz 158 lb BMI 19.73 kg/m2 20.37 kg/m2 20.51 kg/m2 24.03 kg/m2 Per Care Everywhere: 
 
 
 
 
 
 
Patient Vitals for the past 100 hrs: 
 % Diet Eaten 02/03/19 1656 75 % 02/03/19 1317 80 % 02/03/19 0940 100 % 02/02/19 1758 75 % 02/02/19 1230 100 % 01/31/19 1706 100 % 01/31/19 0942 95 % IBW: 148 lb %IBW: 85% [x] Weight Loss PTA?? (Variability in weights recorded) 
[] Weight Gain 
[] Weight Stable per Pt and family Estimated Nutrition Needs: [x] MSJ  [] Other: 
Calories: 0065-1502 kcal Based on:   [x] Actual BW   
Protein:   69-86 g Based on:   [x] Actual BW Fluid:       3291-3016 ml Based on:   [x] Actual BW  
 
 [x] No Cultural, Restorationism or ethnic dietary need identified. [] Cultural, Restorationism and ethnic food preferences identified and addressed Wt Status:  [x] Normal (18.6 - 24.9) [] Underweight (<18.5) [] Overweight (25 - 29.9) [] Mild Obesity (30 - 34.9)  [] Moderate Obesity (35 - 39.9) [] Morbid Obesity (40+) Nutrition Problems Identified:  
[] Suboptimal PO intake  
[] Food Allergies [x] Difficulty chewing/swallowing/poor dentition 
[] Constipation/Diarrhea  
[] Nausea/Vomiting  
[] None 
[x] Other: poor wound healing and weight loss Plan:  
[] Therapeutic Diet 
[]  Obtained/adjusted food preferences/tolerances and/or snacks options [x]  Continue supplements added  
[] Occupational therapy following for feeding techniques []  HS snack added  
[x]  Modify diet texture  
[]  Modify diet for food allergies []  Educate patient  
[]  Assist with menu selection  
[x]  Monitor PO intake on meal rounds [x]  Continue inpatient monitoring and intervention  
[]  Participated in discharge planning/Interdisciplinary rounds/Team meetings  
[]  Other:  
 
Education Needs: 
 [] Not appropriate for teaching at this time due to: 
 [x] Identified and addressed Nutrition Monitoring and Evaluation: 
[x] Continue ongoing monitoring and intervention 
[] Other Demetrio Bishop

## 2019-02-04 NOTE — PROGRESS NOTES
Pt son was visibly aggitated his father was having blood draws every morning as well as not receiving diabetes treatment after a glucose of 129. Father does not have DM listed in his PMH. This RN provided rational and CBC education as to why the facility did blood work every am. Also provided education on glucose elevation on post op stays.

## 2019-02-04 NOTE — PROGRESS NOTES
Hospital to [facility] SBAR Handoff - Dale Byrne 
                                                                      74 y.o.   male [address]   Room: 2107/01    [facility] 2W NEURO MED  Unit Phone# :  [phone] [facility] 2W NEURO MED 
[address] [place] [address] Dept: [phone] Loc: [phone] SITUATION Admitted:  1/29/2019         Attending Provider:  Elia Denny Consultations:  IP CONSULT TO HOSPITALIST 
 
PCP:  Zenaida Lainez MD   [phone] Treatment Team: Attending Provider: Elia Denny MD; Consulting Provider: Amanda Urbina MD; Utilization Review: Eran Dan RN; Care Manager: Morgan Garcia; Occupational Therapist: Ronnie Hannah OT; Physical Therapy Assistant: Andrew Torres PTA Admitting Dx: Wound infection Surgical wound dehiscence Principal Problem: Surgical wound dehiscence 3 Days Post-Op of  
Procedure(s): 
right above knee amputation BY: Elia Denny MD             ON: 2/1/2019 Code Status: Full Code Advance Directives:  
Advance Care Planning 1/29/2019 Confirm Advance Directive Yes, on file Patient Would Like to Complete Advance Directive - Does the patient have other document types Do Not Resuscitate; Power of [employer] (Send w/patient) Not Received Isolation:  There are currently no Active Isolations       MDRO: No current active infections Pain Medications given:  yes    Last dose: 2/4/2019 at  1448 Special Equipment needed: yes  Type of equipment: Surgical boot, Walker, bedside commode (Not currently on dialysis) (Not currently on dialysis) (Not currently on dialysis) BACKGROUND Allergies: Allergies Allergen Reactions  Augmentin [Amoxicillin-Pot Clavulanate] Diarrhea Past Medical History:  
Diagnosis Date  High cholesterol  Hypertension  PAD (peripheral artery disease) ([facility] [address] Past Surgical History:  
Procedure Laterality Date  HX APPENDECTOMY  HX MOHS PROCEDURES Left  HX TONSIL AND ADENOIDECTOMY Medications Prior to Admission Medication Sig  
 aspirin 81 mg chewable tablet Take 1 Tab by mouth daily.  oxyCODONE-acetaminophen (PERCOCET) 5-325 mg per tablet Take 1 Tab by mouth every four (4) hours as needed. Max Daily Amount: 6 Tabs. (Patient taking differently: Take 1 Tab by mouth every four (4) hours as needed for Pain.)  tamsulosin (FLOMAX) 0.4 mg capsule Take 1 Cap by mouth daily.  diazePAM (VALIUM) 5 mg tablet Take 5 mg by mouth every eight (8) hours as needed for Anxiety.  budesonide-formoterol (SYMBICORT) 160-4.5 mcg/actuation HFAA Take 2 Puffs by inhalation two (2) times a day.  losartan-hydrochlorothiazide (HYZAAR) 100-25 mg per tablet Take 0.5 Tabs by mouth daily.  rosuvastatin (CRESTOR) 40 mg tablet Take 40 mg by mouth nightly.  temazepam (RESTORIL) 30 mg capsule Take 30 mg by mouth nightly as needed for Sleep. Hard scripts included in transfer packet yes Vaccinations: There is no immunization history for the selected administration types on file for this patient. Readmission Risks:    Known Risks: None The Charlson CoMorbitiy Index tool is an evidenced based tool that has more automatic generated information. The tool looks at many different items such as the age of the patient, how many times they were admitted in the last calendar year, current length of stay in the hospital and their diagnosis. All of these items are pulled automatically from information documented in the chart from various places and will generate a score that predicts whether a patient is at low (less than 13), medium (13-20) or high (21 or greater) risk of being readmitted. ASSESSMENT Temp: 98.8 °F (37.1 °C) (02/04/19 0848) Pulse (Heart Rate): 86 (02/04/19 0848) Resp Rate: 17 (02/04/19 0848)           BP: 92/54 (02/04/19 0848) O2 Sat (%): 91 % (02/04/19 0848) Weight: 57.2 kg (126 lb)    Height: 5' 7\" (170.2 cm) (01/29/19 1601) If above not within 1 hour of discharge: 
 
BP:_____  P:____  R:____ T:_____ O2 Sat: ___%  O2: ______ Active Orders Diet DIET REGULAR Orientation: oriented to time, place, person and situation Active Behaviors: None Active Lines/Drains:  (Peg Tube / Ramirez / CL or S/L?): no 
 
Urinary Status: Voiding     Last BM: Last Bowel Movement Date: 02/02/19 Skin Integrity: Wound (add Wound LDA) Wound Leg Right-Dressing Status : Intact Wound Leg Right-Dressing Status : Clean, dry, and intact Wound-Dressing Status : Clean, dry, and intact Wound-Dressing Status : Clean, dry, and intact Wound-Dressing Status : Other (comment)(GUADALUPE) Wound Sacrum-Dressing Status : Clean, dry, and intact Wound Heel Left-Dressing Status : Dry Wound Leg Right-Dressing Type : (open to ai) Wound Leg Right-Dressing Type : 4 x 4, ABD pad, Xeroform, Gauze Wound-Dressing Type : Open to air Wound-Dressing Type : Open to air Wound-Dressing Type : Open to air Wound Sacrum-Dressing Type : Foam, Protective barrier Wound Heel Left-Dressing Type : Open to air Mobility: Very limited Weight Bearing Status: WBAT (Weight Bearing as Tolerated) Lab Results Component Value Date/Time Glucose 127 (H) 02/03/2019 05:01 AM  
 Hemoglobin A1c 6.4 (H) 02/04/2019 07:30 AM  
 INR 1.1 02/01/2019 05:10 AM  
 INR 1.0 12/26/2018 02:05 PM  
 HGB 10.9 (L) 01/31/2019 06:10 AM  
 HGB 12.0 (L) 01/29/2019 03:19 PM  
  
  RECOMMENDATION See After Visit Summary (AVS) for: · Discharge instructions · After [address] · Special equipment needed (entered pre-discharge by Care Management) · Medication Reconciliation · Follow up Appointment(s) Report given/sent by:  Joseluis Hunter Verbal report given to: Anabel Diaz LPN  FAXED to:  [phone] Estimated discharge time:  2/4/2019 at 1600

## 2019-02-04 NOTE — PROGRESS NOTES
Problem: Self Care Deficits Care Plan (Adult) Goal: *Acute Goals and Plan of Care (Insert Text) Occupational Therapy Goals Initiated 2/4/2019 within 7day(s). 1.  Patient will perform grooming tasks while standing with < 3 rest breaks and minimal assistance for balance. 2.  Patient will perform lower body dressing with supervision and fair+ dynamic sitting balance. 3.  Patient will perform functional task in standing for 8 minutes with fair standing balance and minimal assistance to increase activity tolerance for ADLs. 4.  Patient will perform toilet transfers with supervision/set-up. 5.  Patient will perform all aspects of toileting with supervision/set-up. 6.  Patient will participate in upper extremity therapeutic exercise/activities with supervision/set-up for 8 minutes to maintain BUE strength for functional transfers and ADLs. 7.  Patient will utilize energy conservation techniques during functional activities with minimal verbal cues. Outcome: [address] Occupational THERAPY: Initial Assessment INPATIENT: Medicare: Hospital Day: 7 Patient: Federico Garza (57 y.o. male)    Date: 2/4/2019 Primary Diagnosis: Wound infection Surgical wound dehiscence Procedure(s) (LRB): 
right above knee amputation (Right), 3 Days Post-Op, Precautions: Falls PLOF: Pt was independent with basic self care tasks and functional mobility PTA. ASSESSMENT:  
Based on the objective data described below, the patient presents with impairments with regard to bed mobility, functional transfers, activity tolerance, and independence in ADLs. Pt supine on arrival, reports he just got back from toileting on [facility], agreeable to therapy. Dtr at bedside. Supervision for supine-->sit with additional time. Max A for LB dressing due to fair/fair- sitting balance.  2 attempts to stand with mod A & elevated surface with RW; fair/fair- standing balance during side stepping towards HOB in prep for [facility] transfer. Pt fatiguing and requiring 1 rest break. Pt returned supine with needs within reach. C/o 6/10 RLE pain. Pt educated on role of OT, POC, fall prevention strategies & typicaly post-op AKA symptoms such as phantom limb pain & sensation; pt verbalized understanding. Recommend IPR upon d/c. Recommendations for the next treatment session: BSC transfer Mr. Johana Jean will benefit from skilled intervention to address the above impairments. His rehabilitation potential is considered to be Good. EDUCATION Education:  Patient was educated on the following topics: role of OT and POC Barriers to Learning/Limitations: None Compensate with: visual, verbal, tactile, kinesthetic cues/model PLAN OF CARE:  
Problems:  Decreased ROM, Decreased strength affecting function, Decreased ADL/functioning of activities, Decreased transfer abilities and Decreased activity tolerance Recommendations and Planned Interventions: 
[x]                  Self Care Training                   [x]         Therapeutic Activities [x]                  Functional Mobility Training    []          Cognitive Retraining 
[x]                  Therapeutic Exercises            [x]          Endurance Activities [x]                  Balance Training                     []          Neuromuscular Re-ed []                  Visual/Perceptual Training      [x]       Home Safety Training 
[x]                  Patient Education                    [x]          Family Training/Education []                  Other (comment): Frequency/Duration: Patient will be followed by occupational therapy 4-7 times a week to address goals. Discharge Recommendations: Inpatient Rehab Further Equipment Recommendations for Discharge: bedside commode SUBJECTIVE:  
Patient stated: \"I just got up. \" OBJECTIVE/TREATMENT:  
 
Past Medical History:  
Diagnosis Date  High cholesterol  Hypertension  PAD (peripheral artery disease) ([facility] [address] Past Surgical History:  
Procedure Laterality Date  HX APPENDECTOMY  HX MOHS PROCEDURES Left  HX TONSIL AND ADENOIDECTOMY Eval Complexity: History: MEDIUM Complexity : Expanded review of history including physical, cognitive and psychosocial  history ; Examination: MEDIUM Complexity : 3-5 performance deficits relating to physical, cognitive , or psychosocial skils that result in activity limitations and / or participation restrictions; Decision Making:MEDIUM Complexity : Patient may present with comorbidities that affect occupational performnce. Miniml to moderate modification of tasks or assistance (eg, physical or verbal ) with assesment(s) is necessary to enable patient to complete evaluation Prior Level of Function/Home Situation: Fully active; able to carry on all performance without restriction Restricted in physically strenuous activity but ambulatory and able to carry out work of a light or sedentary nature (e.g., light house work, office work). Lives with Spouse 
none reported Cognitive/Behavioral Status:  
Neurologic State: alert Orientation: oriented to time, place, person and situation Cognition:   appropriate decision making, appropriate for age attention/concentration, appropriate safety awareness and following commands  follows multi-step complex commands/direction Safety/Judgement: Awareness of environment and Fall prevention ROM: within normal limits (BUEs) MMT:4/5 (Erlene Level) Coordination: BUEs [facility] Hand dominance:Right Skin: Intact (BUEs) Edema: None noted (BUEs) Sensation: Intact (BUEs) Vision/Perceptual: normal 
 
 
Functional Status Indep Mod I  
Sup. / 
Set- Up SBA CGA Min Assist  
Mod Assist  
Max assist  
Total Assist  
Assist x2 Additional Time NT Comments Rolling []  []  []  [x]  []    []    []    []  []  []  []  [] Supine to sit []  []  []  []  []  []  []  []  []  []  [x]  [] Sit to supine []  []  []  []  []  []  []  []  []  []  [x]  [] Sit to stand []  []  []  []  []  [x]  []  []  []  []  []  []  Elevated surface Toilet Transfer []  []  []  []  []  []  []  []  []  []  []  [x] Feeding []  [x]  []  []  []  []  []  []  []  []  []  []    
Grooming []  []  [x]  []  []  []  []  []  []  []  []  [] Bathing  []  []  []  []  []  []  [x]  []  []  []  []  []    
UB Dressing  []  []  [x]  []  []  []  []  []  []  []  []  []    
LB Dressing  []  []  []  []  []  []  [x]  [x]  []  []  []  [] Toileting []  []  []  []  []  []  [x]  []  []  []  []  [] Balance Good Danya Tressa Poor Unable Comments Sitting static []  [x]  []  [] Sitting dynamic []  [x]  []  []  Fair-  
Standing static []  [x]  []  []    
Standing dynamic []  [x]  []  []  Fair-  
 
Pain:  
Pre treatment: 6/10 Post treatment: 6/10 Scale: numeric Activity tolerance:  fair COMMUNICATION/EDUCATION: Pt/dtr educated on role of OT and POC. They verbalized understanding. [x]         Fall prevention education was provided and the patient/caregiver indicated understanding. [x]         Patient/family have participated as able in goal setting and plan of care. [x]         Patient/family agree to work toward stated goals and plan of care. []         Patient understands intent and goals of therapy, but is neutral about his/her participation The patients plan of care was also discussed with: Physical Therapist, Certified Occupational Therapy Assistant and Registered Nurse. · After treatment position/precautions:  
o Supine in bed 
o Call light within reach 
o RN notified Recommendations for nursing: up with assist x1 & RW Thank you for this referral. 
Ulisses Figueroa MS OTR/L Time Calculation: 12 mins

## 2019-02-04 NOTE — PROGRESS NOTES
Problem: Falls - Risk of 
Goal: *Absence of Falls Document Mckenna Cardona Fall Risk and appropriate interventions in the flowsheet. Outcome: Progressing Towards Goal 
Fall Risk Interventions: 
Mobility Interventions: Communicate number of staff needed for ambulation/transfer, Patient to call before getting OOB, PT Consult for assist device competence, Utilize walker, cane, or other assistive device, Utilize gait belt for transfers/ambulation Medication Interventions: Patient to call before getting OOB, Bed/chair exit alarm, Teach patient to arise slowly Elimination Interventions: Call light in reach, Patient to call for help with toileting needs, Toileting schedule/hourly rounds, Toilet paper/wipes in reach, Urinal in reach Problem: Pressure Injury - Risk of 
Goal: *Prevention of pressure injury Document Gee Scale and appropriate interventions in the flowsheet. Outcome: Progressing Towards Goal 
Pressure Injury Interventions: 
Sensory Interventions: Assess changes in LOC, Maintain/enhance activity level, Keep linens dry and wrinkle-free Moisture Interventions: Absorbent underpads, Apply protective barrier, creams and emollients, Minimize layers Activity Interventions: Pressure redistribution bed/mattress(bed type) Mobility Interventions: Pressure redistribution bed/mattress (bed type) Nutrition Interventions: Document food/fluid/supplement intake Friction and Shear Interventions: Apply protective barrier, creams and emollients, HOB 30 degrees or less, Foam dressings/transparent film/skin sealants, Minimize layers

## 2019-02-04 NOTE — DISCHARGE SUMMARY
Physician Discharge Summary     Patient ID:  Malorie Thompson  [id number]  26 y.o.  1945    Admit date: 1/29/2019    Discharge date: 2/4/2019      Admitting Physician: Henny Kidd MD     Discharge Physician: Aletha Strong MD    Admission Diagnoses: Wound infection;Surgical wound dehiscence    Discharge Diagnoses: R medial lower leg bypass wound infection. Procedures for this admission: Procedure(s):  R medial lower leg wound washout 1/30/19  right above knee amputation 2/1/19    Discharged Condition: stable    Hospital Course: Underwent the above surgeries and ultimately required a R AKA. He was stable through out his hospitalization. At discharge the wound was intact he had controlled pain with percocet and PRN dilaudid. He has seen by [facility] prosthetics. He has a L heel eschar that has not changed by report. Some intermittent discomfort - appears to be pressure ulcer. The most recent office L RUBINA 0.7 with TBI 0.6. With od irregular SFA stenosis. Consults: hospitalist.     Significant Diagnostic Studies: none    Treatments:none    Vital Signs:   Visit Vitals  BP 92/54 (BP 1 Location: Left arm, BP Patient Position: Supine)   Pulse 86   Temp 98.8 °F (37.1 °C)   Resp 17   Ht 5' 7\" (1.702 m)   Wt 57.2 kg (126 lb)   SpO2 91%   BMI 19.73 kg/m²       Discharge Exam:   Gen - A&Ox3, NAD  HEENT - PERRL, EOMI  HEART: regular  LUNG - unlabored breathing. ABD- soft. EXT -  R AKA intact, L LE no edema, 2cm heel eschar - w/o drainage, no erythema, nontender, dry eschar - appears to be pressure ulcer. L foot warm well perfused, L popliteal pulse 2+ palpable. NEURO - intact stands with walker. Disposition: d/c to rehab facility [place] -   -resume medications with percocet as needed for pain. - plan LLE angiogram 2/13/19 at [facility] [address]- office will make arrangement.    - dry dressing to R AKA  - L heel - cleanse with saline daily, paint with betadine with dry gauze dressing, wear surgical / velcro shoe for protection. Elevate L heel when in bed to prevent ulcer progression. Patient Instructions:   Current Discharge Medication List      START taking these medications    Details   losartan (COZAAR) 50 mg tablet Take 2 Tabs by mouth daily. Qty: 30 Tab, Refills: 0         CONTINUE these medications which have NOT CHANGED    Details   aspirin 81 mg chewable tablet Take 1 Tab by mouth daily. Qty: 90 Tab, Refills: 3      oxyCODONE-acetaminophen (PERCOCET) 5-325 mg per tablet Take 1 Tab by mouth every four (4) hours as needed. Max Daily Amount: 6 Tabs. Qty: 25 Tab, Refills: 0    Associated Diagnoses: PVD (peripheral vascular disease) ([facility] [address]      tamsulosin (FLOMAX) 0.4 mg capsule Take 1 Cap by mouth daily. Qty: 30 Cap, Refills: 3      diazePAM (VALIUM) 5 mg tablet Take 5 mg by mouth every eight (8) hours as needed for Anxiety. budesonide-formoterol (SYMBICORT) 160-4.5 mcg/actuation HFAA Take 2 Puffs by inhalation two (2) times a day. rosuvastatin (CRESTOR) 40 mg tablet Take 40 mg by mouth nightly. temazepam (RESTORIL) 30 mg capsule Take 30 mg by mouth nightly as needed for Sleep. STOP taking these medications       losartan-hydrochlorothiazide (HYZAAR) 100-25 mg per tablet Comments:   Reason for Stopping:               Reference discharge instructions as provided by nursing for diet, labs, medications, activity, wound care and any outpatient referrals. Follow-up with  Dr. Case Benton 2/13/19 0600 for LLE angiogram      Oli Finley.  Case Benton, [address] [facility]  Cell - [phone]  February 4, 2019  2:28 PM

## 2019-02-05 PROCEDURE — 3331090002 HH PPS REVENUE DEBIT

## 2019-02-05 PROCEDURE — 3331090001 HH PPS REVENUE CREDIT

## 2019-02-05 NOTE — OP NOTES
[facility]  OPERATIVE REPORT    Marques Jiménez  MR#: [id number]  : 1945  ACCOUNT #: [de-identified]   DATE OF SERVICE: 2019    PREOPERATIVE DIAGNOSIS:  Infection in the right leg bypass wound. POSTOPERATIVE DIAGNOSIS:  Infection in the right leg bypass wound. PROCEDURE PERFORMED:  Right leg wound washout with debridement to the muscle and wound VAC placement. SURGEON:  Chepe Heller MD         ANESTHESIA:  General endotracheal.    ESTIMATED BLOOD LOSS:  Less than 5 mL. SPECIMENS REMOVED:  Culture. FINDINGS:  Calf muscle responsive to Bovie stimulation. There was no purulence discovered. COMPLICATIONS:  None. IMPLANTS:  None. INDICATIONS:  The patient is a very unfortunate 75-year-old male with severe atherosclerotic occlusive disease who has now experienced critical limb ischemia after failure of a right leg bypass. The distal incision has become dehisced and likely infected and he is therefore taken for a washout and possible wound VAC placement. PROCEDURE IN DETAIL:  Once informed consent was obtained, the patient was taken to the procedure room and placed supine on the table. General endotracheal anesthesia was induced. A surgical timeout was performed. The patient was identified and procedure was verified. The patient was then prepped and draped in the normal sterile fashion. The right leg was debrided sharply with a knife down to the muscle. Fluid was discovered within the popliteal space. This was cultured. The debridement continued sharply with a knife to remove all of the fibrin and necrotic skin at the edges of the wound. The wound was then copiously irrigated with a pulse  with bacitracin and saline and a wound VAC was placed and noted to have a good seal.  The patient tolerated the procedure well and was extubated and sent to the PACU in stable condition.   There were no immediate complications to the procedure.       MD SHANTI Díaz / JOEL  D: 02/04/2019 14:46     T: 02/05/2019 05:49  JOB #: [id number]

## 2019-02-05 NOTE — OP NOTES
[facility]  OPERATIVE REPORT    Ramsey Pitts  MR#: [id number]  : 1945  ACCOUNT #: [de-identified]   DATE OF SERVICE: 2019    PREOPERATIVE DIAGNOSIS:  Necrosis and gangrene of the right foot with severe peripheral arterial disease. POSTOPERATIVE DIAGNOSIS:  Necrosis and gangrene of the right foot with severe peripheral arterial disease. PROCEDURE PERFORMED:  Right above-knee amputation. SURGEON:  Terri Boston MD         ANESTHESIA:  General endotracheal.    ESTIMATED BLOOD LOSS:  200 mL. SPECIMENS REMOVED:  None. ASSISTANT:  Anayeli Otero PA-C      FINDINGS:  Good tissue at the amputation site. COMPLICATIONS:  None. IMPLANTS:  None. INDICATIONS:  The patient is an unfortunate 75-year-old male who has experienced critical limb ischemia and necrosis of his right foot. He is therefore taken for right above-knee amputation. PROCEDURE IN DETAIL:  Once informed consent was obtained, the patient was taken to the operating room and placed supine on the operating table. General endotracheal anesthesia was induced and a surgical timeout was performed. The patient was identified and the procedure verified. The patient was then prepped and draped in the normal sterile fashion. The right leg was incised in a fishmouth incision. The skin and subcutaneous tissues were divided. The dissection continued down with Bovie cautery until the femur was identified. The superficial femoral artery and vein complex were identified. These were dissected free from the surrounding tissues, clamped, divided and ligated with silk sutures. The femur was cleared proximally with a periosteal elevator and then divided with a power saw. The remainder of the soft tissue was divided with a guillotine knife. The leg was passed off to pathology for permanent section. Hemostasis was then assured with silk sutures and ties. The wound was irrigated with bacitracin and saline. Myodesis was performed with 0 Vicryl suture. The fascia was then closed in an interrupted fashion with a 0 Vicryl suture and the skin was closed with staples. The patient was cleaned and dressed and he was extubated and sent to the recovery area in stable condition. There were no immediate complications to the procedure. All sponge, needle and instrument counts were correct x2.       Sunday MD SHANTI Kemp / LN  D: 02/04/2019 14:50     T: 02/05/2019 06:04  JOB #: [id number]

## 2019-02-06 PROCEDURE — 3331090001 HH PPS REVENUE CREDIT

## 2019-02-06 PROCEDURE — 3331090002 HH PPS REVENUE DEBIT

## 2019-02-07 PROCEDURE — 3331090001 HH PPS REVENUE CREDIT

## 2019-02-07 PROCEDURE — 3331090002 HH PPS REVENUE DEBIT

## 2019-02-08 PROCEDURE — 3331090002 HH PPS REVENUE DEBIT

## 2019-02-08 PROCEDURE — 3331090001 HH PPS REVENUE CREDIT

## 2019-02-09 PROCEDURE — 3331090001 HH PPS REVENUE CREDIT

## 2019-02-09 PROCEDURE — 3331090002 HH PPS REVENUE DEBIT

## 2019-02-10 PROCEDURE — 3331090002 HH PPS REVENUE DEBIT

## 2019-02-10 PROCEDURE — 3331090001 HH PPS REVENUE CREDIT

## 2019-02-11 PROCEDURE — 3331090002 HH PPS REVENUE DEBIT

## 2019-02-11 PROCEDURE — 3331090001 HH PPS REVENUE CREDIT

## 2019-02-12 PROCEDURE — 3331090002 HH PPS REVENUE DEBIT

## 2019-02-12 PROCEDURE — 3331090001 HH PPS REVENUE CREDIT

## 2019-02-13 PROCEDURE — 3331090001 HH PPS REVENUE CREDIT

## 2019-02-13 PROCEDURE — 3331090002 HH PPS REVENUE DEBIT

## 2019-02-14 PROCEDURE — 3331090001 HH PPS REVENUE CREDIT

## 2019-02-14 PROCEDURE — 3331090002 HH PPS REVENUE DEBIT

## 2019-02-15 PROCEDURE — 3331090002 HH PPS REVENUE DEBIT

## 2019-02-15 PROCEDURE — 3331090001 HH PPS REVENUE CREDIT

## 2019-02-16 PROCEDURE — 3331090002 HH PPS REVENUE DEBIT

## 2019-02-16 PROCEDURE — 3331090001 HH PPS REVENUE CREDIT

## 2019-02-17 PROCEDURE — 3331090002 HH PPS REVENUE DEBIT

## 2019-02-17 PROCEDURE — 3331090001 HH PPS REVENUE CREDIT

## 2019-02-18 ENCOUNTER — HOSPITAL ENCOUNTER (OUTPATIENT)
Age: 74
Setting detail: OUTPATIENT SURGERY
Discharge: HOME | End: 2019-02-18
Attending: SURGERY | Admitting: SURGERY
Payer: MEDICARE

## 2019-02-18 VITALS
BODY MASS INDEX: 19.93 KG/M2 | HEIGHT: 67 IN | RESPIRATION RATE: 18 BRPM | WEIGHT: 127 LBS | OXYGEN SATURATION: 94 % | SYSTOLIC BLOOD PRESSURE: 118 MMHG | DIASTOLIC BLOOD PRESSURE: 52 MMHG | HEART RATE: 66 BPM | TEMPERATURE: 98.3 F

## 2019-02-18 DIAGNOSIS — I73.9 PVD (PERIPHERAL VASCULAR DISEASE) (HCC): ICD-10-CM

## 2019-02-18 LAB
ANION GAP SERPL CALC-SCNC: 6 MMOL/L (ref 3–18)
APTT PPP: 29 SEC (ref 23–36.4)
BUN SERPL-MCNC: 28 MG/DL (ref 7–18)
BUN/CREAT SERPL: 39 (ref 12–20)
CALCIUM SERPL-MCNC: 9 MG/DL (ref 8.5–10.1)
CHLORIDE SERPL-SCNC: 101 MMOL/L (ref 100–108)
CO2 SERPL-SCNC: 30 MMOL/L (ref 21–32)
CREAT SERPL-MCNC: 0.71 MG/DL (ref 0.6–1.3)
ERYTHROCYTE [DISTWIDTH] IN BLOOD BY AUTOMATED COUNT: 15 % (ref 11.6–14.5)
GLUCOSE SERPL-MCNC: 91 MG/DL (ref 74–99)
HCT VFR BLD AUTO: 31.9 % (ref 36–48)
HGB BLD-MCNC: 9.8 G/DL (ref 13–16)
INR PPP: 1 (ref 0.8–1.2)
MCH RBC QN AUTO: 28 PG (ref 24–34)
MCHC RBC AUTO-ENTMCNC: 30.7 G/DL (ref 31–37)
MCV RBC AUTO: 91.1 FL (ref 74–97)
PLATELET # BLD AUTO: 372 K/UL (ref 135–420)
PMV BLD AUTO: 10.4 FL (ref 9.2–11.8)
POTASSIUM SERPL-SCNC: 4.5 MMOL/L (ref 3.5–5.5)
PROTHROMBIN TIME: 13.3 SEC (ref 11.5–15.2)
RBC # BLD AUTO: 3.5 M/UL (ref 4.7–5.5)
SODIUM SERPL-SCNC: 137 MMOL/L (ref 136–145)
WBC # BLD AUTO: 9.2 K/UL (ref 4.6–13.2)

## 2019-02-18 PROCEDURE — C1769 GUIDE WIRE: HCPCS | Performed by: SURGERY

## 2019-02-18 PROCEDURE — C2625 STENT, NON-COR, TEM W/DEL SY: HCPCS | Performed by: SURGERY

## 2019-02-18 PROCEDURE — 85730 THROMBOPLASTIN TIME PARTIAL: CPT

## 2019-02-18 PROCEDURE — 74011250637 HC RX REV CODE- 250/637: Performed by: SURGERY

## 2019-02-18 PROCEDURE — 99152 MOD SED SAME PHYS/QHP 5/>YRS: CPT | Performed by: SURGERY

## 2019-02-18 PROCEDURE — 3331090001 HH PPS REVENUE CREDIT

## 2019-02-18 PROCEDURE — 74011250636 HC RX REV CODE- 250/636

## 2019-02-18 PROCEDURE — C1725 CATH, TRANSLUMIN NON-LASER: HCPCS | Performed by: SURGERY

## 2019-02-18 PROCEDURE — 80048 BASIC METABOLIC PNL TOTAL CA: CPT

## 2019-02-18 PROCEDURE — 99153 MOD SED SAME PHYS/QHP EA: CPT | Performed by: SURGERY

## 2019-02-18 PROCEDURE — 77030028837 HC SYR ANGI PWR INJ COEU -A: Performed by: SURGERY

## 2019-02-18 PROCEDURE — 85610 PROTHROMBIN TIME: CPT

## 2019-02-18 PROCEDURE — 37224 HC PTA FEMPOP UNI: CPT | Performed by: SURGERY

## 2019-02-18 PROCEDURE — 75710 ARTERY X-RAYS ARM/LEG: CPT | Performed by: SURGERY

## 2019-02-18 PROCEDURE — C1760 CLOSURE DEV, VASC: HCPCS | Performed by: SURGERY

## 2019-02-18 PROCEDURE — 74011636320 HC RX REV CODE- 636/320: Performed by: SURGERY

## 2019-02-18 PROCEDURE — 77030016703 HC CATH ANGI DX NYLX1 CARD -B: Performed by: SURGERY

## 2019-02-18 PROCEDURE — 77030013516 HC DEV INFL ANGI MRTM -B: Performed by: SURGERY

## 2019-02-18 PROCEDURE — 74011000250 HC RX REV CODE- 250: Performed by: SURGERY

## 2019-02-18 PROCEDURE — 74011250636 HC RX REV CODE- 250/636: Performed by: SURGERY

## 2019-02-18 PROCEDURE — 85027 COMPLETE CBC AUTOMATED: CPT

## 2019-02-18 PROCEDURE — C1894 INTRO/SHEATH, NON-LASER: HCPCS | Performed by: SURGERY

## 2019-02-18 PROCEDURE — C1887 CATHETER, GUIDING: HCPCS | Performed by: SURGERY

## 2019-02-18 PROCEDURE — 77030008584 HC TOOL GDWRE DEV TERU -A: Performed by: SURGERY

## 2019-02-18 PROCEDURE — 37221 HC PLC STNT ILIAC +/- PTA INIT: CPT | Performed by: SURGERY

## 2019-02-18 PROCEDURE — 3331090002 HH PPS REVENUE DEBIT

## 2019-02-18 PROCEDURE — 77030012597: Performed by: SURGERY

## 2019-02-18 PROCEDURE — 75625 CONTRAST EXAM ABDOMINL AORTA: CPT | Performed by: SURGERY

## 2019-02-18 DEVICE — STENT PRB35-07-060-080 PROTEGE EF V07
Type: IMPLANTABLE DEVICE | Status: FUNCTIONAL
Brand: EVERFLEX™ PROTÉGÉ™ EVERFLEX™

## 2019-02-18 RX ORDER — HEPARIN SODIUM 200 [USP'U]/100ML
INJECTION, SOLUTION INTRAVENOUS
Status: COMPLETED | OUTPATIENT
Start: 2019-02-18 | End: 2019-02-18

## 2019-02-18 RX ORDER — SODIUM CHLORIDE 9 MG/ML
75 INJECTION, SOLUTION INTRAVENOUS CONTINUOUS
Status: DISCONTINUED | OUTPATIENT
Start: 2019-02-18 | End: 2019-02-18 | Stop reason: HOSPADM

## 2019-02-18 RX ORDER — CLOPIDOGREL BISULFATE 75 MG/1
150 TABLET ORAL ONCE
Status: COMPLETED | OUTPATIENT
Start: 2019-02-18 | End: 2019-02-18

## 2019-02-18 RX ORDER — CHOLECALCIFEROL (VITAMIN D3) 125 MCG
CAPSULE ORAL
COMMUNITY

## 2019-02-18 RX ORDER — LABETALOL HCL 20 MG/4 ML
5-15 SYRINGE (ML) INTRAVENOUS
Status: DISCONTINUED | OUTPATIENT
Start: 2019-02-18 | End: 2019-02-18 | Stop reason: HOSPADM

## 2019-02-18 RX ORDER — FENTANYL CITRATE 50 UG/ML
INJECTION, SOLUTION INTRAMUSCULAR; INTRAVENOUS AS NEEDED
Status: DISCONTINUED | OUTPATIENT
Start: 2019-02-18 | End: 2019-02-18 | Stop reason: HOSPADM

## 2019-02-18 RX ORDER — HEPARIN SODIUM 1000 [USP'U]/ML
INJECTION, SOLUTION INTRAVENOUS; SUBCUTANEOUS AS NEEDED
Status: DISCONTINUED | OUTPATIENT
Start: 2019-02-18 | End: 2019-02-18 | Stop reason: HOSPADM

## 2019-02-18 RX ORDER — LIDOCAINE HYDROCHLORIDE 10 MG/ML
INJECTION INFILTRATION; PERINEURAL AS NEEDED
Status: DISCONTINUED | OUTPATIENT
Start: 2019-02-18 | End: 2019-02-18 | Stop reason: HOSPADM

## 2019-02-18 RX ORDER — SODIUM CHLORIDE 9 MG/ML
20 INJECTION, SOLUTION INTRAVENOUS CONTINUOUS
Status: DISCONTINUED | OUTPATIENT
Start: 2019-02-18 | End: 2019-02-18 | Stop reason: HOSPADM

## 2019-02-18 RX ORDER — MIDAZOLAM HYDROCHLORIDE 1 MG/ML
INJECTION, SOLUTION INTRAMUSCULAR; INTRAVENOUS AS NEEDED
Status: DISCONTINUED | OUTPATIENT
Start: 2019-02-18 | End: 2019-02-18 | Stop reason: HOSPADM

## 2019-02-18 RX ORDER — CEFAZOLIN SODIUM 1 G/3ML
INJECTION, POWDER, FOR SOLUTION INTRAMUSCULAR; INTRAVENOUS AS NEEDED
Status: DISCONTINUED | OUTPATIENT
Start: 2019-02-18 | End: 2019-02-18 | Stop reason: HOSPADM

## 2019-02-18 RX ORDER — LABETALOL HYDROCHLORIDE 5 MG/ML
INJECTION, SOLUTION INTRAVENOUS AS NEEDED
Status: DISCONTINUED | OUTPATIENT
Start: 2019-02-18 | End: 2019-02-18 | Stop reason: HOSPADM

## 2019-02-18 RX ORDER — HYDROCODONE BITARTRATE AND ACETAMINOPHEN 5; 325 MG/1; MG/1
1-2 TABLET ORAL
Status: DISCONTINUED | OUTPATIENT
Start: 2019-02-18 | End: 2019-02-18 | Stop reason: HOSPADM

## 2019-02-18 RX ADMIN — SODIUM CHLORIDE 20 ML/HR: 900 INJECTION, SOLUTION INTRAVENOUS at 10:56

## 2019-02-18 RX ADMIN — CLOPIDOGREL BISULFATE 150 MG: 75 TABLET ORAL at 15:50

## 2019-02-18 NOTE — PERIOP NOTES
Checked in with the patient, patient comfortable and family at bedside. [facility], from cath lab to get a updated on the delay. Jodi Amaro said she will come and speak to the patient and the family

## 2019-02-18 NOTE — PROGRESS NOTES
[address] [facility] IN REPORT: 
 
Telephone report received from Sheyla King (name) on Chana Simmons  being received from cath lab (unit) for routine post - op Report consisted of patients Situation, Background, Assessment and  
Recommendations(SBAR). Information from the following report(s) SBAR, Procedure Summary, MAR and Cardiac Rhythm NSR was reviewed with the receiving nurse. Opportunity for questions and clarification was provided. 1530 Pt received via stretcher from cath lab post vascular angiogram. Pt is alert and denies pain. Dressing to right femoral artery puncture is clean, dry, and intact. No bleeding or hematoma present. VS stable. Pt has right AKA. Will continue to monitor per IVCU protocol. Plan to discharge to home after recovery. Awaiting MD bedrest orders. 65 . [facility] at the bedside to assess pt. MD stated he has spoken with pt wife and daughter in the waiting room of procedure findings and follow up.  
 
1550 Administered PO plavix mixed in pudding. Pt tolerated well. Provided pt with lunch box meal.  
 
1624 Pt voided 300 ml of clear yellow urine in urinal.  
 
1700 Spoke with pt daughter, [address] [facility], and provided update on pt status and discharge time of 1900. She stated that she will set up transport with cab company. Pt to be transported back to [facility]. Discharge instructions reviewed with pt . Written copy of discharge instructions provided to pt for him and facility. 1830 Bedrest complete. Pt allowed out of bed to wheelchair. Pt went to bathroom to have bowel movement. 1851 Pt denies pain, shortness of breath, nausea, or dizziness. No bleeding or hematoma at right groin site. Pt dressed. Pt escorted to waiting area to be received by daughter and wife. All are taking a cab to rehab center. Pt left in stable condition.

## 2019-02-18 NOTE — Clinical Note
TRANSFER - OUT REPORT:  
 
Verbal report given to: Grover Brown RN in PACU. Report consisted of patient's Situation, Background, Assessment and  
Recommendations(SBAR). Opportunity for questions and clarification was provided. Patient transported with a Cardiac Cath Tech / Patient Care Tech. Patient transported to: PACU.

## 2019-02-18 NOTE — Clinical Note
Power injection to the AO, artery. Single view taken. PSI = 1000. Rate of rise = 0 sec. Injection rate = 15 mL/sec. Total injected volume = 25 mL.

## 2019-02-18 NOTE — Clinical Note
Right femoral artery. Accessed successfully. using fluoro and ultrasound guidance.  Number of attempts =  1.

## 2019-02-18 NOTE — Clinical Note
TRANSFER - IN REPORT:  
 
Verbal report received from: Waleska Danny [facility] RN. Report consisted of patient's Situation, Background, Assessment and  
Recommendations(SBAR). Opportunity for questions and clarification was provided. Assessment completed upon patient's arrival to unit and care assumed. Patient transported with a Cardiac Cath Tech / Patient Care Tech.

## 2019-02-18 NOTE — DISCHARGE INSTRUCTIONS
Vascular Angiography Discharge Instructions    *Check the puncture site frequently for swelling or bleeding. If you see any bleeding, lie down and apply pressure over the area for 15 minutes. If bleeding does not stop, continue to hold pressure and have someone call 911. Notify your doctor for any redness, swelling, drainage or oozing from the puncture site. Notify your doctor for any fever or chills. *If the leg with the puncture becomes cold, numb or painful, call Dr Uche Lawson. *Activity should be limited for the next 48 hours. Climb stairs as little as possible and avoid any stooping, bending or strenuous activity for 48 hours. No heavy lifting, pushing, or pulling (anything over 10 pounds) for 7 days. *Do not drive. *You may resume your usual diet. Drink more fluids than usual.    *Have a responsible person drive you home and stay with you for at least 24 hours after your angiography. *You may remove the bandage from your right groin in 24 hours. You may shower in 24 hours. No tub baths, hot tubs or swimming for one week. Do not place any lotions, creams, powders, ointments over the puncture site for one week. You may place a clean band-aid over the puncture site each day for 5 days. Change this daily. DISCHARGE SUMMARY from Nurse    PATIENT INSTRUCTIONS:    After general anesthesia or intravenous sedation, for 24 hours or while taking prescription Narcotics:  · Limit your activities  · Do not drive and operate hazardous machinery  · Do not make important personal or business decisions  · Do  not drink alcoholic beverages  · If you have not urinated within 8 hours after discharge, please contact your surgeon on call.     Report the following to your surgeon:  · Excessive pain, swelling, redness or odor of or around the surgical area  · Temperature over 100.5  · Nausea and vomiting lasting longer than 4 hours or if unable to take medications  · Any signs of decreased circulation or nerve impairment to extremity: change in color, persistent  numbness, tingling, coldness or increase pain  · Any questions    What to do at Home:  Recommended activity: Activity as tolerated and No heavy lifting, pushing, or pulling for 1 week, no greater than 10 pounds. If you experience any of the following symptoms pain, swelling, or bleeding at the right groin puncture site, please follow up with 911/emergency room. *  Please give a list of your current medications to your Primary Care Provider. *  Please update this list whenever your medications are discontinued, doses are      changed, or new medications (including over-the-counter products) are added. *  Please carry medication information at all times in case of emergency situations. These are general instructions for a healthy lifestyle:    No smoking/ No tobacco products/ Avoid exposure to second hand smoke  Surgeon General's Warning:  Quitting smoking now greatly reduces serious risk to your health. Obesity, smoking, and sedentary lifestyle greatly increases your risk for illness    A healthy diet, regular physical exercise & weight monitoring are important for maintaining a healthy lifestyle    You may be retaining fluid if you have a history of heart failure or if you experience any of the following symptoms:  Weight gain of 3 pounds or more overnight or 5 pounds in a week, increased swelling in our hands or feet or shortness of breath while lying flat in bed. Please call your doctor as soon as you notice any of these symptoms; do not wait until your next office visit. Recognize signs and symptoms of STROKE:    F-face looks uneven    A-arms unable to move or move unevenly    S-speech slurred or non-existent    T-time-call 911 as soon as signs and symptoms begin-DO NOT go       Back to bed or wait to see if you get better-TIME IS BRAIN.     Warning Signs of HEART ATTACK     Call 911 if you have these symptoms:   Chest discomfort. Most heart attacks involve discomfort in the center of the chest that lasts more than a few minutes, or that goes away and comes back. It can feel like uncomfortable pressure, squeezing, fullness, or pain.  Discomfort in other areas of the upper body. Symptoms can include pain or discomfort in one or both arms, the back, neck, jaw, or stomach.  Shortness of breath with or without chest discomfort.  Other signs may include breaking out in a cold sweat, nausea, or lightheadedness. Don't wait more than five minutes to call 911 - MINUTES MATTER! Fast action can save your life. Calling 911 is almost always the fastest way to get lifesaving treatment. Emergency Medical Services staff can begin treatment when they arrive -- up to an hour sooner than if someone gets to the hospital by car. The discharge information has been reviewed with the patient. The patient verbalized understanding. Discharge medications reviewed with the patient and appropriate educational materials and side effects teaching were provided.     Patient armband removed and shredded

## 2019-02-18 NOTE — Clinical Note
Peripheral Lesion 1. Pressure = 10 nasreen; Duration = 54 sec. Pressure = 10 nasreen; Duration = 66 sec.

## 2019-02-18 NOTE — Clinical Note
Power injection to the AO, artery. Single view taken. PSI = 1000. Rate of rise = 0 sec. Injection rate = 10 mL/sec. Total injected volume = 20 mL.

## 2019-02-18 NOTE — PERIOP NOTES
Pre-Op Summary Pt arrived via car with family/friend- CAB and is oriented to time, place, person and situation. Patient with unsteady gait with wheelchair assistive devices. Visit Vitals /44 (BP 1 Location: Left arm, BP Patient Position: At rest) Pulse 68 Temp 98.1 °F (36.7 °C) Resp 16 Ht 5' 7\" (1.702 m) Wt 57.6 kg (127 lb) SpO2 99% BMI 19.89 kg/m² Peripheral IV located on Left antecubital . Patients belongings are located with family. Patient's point of contact is Gera Simmons, wife- Alisa Roldan and their contact number is: [phone] They will be in the waiting room. They are able to receive medication information. They will be their ride home. - transport=- [id number]

## 2019-02-18 NOTE — PROCEDURES
BRIEF PROCEDURE  NOTE    Date of Procedure: 2/18/2019   Preoperative Diagnosis: PVD (peripheral vascular disease) ([facility] [I73.9] WITH POORLY HEALING L HEEL ESCHAR. Postoperative Diagnosis: LLE PVD WITH POORLY HEALING LEFT HEEL ESCHAR, SEVERE DISTAL LEFT EXT ILIAC ART STENOSIS AND SEVERE AK POP ART STENOSIS. Procedure: AORTOGRAM WITH LEFT LOWER EXTREMITY RUNOFF ANGIOGRAM, LEFT AK POP 4X40MM PTA (8CM TREATMENT), DISTAL LEFT EXTERNAL ILIAC 7X60MM SELF EXP STENT WITH 6X60MM ANGIOPLASTY. Surgeon(s) and Role:     * Radha Mcnally MD - Primary  Anesthesia: mod sed - versed 2mg, fentanyl 100mcg IV; local - 10ml 1% lidocaine  Estimated Blood Loss: minimal < 10ml  Fluids: saline  Pre Procedure Antibiotic: ancef 2 gram IV  Contrast: 97ml isovue  Specimens: none  Heparin: 4000 units IV  Labetalol 20mg IV  Findings: 1. Patent b/l single RA, patent ADITYA, patent aorta, prox L Com iliac ~30% stenosis (2cm), patent L prox ext iliac art stent, distal L ext iliac 75% stenosis for ~1.5cm, patent R com/ext iliac < 30% stenosis. 2. LLE: patent L CFA, occluded L profunda, L SFA patent small thru out, several areas of ~30% stenosis, adductor canal ~40%, ak pop 60-70% stenosis two short areas. Bk pop patent, patent 3vessel tibial runoff, L AT divides into collaterals at the ankle joint w/o a DP artery, L PT to the foot. 3. After L AK pop 4x40mm angioplasty x2 (8cm) minimal residual stenosis. AFter L distal external iliac 7x60mm self exp stent nested into prior stent ~1.5cm after 6mm angioplasty no residual stenosis. Complications: none  Implants:   Implant Name Type Inv. Item Serial No.  Lot No. LRB No. Used Action   Everflex 7mm x 60mm x 80cm     [id number] N/A 1 Implanted         Corby Jim [address]  February 18, 2019  3:24 PM

## 2019-02-18 NOTE — Clinical Note
Peripheral Lesion 1. Balloon inflated using single inflation technique. Pressure = 10 nasreen; Duration = 16 sec.

## 2019-02-19 PROCEDURE — 3331090002 HH PPS REVENUE DEBIT

## 2019-02-19 PROCEDURE — 3331090001 HH PPS REVENUE CREDIT

## 2019-02-20 PROCEDURE — 3331090002 HH PPS REVENUE DEBIT

## 2019-02-20 PROCEDURE — 3331090001 HH PPS REVENUE CREDIT

## 2019-02-21 PROCEDURE — 3331090001 HH PPS REVENUE CREDIT

## 2019-02-21 PROCEDURE — 3331090002 HH PPS REVENUE DEBIT

## 2019-02-22 PROCEDURE — 3331090002 HH PPS REVENUE DEBIT

## 2019-02-22 PROCEDURE — 3331090001 HH PPS REVENUE CREDIT

## 2019-02-23 PROCEDURE — 3331090001 HH PPS REVENUE CREDIT

## 2019-02-23 PROCEDURE — 3331090002 HH PPS REVENUE DEBIT

## 2019-02-24 PROCEDURE — 3331090001 HH PPS REVENUE CREDIT

## 2019-02-24 PROCEDURE — 3331090002 HH PPS REVENUE DEBIT

## 2019-02-25 PROCEDURE — 3331090002 HH PPS REVENUE DEBIT

## 2019-02-25 PROCEDURE — 3331090001 HH PPS REVENUE CREDIT

## 2019-02-26 PROCEDURE — 3331090002 HH PPS REVENUE DEBIT

## 2019-02-26 PROCEDURE — 3331090001 HH PPS REVENUE CREDIT

## 2019-02-27 PROCEDURE — 3331090002 HH PPS REVENUE DEBIT

## 2019-02-27 PROCEDURE — 3331090001 HH PPS REVENUE CREDIT

## 2019-02-28 PROCEDURE — 3331090002 HH PPS REVENUE DEBIT

## 2019-02-28 PROCEDURE — 3331090001 HH PPS REVENUE CREDIT

## 2019-03-01 PROCEDURE — 3331090002 HH PPS REVENUE DEBIT

## 2019-03-01 PROCEDURE — 3331090001 HH PPS REVENUE CREDIT

## 2019-03-02 PROCEDURE — 3331090001 HH PPS REVENUE CREDIT

## 2019-03-02 PROCEDURE — 3331090002 HH PPS REVENUE DEBIT

## 2019-03-03 PROCEDURE — 3331090001 HH PPS REVENUE CREDIT

## 2019-03-03 PROCEDURE — 3331090002 HH PPS REVENUE DEBIT

## 2019-03-04 ENCOUNTER — HOME CARE VISIT (OUTPATIENT)
Dept: HOME HEALTH SERVICES | Facility: HOME HEALTH | Age: 74
End: 2019-03-04
Payer: MEDICARE

## 2021-05-15 NOTE — PROCEDURES
1939 -  Head to toe assessment performed at this time. Pt denies any chest pain or SOB. Pt denies any numbness or tingling to extremities. Pt encouraged to manage pain and to use the incentive spirometer. Pt educated on the side effects of medications taken. Pt left with call light within reach and bed in low position. Will continue to monitor. [facility]  PROCEDURE NOTE    Name:  Analy Avery  MR#:   [id number]  :  1945  ACCOUNT #:  [de-identified]  DATE OF SERVICE:  2019    PREOPERATIVE DIAGNOSIS:  Peripheral vascular disease with poorly healing left heel  eschar. POSTOPERATIVE DIAGNOSES:  Left lower extremity peripheral vascular disease with  poorly healing left heel eschar with severe distal left external iliac artery  stenosis, severe above-knee popliteal artery stenosis. PROCEDURE PERFORMED:  The aortogram of left lower extremity with runoff angiogram.   The left above-knee popliteal artery 4 x 40 mm angioplasty, total length treated 8  cm. Distal left external iliac self-expanding stent 7 x 60 mm with a 6 x 60 mm  angioplasty. SURGEON:  Leticia Sosa MD    ASSISTANT:  Surgical  is none. ANESTHESIA:  Moderate sedation with Versed 2 mg and fentanyl 100 mcg. LOCAL ANESTHESIA:  10 mL of 1% lidocaine. COMPLICATIONS:  None. ESTIMATED BLOOD LOSS:  Minimal, less than 10 mL. FLUIDS:  Saline. PREOPERATIVE ANTIBIOTICS:  Ancef 2 g IV. CONTRAST:  Isovue 97 mL. SPECIMENS REMOVED:  None. HEPARIN:  4000 units IV. LABETALOL:  20 mg IV. IMPLANTS:  EverFlex 7 mm x 60 mm self-expanding stent. FINDINGS:  1. Patent bilateral single renal arteries. Patent inferior mesenteric artery. Patent aorta. Proximal left common iliac artery, approximately 30% stenosis for 2  cm. Patent left proximal external iliac artery stent. The distal left external  iliac artery with approximately 75% stenosis for 1.5 cm. Patent right common and  external iliac arteries with less than 30% stenosis. 2.  Left lower extremity:  Patent left common femoral artery. Occluded left profunda  femoris artery. Left SFA was patent, but small throughout with several areas of  approximately 30% stenosis. At the adductor canal, there was approximately 40%  stenosis.   The above-knee popliteal artery has 60%-70% stenosis in 2 short areas. Below-knee popliteal artery was patent. There was patent 3-vessel tibial runoff to  the foot. The left anterior tibial artery divides into collaterals at the ankle  joint without any identified dorsalis pedis artery. The left posterior tibial artery  was patent through the foot, similar ton towards the metatarsal level. 3.  Excellent left above knee popliteal artery 4 x 40 mm angioplasty x2 for a total  length treated approximately 8 cm. There was minimal residual stenosis. Excellent  left distal external iliac artery 7 x 60 mm self-expanding stent placement was nested  into the previously placed existing stent for approximately 1.5 cm and after 6 mm  angioplasty, there was no residual stenosis. PROCEDURE IN DETAIL:  After consent was obtained, the patient was taken to the angio  suite, where the groins were prepped with ChloraPrep, allowed to dry and then draped  appropriately and the procedure time-out was performed. Using a sterile ultrasound  guidance, the right common femoral artery was identified. Skin was anesthetized with  1% lidocaine and the artery was accessed under direct vision with the micropuncture  needle after local anesthesia. A 0.018-inch wire was passed followed by a stiffened  micropuncture catheter and Amplatz wire was then advanced into the aorta. A 5-Greenlandic  sheath was then placed. A universal flush catheter was centered over the L1-L2  interspace and an aortogram was performed. The catheter was just brought down to the aortic bifurcation and the left common  iliac artery was accessed with the flush catheter over the Glidewire and then  sequentially advanced down to the common femoral artery, where a staged left lower  extremity angiogram was then performed. Amplatz wire was then replaced.   Flush  catheter was removed and a 45-cm 6-Greenlandic Balkin sheath was then placed to the mid  left common iliac artery and 4000 units of heparin were given.  A TrailBlazer  catheter was advanced sequentially over a Glidewire to the below-knee popliteal  artery and then removed. Using roadmapping technique, we identified the stenosis of  the above-knee popliteal artery and marked the screen. At this point, we  angioplastied at 2 levels with a 4 x 40 mm balloon with 2 inflations up to 10  atmospheres. Patient's angiogram revealed minimal residual stenosis and improved  flow. The wire was then withdrawn to the upper SFA. Again, using a roadmapping  technique, the stenosis in the distal external iliac artery was identified. We then  placed a 7 x 60 mm stent down to the inguinal ligament and nested into the existing  stent and then angioplasty with 6 x 60 mm balloon to 10 atmospheres. Patient's  angiogram was completed and there was no residual stenosis in the new stent. A long  6-Cameroonian sheath was then exchanged over an Amplatz wire for a 11 cm 6-Cameroonian sheath. As the sheath was removed, a 6-Cameroonian Exoseal closure device was deployed. Pressure  was held for 10 minutes. A gauze and Tegaderm dressing was placed and there was no  evidence of hematoma or bleeding.         Lorrie Dominguez MD      [id number]  D:  02/18/2019 21:30  T:  02/19/2019 5:50  JOB #:  [id number]  CC:  [address] [place] [address] [facility]

## 2022-05-16 NOTE — PROGRESS NOTES
[address] [place], [place] [address] Daily progress Note Patient: Esau Rosa MRN: [id number]  CSN: [id number] YOB: 1945  Age: 74 y.o. Sex: male DOA: 12/26/2018 LOS:  LOS: 7 days Assessment/Plan Principal Problem: 
  Status post arterial stent (12/26/2018) Active Problems: 
  High cholesterol (12/26/2018) Hypertension (12/26/2018) Peripheral arterial disease ([facility] [address] (12/26/2018) COPD (chronic obstructive pulmonary disease) ([facility] [address] (12/26/2018) Insomnia (12/26/2018) Postprocedural hypotension (1/1/2019) - Diet and mobilization per primary team 
- Pain control PRN 
- PT/OT 
- Cont heparin gtt - Hypotension - improved. unclear etiology, Hgb stable; dopamine stopped overnight, losartan d/c'd. - RENUKA drain d/c'd. vascular surgery team to consider holding on repeat bypass, decision to be based on clinical findings next several days, ambulation.  
- Urinary retention w/ h/o BPH. Ramirez removed, flomax started, monitor - Cont acceptable home medications for chronic conditions  
- DVT protocol: heparin sq restarted 
  
I have personally reviewed all pertinent labs and films that have officially resulted over the last 24 hours. I have personally checked for all pending labs that are awaiting final results. Subjective / Interval History:  
 
Off dopamine since last night, BP stable. RENUKA drain removed. Pt denies complaints, walked around with PT. Moved out of ICU. Objective:  
  
 
Visit Vitals /54 Pulse 79 Temp 98.4 °F (36.9 °C) Resp 16 Ht 5' 8\" (1.727 m) Wt 61.2 kg (134 lb 14.7 oz) SpO2 90% BMI 20.51 kg/m² Physical Exam: 
Visit Vitals /54 Pulse 79 Temp 98.4 °F (36.9 °C) Resp 16 Ht 5' 8\" (1.727 m) Wt 61.2 kg (134 lb 14.7 oz) SpO2 90% BMI 20.51 kg/m² General appearance: alert, cooperative, no distress, appears stated age HEENT: negative Neck: supple, symmetrical, trachea midline, no adenopathy, thyroid: not enlarged, symmetric, no tenderness/mass/nodules, no carotid bruit and no JVD Lungs: clear to auscultation bilaterally Heart: regular rate and rhythm, S1, S2 normal, no murmur, click, rub or gallop Abdomen: soft, non-tender. Bowel sounds normal. No masses,  no organomegaly Skin: skin discoloration R leg & foot - dusky & bluish Ext: R foot warm, serous drainage in RENUKA Intake and Output: 
Current Shift:  01/02 0701 - 01/02 1900 In: 46 [P.O.:340; I.V.:125] Out: 440 [Urine:400; Drains:40] Last three shifts:  12/31 1901 - 01/02 0700 In: 2062.4 [I.V.:2062.4] Out: 5946 [Urine:3180; Drains:37] Recent Results (from the past 24 hour(s)) CBC WITH AUTOMATED DIFF Collection Time: 01/02/19  3:00 AM  
Result Value Ref Range WBC 9.2 4.6 - 13.2 K/uL  
 RBC 3.19 (L) 4.70 - 5.50 M/uL HGB 9.5 (L) 13.0 - 16.0 g/dL HCT 29.8 (L) 36.0 - 48.0 % MCV 93.4 74.0 - 97.0 FL  
 MCH 29.8 24.0 - 34.0 PG  
 MCHC 31.9 31.0 - 37.0 g/dL  
 RDW 14.1 11.6 - 14.5 % PLATELET 601 979 - 049 K/uL MPV 11.2 9.2 - 11.8 FL  
 NEUTROPHILS 73 40 - 73 % LYMPHOCYTES 15 (L) 21 - 52 % MONOCYTES 10 3 - 10 % EOSINOPHILS 2 0 - 5 % BASOPHILS 0 0 - 2 %  
 ABS. NEUTROPHILS 6.8 1.8 - 8.0 K/UL  
 ABS. LYMPHOCYTES 1.4 0.9 - 3.6 K/UL  
 ABS. MONOCYTES 0.9 0.05 - 1.2 K/UL  
 ABS. EOSINOPHILS 0.1 0.0 - 0.4 K/UL  
 ABS. BASOPHILS 0.0 0.0 - 0.1 K/UL  
 DF AUTOMATED METABOLIC PANEL, BASIC Collection Time: 01/02/19  3:00 AM  
Result Value Ref Range Sodium 137 136 - 145 mmol/L Potassium 3.9 3.5 - 5.5 mmol/L Chloride 105 100 - 108 mmol/L  
 CO2 28 21 - 32 mmol/L Anion gap 4 3.0 - 18 mmol/L Glucose 119 (H) 74 - 99 mg/dL BUN 32 (H) 7.0 - 18 MG/DL Creatinine 0.83 0.6 - 1.3 MG/DL  
 BUN/Creatinine ratio 39 (H) 12 - 20 GFR est AA >60 >60 ml/min/1.73m2 GFR est non-AA >60 >60 ml/min/1.73m2  Calcium 8.2 (L) 8.5 - 10.1 MG/DL  
 
 
 
 Current Facility-Administered Medications:  
  tamsulosin (FLOMAX) capsule 0.4 mg, 0.4 mg, Oral, DAILY, Trav Perez MD, 0.4 mg at 01/02/19 1025 
  oxyCODONE-acetaminophen (PERCOCET) 5-325 mg per tablet 2 Tab, 2 Tab, Oral, Q4H PRN, Johnathon Yepez MD 
  gabapentin (NEURONTIN) capsule 100 mg, 100 mg, Oral, TID, Johnathon Yepez MD, 100 mg at 01/02/19 1515   heparin (porcine) injection 5,000 Units, 5,000 Units, SubCUTAneous, Q8H, Trav Perez MD, 5,000 Units at 01/02/19 1300 
  oxyCODONE-acetaminophen (PERCOCET) 5-325 mg per tablet 1 Tab, 1 Tab, Oral, Q4H PRN, Marisabel Taylor MD 
  famotidine (PEPCID) tablet 20 mg, 20 mg, Oral, BID, Marisabel Taylor MD, 20 mg at 01/02/19 0827 
  haloperidol lactate (HALDOL) injection 2 mg, 2 mg, IntraVENous, Q2H PRN, Ness Robles DO, 2 mg at 12/30/18 9129   aspirin chewable tablet 81 mg, 81 mg, Oral, DAILY, Oli Tapia MD, 81 mg at 01/02/19 0827 
  zolpidem (AMBIEN) tablet 5 mg, 5 mg, Oral, QHS PRN, Oli Tapia MD, 5 mg at 01/01/19 2105   melatonin tablet 1.5 mg, 1.5 mg, Oral, QHS PRN, Oli Tapia MD 
  0.9% sodium chloride infusion 250 mL, 250 mL, IntraVENous, PRN, Messi Mccallum MD 
  sodium chloride (NS) flush 5-10 mL, 5-10 mL, IntraVENous, Q8H, Laurita Taylor MD, 10 mL at 01/02/19 1400 
  sodium chloride (NS) flush 5-10 mL, 5-10 mL, IntraVENous, PRN, Marisabel Taylor MD, 10 mL at 12/28/18 2109   acetaminophen (TYLENOL) tablet 650 mg, 650 mg, Oral, Q4H PRN, Marisabel Taylor MD 
  naloxone [facility]) injection 0.4 mg, 0.4 mg, IntraVENous, PRN, Marisabel Taylor MD 
  ondansetron (ZOFRAN) injection 4 mg, 4 mg, IntraVENous, Q4H PRN, Marisabel Taylor MD 
  diazePAM (VALIUM) tablet 5 mg, 5 mg, Oral, Q8H PRN, Kaitlyn RO DO 
  rosuvastatin (CRESTOR) tablet 40 mg, 40 mg, Oral, QHS, Carollee Eye, Salvador RO DO, 40 mg at 01/01/19 2105   budesonide (PULMICORT) 500 mcg/2 ml nebulizer suspension, 500 mcg, Nebulization, BID RT, Kike Gibbs DO, 500 mcg at 01/01/19 2035   arformoterol (BROVANA) neb solution 15 mcg, 15 mcg, Nebulization, BID RT, Kike Gibbs DO, 15 mcg at 01/01/19 2035 Lab Results Component Value Date/Time  Glucose 119 (H) 01/02/2019 03:00 AM  
 Glucose 138 (H) 01/01/2019 02:40 AM  
 Glucose 118 (H) 12/29/2018 01:15 AM  
 Glucose 89 12/26/2018 02:05 PM  
 Glucose 90 05/19/2015 12:49 PM  
  
 
 
 
 
 
 
Karan Davies MD 
1/2/2019, 4:42 PM 
 
 
 
 
 PAST SURGICAL HISTORY:  No significant past surgical history

## 2023-01-24 NOTE — PROGRESS NOTES
Assumed patient care. Received patient awake, alert, oriented X4. Patient complained of pain on right knee 8/10. Med to be given. Bed is locked and in lowest position and call bell is within reach. Not  In acute distress. done

## 2024-07-20 NOTE — ROUTINE PROCESS
1430. Pt arrived unit via bed from PACU. Pt in no apparent distress. Able to make needs known. Pulses with doppler in groin only. Right lower extremity discolored and cold to touch. Arterial line in place. Ramirez draining to gravity with clear yellow urine. Oriented to call bell and surrounding. Call bell in reach. 1455 Family at bedside. 1540 Partial bath complete. Pt tolerate well. Open area noted in gluteal fold. Care performed. Mepilex applied. [address] Mosie Hashimoto paged regarding diet order. Awaiting call back. 1555 Call back received to resume diet. 1743 Specialty bed ordered. Spoke to [place]. Work order number [id number] Bedside shift change report given to  KEO TOLLIVER RN (oncoming nurse) by Son Gregory RN (offgoing nurse). Report included the following information SBAR, MAR, KARDEX AND RECENT RESULTS. Arielle Torres
Bedside and Verbal shift change report given to Patrica Lozoya RN (oncoming nurse) by Zully Ambrose RN (offgoing nurse).  Report included the following information SBAR, Kardex and MAR.  
  
 
 

In chart for charge rounding
Regular rate and rhythm, Heart sounds S1 S2 present, no murmurs, rubs or gallops

## 2024-12-04 NOTE — BRIEF OP NOTE
BRIEF OPERATIVE NOTE Date of Procedure: 12/31/2018 Preoperative Diagnosis: Right leg fem to bk pop vein bypass occluded. Postoperative Diagnosis: Right leg fem to BK pop vein bypass occlusion with distal vein disruption and severe stenosis near the distal anastomosis. Procedure(s): RIGHT FEMORAL TO POPLITEAL ARTERY VEIN BYPASS WITH OPEN MID THIGH THOMBECTOMY, AND DISTAL R BK POP ANASTOMOTIC EXPLORATION. Surgeon(s) and Role: * Jose Alberto Ramirez MD - Primary Surgical Assistant: NONE Surgical Staff: 
Circ-1: Debby Dixon RN 
Circ-2: Lynn Scott RN Surg Asst-1: Nasim Fischer Event Time In Time Out Incision Start 12/31/2018 1500 Incision Close 12/31/2018 1635 Anesthesia: General / Jaydon Sunil Estimated Blood Loss: 100 Ml Fluids; 300ml saline Dopamine 7.5mcg/kg/min URine output: 225 ml Heparin: 6000 units IV 
DRains: distal incision 10mm RENUKA flat drain Specimens: none Findings: fully thrombosed R fem to BK pop vein bypass, ~2cm from distal anastomosis posterior vein wall 1.5cm longitudinal wall separation, two more proximal openings and a 1-1.5mm lumen at the heel of the anastomosis, Complications: none Implants: none Show Aperture Variable?: Yes Consent: The patient's consent was obtained including but not limited to risks of crusting, scabbing, blistering, scarring, darker or lighter pigmentary change, recurrence, incomplete removal and infection. Detail Level: Detailed Render Post-Care Instructions In Note?: no Duration Of Freeze Thaw-Cycle (Seconds): 0 Post-Care Instructions: I reviewed with the patient in detail post-care instructions. Patient is to wear sunprotection, and avoid picking at any of the treated lesions. Pt may apply Vaseline to crusted or scabbing areas.

## (undated) DEVICE — RADIFOCUS GLIDEWIRE: Brand: GLIDEWIRE

## (undated) DEVICE — BANDAGE COMPR SGL LAYERED CLP CLSR ELAS 15FT LEN 6IN W

## (undated) DEVICE — CANISTER VAC 500MLW/O GEL 5PK -- ORDER AS CA

## (undated) DEVICE — DEPAUL UPPER EXTREMITY PACK: Brand: MEDLINE INDUSTRIES, INC.

## (undated) DEVICE — CATHETER PTCA 5FR L135CM BLLN L40MM DIA4MM 20ATM OTW LO

## (undated) DEVICE — TRAY PREP DRY W/ PREM GLV 2 APPL 6 SPNG 2 UNDPD 1 OVERWRAP

## (undated) DEVICE — ROCKER SWITCH PENCIL HOLSTER: Brand: VALLEYLAB

## (undated) DEVICE — SUTURE PROL SZ 7-0 L24IN NONABSORBABLE BLU BV-1 L9.3MM 3/8 8304H

## (undated) DEVICE — SUTURE VCRL SZ 3-0 L27IN ABSRB VLT L26MM SH 1/2 CIR J316H

## (undated) DEVICE — Device

## (undated) DEVICE — GUIDEWIRE VASC L150CM DIA0.035IN STR TIP PTFE FIX COR

## (undated) DEVICE — SUTURE PERMAHAND SZ 3-0 L18IN NONABSORBABLE BLK SILK BRAID A184H

## (undated) DEVICE — APPLICATOR BNDG 1MM ADH PREMIERPRO EXOFIN

## (undated) DEVICE — 3M™ TEGADERM™ TRANSPARENT FILM DRESSING FRAME STYLE, 1626W, 4 IN X 4-3/4 IN (10 CM X 12 CM), 50/CT 4CT/CASE: Brand: 3M™ TEGADERM™

## (undated) DEVICE — SYR 10ML CTRL LR LCK NSAF LF --

## (undated) DEVICE — SUT SLK 3-0 30IN SH BLK --

## (undated) DEVICE — SUTURE PROL SZ 5-0 L24IN NONABSORBABLE BLU L9.3MM BV-1 3/8 9702H

## (undated) DEVICE — KENDALL 500 SERIES DIAPHORETIC FOAM MONITORING ELECTRODE - TEAR DROP SHAPE ( 5/PK): Brand: KENDALL

## (undated) DEVICE — STERILE POLYISOPRENE POWDER-FREE SURGICAL GLOVES: Brand: PROTEXIS

## (undated) DEVICE — COVER US PRB W15XL120CM W/ GEL RUBBERBAND TAPE STRP FLD GEN

## (undated) DEVICE — SUTURE VCRL SZ 0 L18IN ABSRB UD L36MM CT-1 1/2 CIR J840D

## (undated) DEVICE — DEPAUL MAJOR PROCEDURE PACK: Brand: MEDLINE INDUSTRIES, INC.

## (undated) DEVICE — DRAPE THER FLUID WARMING 66X44 IN FLAT SLUSH DBL DISC ORS

## (undated) DEVICE — SUT PROL 6-0 24IN BV1 DA BLU --

## (undated) DEVICE — SPONGE GZ W2XL2IN 12 PLY 100% WVN COT PRE COUNTED

## (undated) DEVICE — GAUZE SPONGES,12 PLY: Brand: CURITY

## (undated) DEVICE — DEVICE VASC CLSR 5FR FEM ART INTVASC EXOSEAL

## (undated) DEVICE — INSULATED BLADE ELECTRODE: Brand: EDGE

## (undated) DEVICE — (D)PREP SKN CHLRAPRP APPL 26ML -- CONVERT TO ITEM 371833

## (undated) DEVICE — REM POLYHESIVE ADULT PATIENT RETURN ELECTRODE: Brand: VALLEYLAB

## (undated) DEVICE — DEVICE VASC CLSR 6FR W/ TWO VIS INDIC FOR FEM ART PUNC

## (undated) DEVICE — GDWIRE ANGIO SUP STF 0.035IN --

## (undated) DEVICE — SURGICAL PROCEDURE TRAY CUST ANGIO KT SUPPLEMENT

## (undated) DEVICE — RADIFOCUS TORQUE DEVICE MULTI-TORQUE VISE: Brand: RADIFOCUS TORQUE DEVICE

## (undated) DEVICE — CATH URETH FOL 2W SH 18FRX5ML --

## (undated) DEVICE — CATHETER ANGIO PIG 0.035 IN AD 5 FRX65 CM W/ SH NYLEX

## (undated) DEVICE — NDL PRT INJ NSAF BLNT 18GX1.5 --

## (undated) DEVICE — SPONGE GZ W4XL4IN COT 12 PLY TYP VII WVN C FLD DSGN

## (undated) DEVICE — SOLUTION IV 500ML 0.9% SOD CHL FLX CONT

## (undated) DEVICE — AMD ANTIMICROBIAL SUPER SPONGES,MEDIUM: Brand: KERLIX

## (undated) DEVICE — CATH SUPP SNGL 0.035INX135CM -- TRAILBLAZER - ORDER BY PK/5

## (undated) DEVICE — INTRODUCER SHTH 5FR CANN L11CM DIL TIP 25MM GRY TUNGSTEN

## (undated) DEVICE — SUTURE VCRL SZ 2-0 L27IN ABSRB UD L26MM SH 1/2 CIR J417H

## (undated) DEVICE — SUTURE PERMAHAND SZ 2-0 L12X18IN NONABSORBABLE BLK SILK A185H

## (undated) DEVICE — CLIP INT SM WIDE RED TI TRNSVRS GRV CHEVRON SHP W PRECIS

## (undated) DEVICE — FOGARTY ARTERIAL EMBOLECTOMY CATHETER 2F 60CM: Brand: FOGARTY

## (undated) DEVICE — COVER LT HNDL BLU PLAS

## (undated) DEVICE — (D)GLOVE SURG 8 PWD LTX -- DISC BY MFR USE ITEM 110052

## (undated) DEVICE — GOWN,AURORA,NONRNF,XL,30/CS: Brand: MEDLINE

## (undated) DEVICE — SET ANGIO L6.5IN L BOR 3 W STPCOCK SPIK TBNG

## (undated) DEVICE — SURGIFOAM SPNG SZ 100

## (undated) DEVICE — DRESSING,GAUZE,XEROFORM,CURAD,1"X8",ST: Brand: CURAD

## (undated) DEVICE — DEVICE INFL SYR BLLN ENDO 30 -- INTELLI

## (undated) DEVICE — MASTISOL ADHESIVE LIQ 2/3ML

## (undated) DEVICE — VESSEL LOOPS,MAXI, RED: Brand: DEVON

## (undated) DEVICE — STAPLER SKIN H3.9MM WIRE DIA0.58MM CRWN 6.9MM 35 STPL ROT

## (undated) DEVICE — HEX-LOCKING BLADE ELECTRODE: Brand: EDGE

## (undated) DEVICE — SUT SLK 2-0SH 30IN BLK --

## (undated) DEVICE — TUBING PRSS 48IN 1200PSI CLR HI PRSS INJ EXCITE FLX

## (undated) DEVICE — SUTURE VCRL SZ 3-0 L27IN ABSRB UD L26MM SH 1/2 CIR J416H

## (undated) DEVICE — MICROPUNCTURE INTRODUCER SET SILHOUETTE TRANSITIONLESS WITH STAINLESS STEEL WIRE GUIDE: Brand: MICROPUNCTURE

## (undated) DEVICE — SOLUTION IV 1000ML 0.9% SOD CHL

## (undated) DEVICE — BLADE SAW W0.98XL3.54IN THK0.05IN CUT THK0.05IN SAG

## (undated) DEVICE — HEAD CUT VEIN SLV VLVTOM 2- --

## (undated) DEVICE — GUIDEWIRE VASC L75CM DIA0.035IN TIP L7CM PTFE COAT S STL

## (undated) DEVICE — SPONGE LAP 18X18IN STRL -- 5/PK

## (undated) DEVICE — BANDAGE COMPR W4INXL5YD BGE COHESIVE SELF ADH ADBAN CBN1104] AVCOR HEALTHCARE PRODUCTS INC]

## (undated) DEVICE — SYRINGE TB 1ML NDL 25GA L0.625IN PLAS SLIP TIP CONVENTIONAL

## (undated) DEVICE — SWAB CULT SGL AMIES W/O CHAR FOR THRT VAG SKIN HRT CULTSWAB

## (undated) DEVICE — SYR 10ML LUER LOK 1/5ML GRAD --

## (undated) DEVICE — SKIN MARKER,REGULAR TIP WITH RULER AND LABELS: Brand: DEVON

## (undated) DEVICE — VESSEL LOOPS,MAXI, BLUE: Brand: DEVON

## (undated) DEVICE — SYRINGE 200ML ANGIO MEDRAD

## (undated) DEVICE — AIRLIFE™ ADULT CUSHION NASAL CANNULA 14 FOOT (4.3) CRUSH-RESISTANT OXYGEN TUBING, AND U/CONNECT-IT ADAPTER: Brand: AIRLIFE™

## (undated) DEVICE — KIT CATH OD16FR 5ML BLLN SIL URIN INDWL STR TIP INF CTRL

## (undated) DEVICE — SLIM BODY SKIN STAPLER: Brand: APPOSE ULC

## (undated) DEVICE — SUTURE NONABSORBABLE MONOFILAMENT 5-0 C-1 1X24 IN PROLENE 8725H

## (undated) DEVICE — FOGARTY ARTERIAL EMBOLECTOMY CATHETER 3F 40CM: Brand: FOGARTY

## (undated) DEVICE — VESSEL LOOPS,MINI, BLUE: Brand: DEVON

## (undated) DEVICE — PLUS HANDPIECE WITH SPRAY TIP: Brand: SURGILAV

## (undated) DEVICE — DRAPE,U/ SHT,SPLIT,PLAS,STERIL: Brand: MEDLINE

## (undated) DEVICE — TOWEL SURG W16XL26IN BLU NONFENESTRATED DLX ST 2 PER PK

## (undated) DEVICE — PREP SKN CHLRAPRP 26ML TNT -- CONVERT TO ITEM 373320

## (undated) DEVICE — SUTURE VCRL SZ 0 L54IN ABSRB VLT W/O NDL POLYGLACTIN 910 J616H

## (undated) DEVICE — CATHETER URETH 16FR BLLN 5CC STD LTX 2 W F TWO OPP DRNGE

## (undated) DEVICE — FLEXOR, CHECK-FLO, INTRODUCER BALKIN UP & OVER, CONTRALATERAL DESIGN: Brand: FLEXOR

## (undated) DEVICE — 3M™ UNIVERSAL ELECTROSURGICAL PLATE, SPLIT, UNCORDED 9160: Brand: 3M™

## (undated) DEVICE — X-RAY SPONGES,12 PLY: Brand: DERMACEA

## (undated) DEVICE — TOWEL: OR BLU 80/CS: Brand: MEDICAL ACTION INDUSTRIES

## (undated) DEVICE — LOOP VES MAXI RED

## (undated) DEVICE — SUT SLK 4-0 18IN TIE MP BLK --

## (undated) DEVICE — INTRODUCER SHTH 6FR CANN L11CM DIL TIP 35MM GRN TUNGSTEN

## (undated) DEVICE — SUTURE MCRYL SZ 4-0 L18IN ABSRB UD L19MM PS-2 3/8 CIR PRIM Y496G

## (undated) DEVICE — TAPE UMB 1/8X30IN MP COT WHT --

## (undated) DEVICE — DRAPE,REIN 53X77,STERILE: Brand: MEDLINE

## (undated) DEVICE — SUTURE ETHLN SZ 2-0 L18IN NONABSORBABLE BLK L26MM PS 3/8 585H

## (undated) DEVICE — 3M™ IOBAN™ 2 ANTIMICROBIAL INCISE DRAPE 6650EZ: Brand: IOBAN™ 2

## (undated) DEVICE — BANDAGE,GAUZE,BULKEE II,4.5"X4.1YD,STRL: Brand: MEDLINE

## (undated) DEVICE — SHEET, DRAPE, SPLIT, STERILE: Brand: MEDLINE

## (undated) DEVICE — FABRIC REINFORCED, SURGICAL GOWN, XL: Brand: CONVERTORS

## (undated) DEVICE — DRSG VAC ASST CLSR GRNUFM MED -- 18X12.5X3.2CM

## (undated) DEVICE — FOGARTY - HYDRAGRIP SURGICAL - CLAMP INSERTS: Brand: FOGARTY SOFTJAW

## (undated) DEVICE — COPE MANDRIL WIRE GUIDE NITINOL: Brand: COPE

## (undated) DEVICE — Z DISCONTINUED USE 2219801 STAPLER SKIN REG CRWN L5.7MM LEG L3.9MM WIRE DIA0.53MM PROX

## (undated) DEVICE — KENDALL SCD EXPRESS SLEEVES, KNEE LENGTH, MEDIUM: Brand: KENDALL SCD

## (undated) DEVICE — INTENDED TO BE USED TO OCCLUDE, RETRACT AND IDENTIFY ARTERIES, VEINS, TENDONS AND NERVES IN SURGICAL PROCEDURES: Brand: STERION®  VESSEL LOOP

## (undated) DEVICE — SYR 5ML 1/5 GRAD LL NSAF LF --

## (undated) DEVICE — KIT PROC VASC MAJ CUST LTX STR --

## (undated) DEVICE — GLOVE SURG SZ 7.5 L11.73IN FNGR THK7.5MIL STRW LTX POLYMER

## (undated) DEVICE — ABDOMINAL PAD: Brand: DERMACEA

## (undated) DEVICE — CATHETER GUID L65CM MRK BND SPC 50MM 0.035IN CROSSCOAT

## (undated) DEVICE — 3M™ TEGADERM™ TRANSPARENT FILM DRESSING FRAME STYLE, 1626, 4 IN X 4-3/4 IN (10 CM X 12 CM), 50/CT 4CT/CASE: Brand: 3M™ TEGADERM™

## (undated) DEVICE — FLOSEAL MATRIX IS INDICATED IN SURGICAL PROCEDURES (OTHER THAN IN OPHTHALMIC) AS AN ADJUNCT TO HEMOSTASIS WHEN CONTROL OF BLEEDING BY LIGATURE OR CONVENTIONALPROCEDURES IS INEFFECTIVE OR IMPRACTICAL.: Brand: FLOSEAL HEMOSTATIC MATRIX

## (undated) DEVICE — SWAB CULT LIQ STUART AGR AERB MOD IN BRK SGL RAYON TIP PLAS 220099] BECTON DICKINSON MICRO]